# Patient Record
Sex: FEMALE | Race: WHITE | NOT HISPANIC OR LATINO | Employment: FULL TIME | URBAN - METROPOLITAN AREA
[De-identification: names, ages, dates, MRNs, and addresses within clinical notes are randomized per-mention and may not be internally consistent; named-entity substitution may affect disease eponyms.]

---

## 2019-04-24 ENCOUNTER — HOSPITAL ENCOUNTER (OUTPATIENT)
Dept: LAB | Facility: HOSPITAL | Age: 54
Discharge: HOME | End: 2019-04-24
Attending: INTERNAL MEDICINE

## 2019-04-24 LAB
T4 FREE SERPL-MCNC: 1.4 NG/DL (ref 0.9–1.8)
TSH SERPL-ACNC: 3.44 M[IU]/L (ref 0.27–4.2)

## 2020-08-05 ENCOUNTER — OFFICE VISIT CONVERTED (OUTPATIENT)
Dept: GASTROENTEROLOGY | Facility: CLINIC | Age: 55
End: 2020-08-05
Attending: NURSE PRACTITIONER

## 2020-09-03 ENCOUNTER — OFFICE VISIT CONVERTED (OUTPATIENT)
Dept: UROLOGY | Facility: CLINIC | Age: 55
End: 2020-09-03
Attending: NURSE PRACTITIONER

## 2020-09-03 ENCOUNTER — HOSPITAL ENCOUNTER (OUTPATIENT)
Dept: SURGERY | Facility: CLINIC | Age: 55
Discharge: HOME | End: 2020-09-03
Attending: NURSE PRACTITIONER

## 2020-09-03 ENCOUNTER — CONVERSION ENCOUNTER (OUTPATIENT)
Dept: SURGERY | Facility: CLINIC | Age: 55
End: 2020-09-03

## 2020-09-05 LAB — BACTERIA UR CULT: NORMAL

## 2020-10-12 ENCOUNTER — HOSPITAL ENCOUNTER (OUTPATIENT)
Dept: PREADMISSION TESTING | Facility: HOSPITAL | Age: 55
Discharge: HOME | End: 2020-10-12
Attending: INTERNAL MEDICINE

## 2020-10-13 LAB — SARS-COV-2 RNA SPEC QL NAA+PROBE: NOT DETECTED

## 2020-10-16 ENCOUNTER — HOSPITAL ENCOUNTER (OUTPATIENT)
Dept: GASTROENTEROLOGY | Facility: HOSPITAL | Age: 55
Setting detail: HOSPITAL OUTPATIENT SURGERY
Discharge: HOME | End: 2020-10-16
Attending: INTERNAL MEDICINE

## 2020-11-30 ENCOUNTER — HOSPITAL ENCOUNTER (OUTPATIENT)
Dept: NUCLEAR MEDICINE | Facility: HOSPITAL | Age: 55
Discharge: HOME | End: 2020-11-30
Attending: FAMILY MEDICINE

## 2021-02-19 ENCOUNTER — HOSPITAL ENCOUNTER (OUTPATIENT)
Dept: LAB | Facility: HOSPITAL | Age: 56
Discharge: HOME | End: 2021-02-19
Attending: FAMILY MEDICINE

## 2021-02-20 LAB
ALBUMIN SERPL-MCNC: 4.2 G/DL (ref 3.5–5)
ALBUMIN/GLOB SERPL: 1.7 {RATIO} (ref 1.4–2.6)
ALP SERPL-CCNC: 95 U/L (ref 53–141)
ALT SERPL-CCNC: 22 U/L (ref 10–40)
ANION GAP SERPL CALC-SCNC: 15 MMOL/L (ref 8–19)
AST SERPL-CCNC: 25 U/L (ref 15–50)
BILIRUB SERPL-MCNC: 0.23 MG/DL (ref 0.2–1.3)
BUN SERPL-MCNC: 20 MG/DL (ref 5–25)
BUN/CREAT SERPL: 19 {RATIO} (ref 6–20)
CALCIUM SERPL-MCNC: 8.7 MG/DL (ref 8.7–10.4)
CHLORIDE SERPL-SCNC: 102 MMOL/L (ref 99–111)
CHOLEST SERPL-MCNC: 221 MG/DL (ref 107–200)
CHOLEST/HDLC SERPL: 3.1 {RATIO} (ref 3–6)
CONV CO2: 23 MMOL/L (ref 22–32)
CONV TOTAL PROTEIN: 6.7 G/DL (ref 6.3–8.2)
CREAT UR-MCNC: 1.08 MG/DL (ref 0.5–0.9)
GFR SERPLBLD BASED ON 1.73 SQ M-ARVRAT: 57 ML/MIN/{1.73_M2}
GLOBULIN UR ELPH-MCNC: 2.5 G/DL (ref 2–3.5)
GLUCOSE SERPL-MCNC: 102 MG/DL (ref 65–99)
HDLC SERPL-MCNC: 71 MG/DL (ref 40–60)
LDLC SERPL CALC-MCNC: 118 MG/DL (ref 70–100)
OSMOLALITY SERPL CALC.SUM OF ELEC: 285 MOSM/KG (ref 273–304)
POTASSIUM SERPL-SCNC: 4.3 MMOL/L (ref 3.5–5.3)
SODIUM SERPL-SCNC: 136 MMOL/L (ref 135–147)
TRIGL SERPL-MCNC: 161 MG/DL (ref 40–150)
VLDLC SERPL-MCNC: 32 MG/DL (ref 5–37)

## 2021-05-10 NOTE — H&P
"   History and Physical      Patient Name: Gualberto Gloria   Patient ID: [id number]   Sex: Female   YOB: 1965    Primary Care Provider: Alfonzo Delacruz MD   Referring Provider: Alfonzo Delacruz MD    Visit Date: August 5, 2020    Provider: ANA LAURA Flowers   Location: [facility]   Location Address: [address] [place], [place] [address], [place]  [id number]   Location Phone: [phone]          Chief Complaint  · \"Abdominal pain\"  · Johnson's Esophagus      History Of Present Illness  The patient is a 55 year old /White female, who presents on referral from Alfonzo Delacruz MD, for a gastroenterology evaluation for abdominal pain.          The patient went to the ED at [facility] on 6/28/2020 for abdominal pain.  CT at that time revealed mild right-sided colitis.  She was diagnosed with a UTI and colitis at that visit and was given a prescription of Flagyl and Cipro.  She went back to the ER on 07/12/2020 for abdominal pain.  CT abdomen/pelvis at that ER visit was unremarkable.  She was positive for C. difficile at her second ED visit.  She was given a prescription of vancomycin.  She states that her abdominal pain has gotten a lot better now since taking the vancomycin, but she still has some abdominal pain.  Her abdominal pain is intermittent and is located anywhere between her umbilical and suprapubic region.  She describes the pain as \"rolling.\"  She states that she had blood in her stool that she has seen within the last couple weeks occurring intermittently with wiping.  She states that she feels it takes longer to get her bowel movement.  She has no family history of colon cancer.  Her uncle had stomach cancer.  The patient has a past medical history of Johnson's esophagus.  She states that her reflux is well controlled with omeprazole.    Labs 07/12/2020: C. difficile positive, hemoglobin 13.9, hematocrit 41.9, platelets 295, ALT 19, AST 16, total bili 0.4, alk " phos 83    CT abdomen/pelvis with contrast on 07/12/2020 revealed no acute process identified within the abdomen/pelvis.    CT abdomen/pelvis with contrast on 06/28/2020 revealed mild right-sided colitis.    EGD by Dr. Bueno on 03/2017 revealed small hiatal hernia, salmon-colored mucosa distributed in a patchy pattern in the esophagus found, normal stomach, and normal duodenum.  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    Colonoscopy on 03/2014 by Dr. Bueno revealed normal colon.  Random colon biopsy revealed no significant histopathology.       Past Medical History  Johnson's esophagus; High blood pressure; Hyperthyroidism         Past Surgical History  Colonoscopy; EGD; Tubal ligation         Medication List  Aspir-81 81 mg oral tablet,delayed release (DR/EC); levothyroxine 25 mcg oral tablet; lisinopril-hydrochlorothiazide 20-12.5 mg oral tablet; omeprazole 40 mg oral capsule,delayed release(DR/EC); paroxetine HCl 20 mg oral tablet; simvastatin 20 mg oral tablet         Allergy List  NO KNOWN DRUG ALLERGIES       Allergies Reconciled  Family Medical History  - No Family History of Colorectal Cancer; Family history of stomach cancer         Social History  Alcohol (Current some day); Tobacco (Current every day)         Review of Systems  · Constitutional  o Denies  o : chills, fever  · Eyes  o Denies  o : blurred vision, changes in vision  · Cardiovascular  o Denies  o : chest pain  · Respiratory  o Denies  o : shortness of breath  · Gastrointestinal  o Denies  o : nausea, vomiting  · Genitourinary  o Denies  o : dysuria, blood in urine  · Integument  o Denies  o : rash  · Neurologic  o Denies  o : tingling or numbness  · Musculoskeletal  o Denies  o : joint pain  · Endocrine  o Denies  o : weight gain, weight loss  · Psychiatric  o Denies  o : anxiety, depression      Vitals  Date Time BP  "Position Site L\R Cuff Size HR RR TEMP (F) WT  HT  BMI kg/m2 BSA m2 O2 Sat        08/05/2020 09:40 /75 Sitting    77 - R 16  203lbs 16oz 5'  4\" 35.02 2.04 99 %          Physical Examination  · Constitutional  o Appearance  o : obese, well developed  · Eyes  o Vision  o :   § Visual Fields  § : move symmetrical in all directions  o Sclerae  o : sclerae anicteric  o Pupils and Irises  o : pupils equal and symetrical  · Neck  o Inspection/Palpation  o : Trachea is midline, no adenopathy  o Thyroid  o : Thyroid is not enlarged  · Respiratory  o Respiratory Effort  o : Breathing is unlabored.  o Inspection of Chest  o : normal appearance, no retractions  o Auscultation of Lungs  o : Chest is clear to auscultation bilaterally  · Cardiovascular  o Heart  o :   § Auscultation of Heart  § : no murmurs, rubs, or gallops, RRR  · Gastrointestinal  o Abdominal Examination  o : Abdomen is soft, nontender to palpation, with normal active bowel sounds, no appreciable hepatosplenomegaly.  · Genitourinary  o Digital Rectal Examination  o : Deferred  · Skin and Subcutaneous Tissue  o General Inspection  o : Skin is without focal lesions. Skin turgor is normal.  · Psychiatric  o Mood and Affect  o : Mood and affect are appropriate to circumstances.              Assessment  · Abdominal pain, generalized     789.07/R10.84  · Altered Bowel Habits     787.99/R19.4  · Johnson's esophagus     530.85  · Rectal bleeding     569.3/K62.5  · GERD (gastroesophageal reflux disease)     530.81/K21.9      Plan  · Orders  o Consent for Esophagogastrodudodenoscopy (EGD) with dilatation -Possible risk/complications, benefits, and alternatives to surgical or invasive procedure have been explained to patient and/or legal guardian. -Patient has been evaluated and can tolerate anesthesia and/or sedation. Risk, benefits, and alternatives to anesthesia and sedation have been explained to patient and/or legal guardian. ([id number]) - 530.85, 530.81/K21.9 - " 08/05/2020  o Flexible Colonoscopy -Possible risks/complications, benefits, and alternatives to surgical or invasive procedure have been explained to patient and/or legal gaurdian. -Patient has been evaluated and can tolerate anethesia and/or sedation. Risk, benefits, and alternatives to anethesia and/or sedation have been explained to patient and/or legal gaurdian. ([id number]) - 569.3/K62.5, 789.07/R10.84, 787.99/R19.4 - 08/05/2020  · Instructions  o 55-year-old female, with a history of Johnson's esophagus, presents today after having 2 ED visits at [facility] for abdominal pain. On her second ER visit, she was positive for C. difficile. She states that her abdominal pain has gotten better since taking vancomycin, but she still has occasional pain. She reports she has seen blood in her stool intermittently within the past few weeks with wiping. I have recommended staying on omeprazole as it is controlling her reflux and she has a history of Johnson's esophagus. I have recommended undergoing an EGD/colonoscopy for her Johnson's esophagus surveillance and for her lower GI symptoms. I have instructed to the patient that COVID testing will be required prior to procedure. Patient is agreeable to plan.   o Electronically Identified Patient Education Materials Provided Electronically            Electronically Signed by: ANA LAURA Flowers -Author on August 5, 2020 10:04:37 AM  Electronically Co-signed by: Lance Bueno MD -Reviewer on August 5, 2020 10:37:13 AM

## 2021-05-10 NOTE — H&P
"   History and Physical      Patient Name: Gualberto Gloria   Patient ID: [id number]   Sex: Female   YOB: 1965    Primary Care Provider: Alfonzo Dealcruz MD   Referring Provider: Alfonzo Delacruz MD    Visit Date: September 3, 2020    Provider: ANA LAURA Smith   Location: [facility]   Location Address: [address] [place] [address]  [id number]   Location Phone: [phone]          Chief Complaint  · pt here for urologic concerns      History Of Present Illness  The patient is a 55 year old /White female, who is a consultation from Alfonzo Delacruz MD, for a persistent UTI.          The patient states that this began June 28th 2020 when she began experiencing severe right lower quadrant abdominal pain and presented to the emergency department for that complaint.    The patient reports that she gave a urine specimen while in the emergency department and was diagnosed with a urinary tract infection.  She was treated with ciprofloxacin 500 mg twice daily x7 days and states that she had diarrhea the entire time that she was on this medication.  Patient was also found to have a possible small right sided urethral diverticulum on CT scan at this visit.    The patient presented again to the emergency department on July 12, 2024 abdominal complaints with diarrhea.  The patient reports that she was diagnosed with a urinary tract infection at that point in time as she was placed on antibiotic although, a review of the patient's records does not support this.  The patient was actually diagnosed with C. difficile and treated with vancomycin oral tablets, Zofran and, Bentyl.     She states that Dr. Delacruz referred her to urology as he believed she has a prolapsed bladder.    The patient reports having urgency frequency and bladder fullness.  She reports this as a \"achy hollow feeling\" as well as feeling bloated at the same time.    Urine " cultures:    6/28/2020: Multiple gram-positive organisms not suggestive of urinary tract  7/12/2020:  no uropathogens  8/12/2020: To bacilli 50,000 colony-forming units per mL             Past Medical History  Johnson's esophagus; High blood pressure; Hyperthyroidism         Past Surgical History  Colonoscopy; EGD; Tubal ligation         Medication List  Aspir-81 81 mg oral tablet,delayed release (DR/EC); levothyroxine 25 mcg oral tablet; lisinopril-hydrochlorothiazide 20-12.5 mg oral tablet; omeprazole 40 mg oral capsule,delayed release(DR/EC); paroxetine HCl 20 mg oral tablet; simvastatin 20 mg oral tablet; Suprep Bowel Prep Kit 17.5-3.13-1.6 gram oral recon soln; Vitamin D3 oral         Allergy List  Codeine Phosphate       Allergies Reconciled  Family Medical History  - No Family History of Colorectal Cancer; Family history of stomach cancer         Social History  Alcohol (Current some day); Tobacco (Former)         Review of Systems  · Constitutional  o Denies  o : fever, chills  · Cardiovascular  o Denies  o : chest pain at rest, chest pain with exercise, irregular heart beats, palpitations, leg cramps with exercise  · Respiratory  o Denies  o : shortness of breath, wheezing, sleep apnea  · Gastrointestinal  o Denies  o : heartburn or indigestion, nausea or vomiting, change in abdominal girth, diarrhea, constipation, blood in stools  · Genitourinary  o Admits  o : additional symptoms as noted in HPI  · Integument  o Denies  o : rash, new skin lesions  · Neurologic  o Denies  o : memory difficulties, headache, mini-strokes, seizures  · Endocrine  o Denies  o : hot flashes, thyroid disorders  · Heme-Lymph  o Denies  o : easy bleeding, easy bruising, sickle cell disease or trait, lymph node enlargement or tenderness  · Allergic-Immunologic  o Denies  o : immune deficiency, HIV, Hepatitis C      Vitals  Date Time BP Position Site L\R Cuff Size HR RR TEMP (F) WT  HT  BMI kg/m2 BSA m2 O2 Sat HC       09/03/2020  "10:24 AM       12  203lbs 16oz 5'  4\" 35.02 2.04           Physical Examination  · Constitutional  o Appearance  o : Well nourished, well developed patient in no acute distress. Ambulating without difficulty.  · Head and Face  o Head  o :   § Inspection  § : atraumatic, normocephalic  o Face  o :   § Inspection  § : no facial lesions  · Eyes  o Sclerae  o : sclerae white  · Ears, Nose, Mouth and Throat  o Ears  o :   § External Ears  § : appearance within normal limits, no lesions present  o Nose  o :   § External Nose  § : appearance normal  · Neck  o Inspection/Palpation  o : normal appearance, trachea midline  o Thyroid  o : Normal size without tenderness, nodules or masses  · Respiratory  o Respiratory Effort  o : Breathing is unlabored without accessory muscle use  o Inspection of Chest  o : normal appearance, no retractions  · Genitourinary  o External Genitalia  o : no inflammation, no lesions present  o Vagina  o : atrophic changes present, white-colored discharge present, no inflammatory lesions present, no masses present, urethral mobility 0 degrees  o Anus  o : no inflammation or lesions present  o Perineum  o : perineum within normal limits  · Musculoskeletal  o Pelvis  o : no pelvic bony or muscular tenderness  o Ribs  o : no deformities or tenderness to palpation present, costochondral junctions nontender to palpation  · Skin and Subcutaneous Tissue  o General Inspection  o : No rashes, lesions or areas of discoloration present. Skin turgor is normal.  · Neurologic  o Mental Status Examination  o :   § Orientation  § : grossly oriented to person, place and time  § Speech/Language  § : communication ability within normal limits  o Gait and Station  o : normal gait, able to stand without difficulty  · Psychiatric  o Judgement and Insight  o : judgment and insight intact, judgement for everyday activities and social situations within normal limits, insight intact  o Mood and Affect  o : mood normal, " affect appropriate          Results  · In-Office Procedures  o Lab procedure  § Automated dipstick urinalysis with microscopy ([id number])   § Color Ur: Yellow   § Clarity Ur: Clear   § Glucose Ur Ql Strip: Negative   § Bilirub Ur Ql Strip: Negative   § Ketones Ur Ql Strip: Negative   § Sp Gr Ur Qn: 1.020   § Hgb Ur Ql Strip: Negative   § pH Ur-LsCnc: 5.0   § Prot Ur Ql Strip: Negative   § Urobilinogen Ur Strip-mCnc: 0.2   § Nitrite Ur Ql Strip: Negative   § WBC Est Ur Ql Strip: Negative   § RBC UrnS Qn HPF: 0   § WBC UrnS Qn HPF: 0   § Bacteria UrnS Qn HPF: tntc   § Crystals UrnS Qn HPF: 0   § Epithelial Cells (non renal): 0 /HPF  § Epithelial Cells (renal): 0   o Surgical procedure  § IOP - Bladder Scan/Residual Urine ([id number])   § Specimen vol Ur: 44       Assessment  · Urinary frequency     788.41/R35.0  · Urinary urgency     788.63/R39.15  · Urethral diverticulum     599.2/N36.1    Problems Reconciled  Plan  · Orders  o Urine Culture (Clean Catch) [facility] (23240) - 788.41/R35.0 - 09/03/2020  · Medications  o oxybutynin chloride 5 mg oral tablet extended release 24hr   SIG: take 1 tablet (5 mg) by oral route once daily for 30 days   DISP: (30) tablets with 11 refills  Prescribed on 09/03/2020     o Medications have been Reconciled  o Transition of Care or Provider Policy  · Instructions  o DISCUSSION: discussed that she does not appear to be having urinary tract infections and that he does not appear to have a prolapse of her bladder. Discussed with the patient that her symptoms sound consistent with overactive bladder and that we can order medication to treat those symptoms. Educated the patient that this medication may cause dry mouth and dry eyes and can cause urinary retention as well. Patient is agreeable to trial this medication will call the office if she is having any issues.  o PLAN: Initiate oxybutynin 5 mg daily, patient educated to avoid bladder irritants, we will follow-up with patient in office in 6  weeks.  o Urine culture  o Electronically Identified Patient Education Materials Provided Electronically            Electronically Signed by: ANA LAURA Smith -Author on September 3, 2020 01:15:02 PM

## 2021-05-14 VITALS — RESPIRATION RATE: 12 BRPM | BODY MASS INDEX: 34.83 KG/M2 | HEIGHT: 64 IN | WEIGHT: 204 LBS

## 2021-05-15 VITALS
RESPIRATION RATE: 16 BRPM | HEART RATE: 77 BPM | BODY MASS INDEX: 34.83 KG/M2 | WEIGHT: 204 LBS | SYSTOLIC BLOOD PRESSURE: 107 MMHG | OXYGEN SATURATION: 99 % | HEIGHT: 64 IN | DIASTOLIC BLOOD PRESSURE: 75 MMHG

## 2021-12-02 ENCOUNTER — OFFICE VISIT (OUTPATIENT)
Dept: INTERNAL MEDICINE | Facility: CLINIC | Age: 56
End: 2021-12-02

## 2021-12-02 VITALS
HEART RATE: 74 BPM | TEMPERATURE: 98.1 F | DIASTOLIC BLOOD PRESSURE: 84 MMHG | WEIGHT: 206.6 LBS | BODY MASS INDEX: 35.27 KG/M2 | SYSTOLIC BLOOD PRESSURE: 118 MMHG | OXYGEN SATURATION: 94 % | HEIGHT: 64 IN

## 2021-12-02 DIAGNOSIS — B00.9 HERPES SIMPLEX: ICD-10-CM

## 2021-12-02 DIAGNOSIS — F17.211 CIGARETTE NICOTINE DEPENDENCE IN REMISSION: ICD-10-CM

## 2021-12-02 DIAGNOSIS — J44.1 COPD WITH EXACERBATION (HCC): ICD-10-CM

## 2021-12-02 DIAGNOSIS — Z76.89 ESTABLISHING CARE WITH NEW DOCTOR, ENCOUNTER FOR: Primary | ICD-10-CM

## 2021-12-02 DIAGNOSIS — F32.89 OTHER DEPRESSION: ICD-10-CM

## 2021-12-02 DIAGNOSIS — I10 ESSENTIAL HYPERTENSION: ICD-10-CM

## 2021-12-02 DIAGNOSIS — R07.89 OTHER CHEST PAIN: ICD-10-CM

## 2021-12-02 DIAGNOSIS — E03.8 OTHER SPECIFIED HYPOTHYROIDISM: ICD-10-CM

## 2021-12-02 DIAGNOSIS — Z13.0 SCREENING FOR DEFICIENCY ANEMIA: ICD-10-CM

## 2021-12-02 DIAGNOSIS — Z78.0 POSTMENOPAUSAL: ICD-10-CM

## 2021-12-02 DIAGNOSIS — E78.2 MIXED HYPERLIPIDEMIA: ICD-10-CM

## 2021-12-02 DIAGNOSIS — Z12.31 ENCOUNTER FOR SCREENING MAMMOGRAM FOR MALIGNANT NEOPLASM OF BREAST: ICD-10-CM

## 2021-12-02 DIAGNOSIS — N18.31 STAGE 3A CHRONIC KIDNEY DISEASE (HCC): ICD-10-CM

## 2021-12-02 DIAGNOSIS — R73.9 BLOOD GLUCOSE ELEVATED: ICD-10-CM

## 2021-12-02 LAB
EXPIRATION DATE: NORMAL
FLUAV AG UPPER RESP QL IA.RAPID: NOT DETECTED
FLUBV AG UPPER RESP QL IA.RAPID: NOT DETECTED
INTERNAL CONTROL: NORMAL
Lab: NORMAL
SARS-COV-2 AG UPPER RESP QL IA.RAPID: NOT DETECTED

## 2021-12-02 PROCEDURE — 84443 ASSAY THYROID STIM HORMONE: CPT | Performed by: STUDENT IN AN ORGANIZED HEALTH CARE EDUCATION/TRAINING PROGRAM

## 2021-12-02 PROCEDURE — 87428 SARSCOV & INF VIR A&B AG IA: CPT | Performed by: STUDENT IN AN ORGANIZED HEALTH CARE EDUCATION/TRAINING PROGRAM

## 2021-12-02 PROCEDURE — 80061 LIPID PANEL: CPT | Performed by: STUDENT IN AN ORGANIZED HEALTH CARE EDUCATION/TRAINING PROGRAM

## 2021-12-02 PROCEDURE — 99204 OFFICE O/P NEW MOD 45 MIN: CPT | Performed by: STUDENT IN AN ORGANIZED HEALTH CARE EDUCATION/TRAINING PROGRAM

## 2021-12-02 PROCEDURE — 85025 COMPLETE CBC W/AUTO DIFF WBC: CPT | Performed by: STUDENT IN AN ORGANIZED HEALTH CARE EDUCATION/TRAINING PROGRAM

## 2021-12-02 PROCEDURE — 83036 HEMOGLOBIN GLYCOSYLATED A1C: CPT | Performed by: STUDENT IN AN ORGANIZED HEALTH CARE EDUCATION/TRAINING PROGRAM

## 2021-12-02 PROCEDURE — 80053 COMPREHEN METABOLIC PANEL: CPT | Performed by: STUDENT IN AN ORGANIZED HEALTH CARE EDUCATION/TRAINING PROGRAM

## 2021-12-02 PROCEDURE — 84439 ASSAY OF FREE THYROXINE: CPT | Performed by: STUDENT IN AN ORGANIZED HEALTH CARE EDUCATION/TRAINING PROGRAM

## 2021-12-02 PROCEDURE — 87635 SARS-COV-2 COVID-19 AMP PRB: CPT | Performed by: STUDENT IN AN ORGANIZED HEALTH CARE EDUCATION/TRAINING PROGRAM

## 2021-12-02 RX ORDER — ASPIRIN 81 MG/1
81 TABLET ORAL EVERY OTHER DAY
COMMUNITY
End: 2021-12-02 | Stop reason: SDUPTHER

## 2021-12-02 RX ORDER — SIMVASTATIN 20 MG
20 TABLET ORAL DAILY
Qty: 90 TABLET | Refills: 3 | Status: SHIPPED | OUTPATIENT
Start: 2021-12-02 | End: 2021-12-03

## 2021-12-02 RX ORDER — ACYCLOVIR 400 MG/1
400 TABLET ORAL 3 TIMES DAILY
Qty: 21 TABLET | Refills: 0 | Status: SHIPPED | OUTPATIENT
Start: 2021-12-02 | End: 2021-12-09

## 2021-12-02 RX ORDER — LEVOTHYROXINE SODIUM 0.03 MG/1
25 TABLET ORAL DAILY
Qty: 90 TABLET | Refills: 2 | Status: SHIPPED | OUTPATIENT
Start: 2021-12-02 | End: 2022-11-16 | Stop reason: SDUPTHER

## 2021-12-02 RX ORDER — LISINOPRIL AND HYDROCHLOROTHIAZIDE 20; 12.5 MG/1; MG/1
1 TABLET ORAL DAILY
COMMUNITY
End: 2021-12-02 | Stop reason: SDUPTHER

## 2021-12-02 RX ORDER — CHOLECALCIFEROL (VITAMIN D3) 125 MCG
CAPSULE ORAL EVERY OTHER DAY
COMMUNITY
End: 2021-12-02 | Stop reason: SDUPTHER

## 2021-12-02 RX ORDER — LEVOTHYROXINE SODIUM 0.03 MG/1
25 TABLET ORAL DAILY
COMMUNITY
End: 2021-12-02 | Stop reason: SDUPTHER

## 2021-12-02 RX ORDER — PREDNISONE 20 MG/1
40 TABLET ORAL DAILY
Qty: 10 TABLET | Refills: 0 | Status: SHIPPED | OUTPATIENT
Start: 2021-12-02 | End: 2021-12-07

## 2021-12-02 RX ORDER — PAROXETINE HYDROCHLORIDE 40 MG/1
40 TABLET, FILM COATED ORAL EVERY MORNING
Qty: 90 TABLET | Refills: 3 | Status: SHIPPED | OUTPATIENT
Start: 2021-12-02 | End: 2022-11-16 | Stop reason: SDUPTHER

## 2021-12-02 RX ORDER — PAROXETINE HYDROCHLORIDE 40 MG/1
40 TABLET, FILM COATED ORAL EVERY MORNING
COMMUNITY
End: 2021-12-02 | Stop reason: SDUPTHER

## 2021-12-02 RX ORDER — OMEPRAZOLE 40 MG/1
40 CAPSULE, DELAYED RELEASE ORAL DAILY
COMMUNITY
End: 2021-12-02 | Stop reason: SDUPTHER

## 2021-12-02 RX ORDER — SIMVASTATIN 20 MG
20 TABLET ORAL DAILY
COMMUNITY
End: 2021-12-02 | Stop reason: SDUPTHER

## 2021-12-02 RX ORDER — OMEPRAZOLE 40 MG/1
40 CAPSULE, DELAYED RELEASE ORAL DAILY
Qty: 90 CAPSULE | Refills: 3 | Status: SHIPPED | OUTPATIENT
Start: 2021-12-02 | End: 2022-11-16 | Stop reason: SDUPTHER

## 2021-12-02 RX ORDER — LISINOPRIL AND HYDROCHLOROTHIAZIDE 20; 12.5 MG/1; MG/1
1 TABLET ORAL DAILY
Qty: 90 TABLET | Refills: 3 | Status: SHIPPED | OUTPATIENT
Start: 2021-12-02 | End: 2022-11-16 | Stop reason: SDUPTHER

## 2021-12-02 RX ORDER — CHOLECALCIFEROL (VITAMIN D3) 125 MCG
2000 CAPSULE ORAL EVERY OTHER DAY
Qty: 30 TABLET | Refills: 2 | Status: SHIPPED | OUTPATIENT
Start: 2021-12-02

## 2021-12-02 RX ORDER — ASPIRIN 81 MG/1
81 TABLET ORAL EVERY OTHER DAY
Qty: 90 TABLET | Refills: 2 | Status: SHIPPED | OUTPATIENT
Start: 2021-12-02

## 2021-12-02 NOTE — PROGRESS NOTES
Chief Complaint  Establish Care, Med Refill, and Sinus Problem (pissible sinus infection)    Subjective          Gualberto Gloria presents to [facility]  History of Present Illness    Previous PCP: Dr Delacruz  Specialist(s):  none  COVID vaccine: none  Colon cancer screeninyrs ago  Mammogram: none  Pap Smear: 12 years  DEXA/Bone Density: none    Here with  establish care.  Previous PCP was Dr. Delacruz    Past medical history is notable for essential hypertension, hypothyroid, and reportedly COPD.    Here with 3 days of cough productive of yellow sputum.  Also notes that recently started having sores appear on her lips.  These occur about once a year.    She is afebrile.  She quit smoking approximately 4 years ago.  She is on no inhalers.  She does report sinus pressure.    Works as     History of depression, on paroxetine.  She denies SI.    Reports having left sided chest pain  No triggering factors or exacerbating factors  Sitting still helps alleviate  Reports having previous evaluation by cardiology (Dr. Pastrana) for chest pain including a negative stress test    No previous known history of MI, CVA or cancer diagnosis.    PHQ-9 Depression Screening  Little interest or pleasure in doing things? 0   Feeling down, depressed, or hopeless? 0   Trouble falling or staying asleep, or sleeping too much?     Feeling tired or having little energy?     Poor appetite or overeating?     Feeling bad about yourself - or that you are a failure or have let yourself or your family down?     Trouble concentrating on things, such as reading the newspaper or watching television?     Moving or speaking so slowly that other people could have noticed? Or the opposite - being so fidgety or restless that you have been moving around a lot more than usual?     Thoughts that you would be better off dead, or of hurting yourself in some way?     PHQ-9 Total Score 0  "  If you checked off any problems, how difficult have these problems made it for you to do your work, take care of things at home, or get along with other people?             Current Outpatient Medications   Medication Instructions   • acyclovir (ZOVIRAX) 400 mg, Oral, 3 Times Daily, Take 3 times a day for 5 days.  Then twice a day   • aspirin 81 mg, Oral, Every Other Day   • levothyroxine (SYNTHROID, LEVOTHROID) 25 mcg, Oral, Daily   • lisinopril-hydrochlorothiazide (PRINZIDE,ZESTORETIC) 20-12.5 MG per tablet 1 tablet, Oral, Daily   • omeprazole (PRILOSEC) 40 mg, Oral, Daily   • PARoxetine (PAXIL) 40 mg, Oral, Every Morning   • predniSONE (DELTASONE) 40 mg, Oral, Daily   • simvastatin (ZOCOR) 20 mg, Oral, Daily   • Vitamin D3 50 mcg, Oral, Every Other Day         Objective   Vital Signs:   /84   Pulse 74   Temp 98.1 °F (36.7 °C) (Temporal)   Ht 162.6 cm (64\")   Wt 93.7 kg (206 lb 9.6 oz)   SpO2 94%   BMI 35.46 kg/m²     Wt Readings from Last 3 Encounters:   12/02/21 93.7 kg (206 lb 9.6 oz)   09/03/20 92.5 kg (204 lb)   08/05/20 92.5 kg (204 lb)     BP Readings from Last 3 Encounters:   12/02/21 118/84   08/05/20 107/75     Physical Exam  Constitutional:       Appearance: Normal appearance. She is obese.   HENT:      Head: Normocephalic and atraumatic.      Mouth/Throat:      Mouth: Mucous membranes are moist.      Pharynx: Oropharynx is clear. No oropharyngeal exudate or posterior oropharyngeal erythema.   Eyes:      Conjunctiva/sclera: Conjunctivae normal.   Cardiovascular:      Rate and Rhythm: Normal rate and regular rhythm.      Pulses: Normal pulses.      Heart sounds: Normal heart sounds. No murmur heard.      Pulmonary:      Effort: Pulmonary effort is normal. No respiratory distress.      Breath sounds: Normal breath sounds. No wheezing, rhonchi or rales.   Abdominal:      General: Abdomen is flat.      Palpations: Abdomen is soft.   Skin:     General: Skin is warm and dry.      Comments: " Superficial ulcers noted on lips of face.  No ulcers or sores noted in oropharynx   Neurological:      General: No focal deficit present.      Mental Status: She is alert. Mental status is at baseline.   Psychiatric:         Mood and Affect: Mood normal.         Behavior: Behavior normal.         Thought Content: Thought content normal.         Judgment: Judgment normal.        Result Review :   The following data was reviewed by: Chito Hernandes MD on 12/02/2021:  Common labs    Common Labsle 2/19/21 2/19/21    1356 1356   Glucose 102 (A)    BUN 20    Creatinine 1.08 (A)    Sodium 136    Potassium 4.3    Chloride 102    Calcium 8.7    Albumin 4.2    Total Bilirubin 0.23    Alkaline Phosphatase 95    AST (SGOT) 25    ALT (SGPT) 22    Total Cholesterol  221 (A)   Triglycerides  161 (A)   HDL Cholesterol  71 (A)   LDL Cholesterol   118 (A)   (A) Abnormal value       Comments are available for some flowsheets but are not being displayed.                Lab Results   Component Value Date    SARSANTIGEN Not Detected 12/02/2021    COVID19 NOT DETECTED 10/12/2020    FLUAAG Not Detected 12/02/2021         Procedures        Assessment and Plan    Diagnoses and all orders for this visit:    1. Establishing care with new doctor, encounter for (Primary)    2. Mixed hyperlipidemia  -     simvastatin (ZOCOR) 20 MG tablet; Take 1 tablet by mouth Daily.  Dispense: 90 tablet; Refill: 3  -     Lipid Panel  -     TSH    3. Stage 3a chronic kidney disease (HCC)  -     Comprehensive Metabolic Panel    4. Blood glucose elevated  -     Hemoglobin A1c    5. Encounter for screening mammogram for malignant neoplasm of breast  -     Mammo Screening Digital Tomosynthesis Bilateral With CAD; Future    6. Essential hypertension  -     lisinopril-hydrochlorothiazide (PRINZIDE,ZESTORETIC) 20-12.5 MG per tablet; Take 1 tablet by mouth Daily.  Dispense: 90 tablet; Refill: 3    7. Other specified hypothyroidism  -     levothyroxine (SYNTHROID,  LEVOTHROID) 25 MCG tablet; Take 1 tablet by mouth Daily.  Dispense: 90 tablet; Refill: 2  -     TSH  -     T4, Free    8. Other chest pain  -     POCT SARS-CoV-2 Antigen JAVED  -     COVID-19,CEPHEID/TIEN/BDMAX,COR/GONSALO/PAD/PHIL IN-HOUSE(OR EMERGENT/ADD-ON),NP SWAB IN TRANSPORT MEDIA 3-4 HR TAT, RT-PCR - Swab, Nasopharynx    9. Screening for deficiency anemia  -     CBC & Differential    10. COPD with exacerbation (HCC)  -     predniSONE (DELTASONE) 20 MG tablet; Take 2 tablets by mouth Daily for 5 days.  Dispense: 10 tablet; Refill: 0    11. Herpes simplex  -     acyclovir (Zovirax) 400 MG tablet; Take 1 tablet by mouth 3 (Three) Times a Day for 7 days. Take 3 times a day for 5 days.  Then twice a day  Dispense: 21 tablet; Refill: 0    12. Other depression  -     PARoxetine (PAXIL) 40 MG tablet; Take 1 tablet by mouth Every Morning.  Dispense: 90 tablet; Refill: 3    13. Cigarette nicotine dependence in remission  -      CT Chest Low Dose Cancer Screening WO; Future    14. Postmenopausal  -     DEXA Bone Density Axial    Other orders  -     aspirin 81 MG EC tablet; Take 1 tablet by mouth Every Other Day.  Dispense: 90 tablet; Refill: 2  -     Cholecalciferol (Vitamin D3) 50 MCG (2000 UT) tablet; Take 1 tablet by mouth Every Other Day.  Dispense: 30 tablet; Refill: 2  -     omeprazole (priLOSEC) 40 MG capsule; Take 1 capsule by mouth Daily.  Dispense: 90 capsule; Refill: 3    CP:  -reportedly with negative stress test  -have asked  to request cardiology records    Obesity:  -recommended 2 1/2 hrs a week of moderate exercise  -nutritional counseling today including low sodium diet (<2,500 mg Na daily) as well as Mediterranean diet    Health maintenance:  -declines flu shot today    Medications Discontinued During This Encounter   Medication Reason   • aspirin 81 MG EC tablet Reorder   • Cholecalciferol (Vitamin D3) 50 MCG (2000 UT) tablet Reorder   • levothyroxine (SYNTHROID, LEVOTHROID) 25 MCG tablet Reorder    • omeprazole (priLOSEC) 40 MG capsule Reorder   • simvastatin (ZOCOR) 20 MG tablet Reorder   • lisinopril-hydrochlorothiazide (PRINZIDE,ZESTORETIC) 20-12.5 MG per tablet Reorder   • PARoxetine (PAXIL) 40 MG tablet Reorder          Follow Up   Return in about 3 months (around 3/2/2022) for Hypothyroid.  Patient was given instructions and counseling regarding her condition or for health maintenance advice. Please see specific information pulled into the AVS if appropriate.

## 2021-12-02 NOTE — PATIENT INSTRUCTIONS
Cooking With Less Salt  Cooking with less salt is one way to reduce the amount of sodium you get from food. Sodium is one of the elements that make up salt. It is found naturally in foods and is also added to certain foods. Depending on your condition and overall health, your health care provider or dietitian may recommend that you reduce your sodium intake. Most people should have less than 2,300 milligrams (mg) of sodium each day. If you have high blood pressure (hypertension), you may need to limit your sodium to 1,500 mg each day. Follow the tips below to help reduce your sodium intake.  What are tips for eating less sodium?  Reading food labels       · Check the food label before buying or using packaged ingredients. Always check the label for the serving size and sodium content.  · Look for products with no more than 140 mg of sodium in one serving.  · Check the % Daily Value column to see what percent of the daily recommended amount of sodium is provided in one serving of the product. Foods with 5% or less in this column are considered low in sodium. Foods with 20% or higher are considered high in sodium.  · Do not choose foods with salt as one of the first three ingredients on the ingredients list. If salt is one of the first three ingredients, it usually means the item is high in sodium.     Shopping  1. Buy sodium-free or low-sodium products. Look for the following words on food labels:  ? Low-sodium.  ? Sodium-free.  ? Reduced-sodium.  ? No salt added.  ? Unsalted.  2. Always check the sodium content even if foods are labeled as low-sodium or no salt added.  3. Buy fresh foods.  Cooking  · Use herbs, seasonings without salt, and spices as substitutes for salt.  · Use sodium-free baking soda when baking.  · Zenda, braise, or roast foods to add flavor with less salt.  · Avoid adding salt to pasta, rice, or hot cereals.  · Drain and rinse canned vegetables, beans, and meat before use.  · Avoid adding salt  "when cooking sweets and desserts.  · Cook with low-sodium ingredients.  What foods are high in sodium?  Vegetables  Regular canned vegetables (not low-sodium or reduced-sodium). Sauerkraut, pickled vegetables, and relishes. Olives. French fries. Onion rings. Regular canned tomato sauce and paste. Regular tomato and vegetable juice. Frozen vegetables in sauces.  Grains  Instant hot cereals. Bread stuffing, pancake, and biscuit mixes. Croutons. Seasoned rice or pasta mixes. Noodle soup cups. Boxed or frozen macaroni and cheese. Regular salted crackers. Self-rising flour. Rolls. Bagels. Flour tortillas and wraps.  Meats and other proteins  Meat or fish that is salted, canned, smoked, cured, spiced, or pickled. This includes salas, ham, sausages, hot dogs, corned beef, chipped beef, meat loaves, salt pork, jerky, pickled herring, anchovies, regular canned tuna, and sardines. Salted nuts.  Dairy  Processed cheese and cheese spreads. Cheese curds. Blue cheese. Feta cheese. String cheese. Regular cottage cheese. Buttermilk. Canned milk.  The items listed above may not be a complete list of foods high in sodium. Actual amounts of sodium may be different depending on processing. Contact a dietitian for more information.  What foods are low in sodium?  Fruits  Fresh, frozen, or canned fruit with no sauce added. Fruit juice.  Vegetables  Fresh or frozen vegetables with no sauce added. \"No salt added\" canned vegetables. \"No salt added\" tomato sauce and paste. Low-sodium or reduced-sodium tomato and vegetable juice.  Grains  Noodles, pasta, quinoa, rice. Shredded or puffed wheat or puffed rice. Regular or quick oats (not instant). Low-sodium crackers. Low-sodium bread. Whole-grain bread and whole-grain pasta. Unsalted popcorn.  Meats and other proteins  Fresh or frozen whole meats, poultry (not injected with sodium), and fish with no sauce added. Unsalted nuts. Dried peas, beans, and lentils without added salt. Unsalted canned " beans. Eggs. Unsalted nut butters. Low-sodium canned tuna or chicken.  Dairy  Milk. Soy milk. Yogurt. Low-sodium cheeses, such as Swiss, [place], mozzarella, and ricotta. Sherbet or ice cream (keep to ½ cup per serving). Cream cheese.  Fats and oils  Unsalted butter or margarine.  Other foods  Homemade pudding. Sodium-free baking soda and baking powder. Herbs and spices. Low-sodium seasoning mixes.  Beverages  Coffee and tea. Carbonated beverages.  The items listed above may not be a complete list of foods low in sodium. Actual amounts of sodium may be different depending on processing. Contact a dietitian for more information.  What are some salt alternatives when cooking?  The following are herbs, seasonings, and spices that can be used instead of salt to flavor your food. Herbs should be fresh or dried. Do not choose packaged mixes. Next to the name of the herb, spice, or seasoning are some examples of foods you can pair it with.  Herbs  · Bay leaves - Soups, meat and vegetable dishes, and spaghetti sauce.  · Basil - Italian dishes, soups, pasta, and fish dishes.  · Cilantro - Meat, poultry, and vegetable dishes.  · Chili powder - Marinades and Mexican dishes.  · Chives - Salad dressings and potato dishes.  · Cumin - Mexican dishes, couscous, and meat dishes.  · Dill - Fish dishes, sauces, and salads.  · Fennel - Meat and vegetable dishes, breads, and cookies.  · Garlic (do not use garlic salt) - Italian dishes, meat dishes, salad dressings, and sauces.  · Marjoram - Soups, potato dishes, and meat dishes.  · Oregano - Pizza and spaghetti sauce.  · Parsley - Salads, soups, pasta, and meat dishes.  · Beata - Italian dishes, salad dressings, soups, and red meats.  · Saffron - Fish dishes, pasta, and some poultry dishes.  · Isreal - Stuffings and sauces.  · Tarragon - Fish and poultry dishes.  · Thyme - Stuffing, meat, and fish dishes.  Seasonings  · Lemon juice - Fish dishes, poultry dishes, vegetables, and  "salads.  · Vinegar - Salad dressings, vegetables, and fish dishes.  Spices  · Cinnamon - Sweet dishes, such as cakes, cookies, and puddings.  · Cloves - Gingerbread, puddings, and marinades for meats.  · Byers - Vegetable dishes, fish and poultry dishes, and stir-young dishes.  · Saloni - Vegetable dishes, fish dishes, and stir-young dishes.  · Nutmeg - Pasta, vegetables, poultry, fish dishes, and custard.  Summary  · Cooking with less salt is one way to reduce the amount of sodium that you get from food.  · Buy sodium-free or low-sodium products.  · Check the food label before using or buying packaged ingredients.  · Use herbs, seasonings without salt, and spices as substitutes for salt in foods.  This information is not intended to replace advice given to you by your health care provider. Make sure you discuss any questions you have with your health care provider.  Document Revised: 12/09/2020 Document Reviewed: 12/09/2020  [employer] Patient Education © 2021 [employer] Inc[employer]      https://www.nhlbi.nih.gov/files/docs/public/heart/dash_brief.pdf\">   DASH Eating Plan  DASH stands for Dietary Approaches to Stop Hypertension. The DASH eating plan is a healthy eating plan that has been shown to:  · Reduce high blood pressure (hypertension).  · Reduce your risk for type 2 diabetes, heart disease, and stroke.  · Help with weight loss.  What are tips for following this plan?  Reading food labels  · Check food labels for the amount of salt (sodium) per serving. Choose foods with less than 5 percent of the Daily Value of sodium. Generally, foods with less than 300 milligrams (mg) of sodium per serving fit into this eating plan.  · To find whole grains, look for the word \"whole\" as the first word in the ingredient list.  Shopping  · Buy products labeled as \"low-sodium\" or \"no salt added.\"  · Buy fresh foods. Avoid canned foods and pre-made or frozen meals.  Cooking  · Avoid adding salt when cooking. Use salt-free seasonings or herbs " instead of table salt or sea salt. Check with your health care provider or pharmacist before using salt substitutes.  · Do not young foods. Cook foods using healthy methods such as baking, boiling, grilling, roasting, and broiling instead.  · Cook with heart-healthy oils, such as olive, canola, avocado, soybean, or sunflower oil.  Meal planning       1. Eat a balanced diet that includes:  ? 4 or more servings of fruits and 4 or more servings of vegetables each day. Try to fill one-half of your plate with fruits and vegetables.  ? 6-8 servings of whole grains each day.  ? Less than 6 oz (170 g) of lean meat, poultry, or fish each day. A 3-oz (85-g) serving of meat is about the same size as a deck of cards. One egg equals 1 oz (28 g).  ? 2-3 servings of low-fat dairy each day. One serving is 1 cup (237 mL).  ? 1 serving of nuts, seeds, or beans 5 times each week.  ? 2-3 servings of heart-healthy fats. Healthy fats called omega-3 fatty acids are found in foods such as walnuts, flaxseeds, fortified milks, and eggs. These fats are also found in cold-water fish, such as sardines, salmon, and mackerel.  2. Limit how much you eat of:  ? Canned or prepackaged foods.  ? Food that is high in trans fat, such as some fried foods.  ? Food that is high in saturated fat, such as fatty meat.  ? Desserts and other sweets, sugary drinks, and other foods with added sugar.  ? Full-fat dairy products.  3. Do not salt foods before eating.  4. Do not eat more than 4 egg yolks a week.  5. Try to eat at least 2 vegetarian meals a week.  6. Eat more home-cooked food and less restaurant, buffet, and fast food.     Lifestyle  1. When eating at a restaurant, ask that your food be prepared with less salt or no salt, if possible.  2. If you drink alcohol:  1. Limit how much you use to:  § 0-1 drink a day for women who are not pregnant.  § 0-2 drinks a day for men.  2. Be aware of how much alcohol is in your drink. In the [place] one drink equals one  12 oz bottle of beer (355 mL), one 5 oz glass of wine (148 mL), or one 1½ oz glass of hard liquor (44 mL).  General information  · Avoid eating more than 2,300 mg of salt a day. If you have hypertension, you may need to reduce your sodium intake to 1,500 mg a day.  · Work with your health care provider to maintain a healthy body weight or to lose weight. Ask what an ideal weight is for you.  · Get at least 30 minutes of exercise that causes your heart to beat faster (aerobic exercise) most days of the week. Activities may include walking, swimming, or biking.  · Work with your health care provider or dietitian to adjust your eating plan to your individual calorie needs.  What foods should I eat?  Fruits  All fresh, dried, or frozen fruit. Canned fruit in natural juice (without added sugar).  Vegetables  Fresh or frozen vegetables (raw, steamed, roasted, or grilled). Low-sodium or reduced-sodium tomato and vegetable juice. Low-sodium or reduced-sodium tomato sauce and tomato paste. Low-sodium or reduced-sodium canned vegetables.  Grains  Whole-grain or whole-wheat bread. Whole-grain or whole-wheat pasta. Brown rice. Oatmeal. Quinoa. Bulgur. Whole-grain and low-sodium cereals. Юлия bread. Low-fat, low-sodium crackers. Whole-wheat flour tortillas.  Meats and other proteins  Skinless chicken or turkey. Ground chicken or turkey. Pork with fat trimmed off. Fish and seafood. Egg whites. Dried beans, peas, or lentils. Unsalted nuts, nut butters, and seeds. Unsalted canned beans. Lean cuts of beef with fat trimmed off. Low-sodium, lean precooked or cured meat, such as sausages or meat loaves.  Dairy  Low-fat (1%) or fat-free (skim) milk. Reduced-fat, low-fat, or fat-free cheeses. Nonfat, low-sodium ricotta or cottage cheese. Low-fat or nonfat yogurt. Low-fat, low-sodium cheese.  Fats and oils  Soft margarine without trans fats. Vegetable oil. Reduced-fat, low-fat, or light mayonnaise and salad dressings (reduced-sodium).  Canola, safflower, olive, avocado, soybean, and sunflower oils. Avocado.  Seasonings and condiments  Herbs. Spices. Seasoning mixes without salt.  Other foods  Unsalted popcorn and pretzels. Fat-free sweets.  The items listed above may not be a complete list of foods and beverages you can eat. Contact a dietitian for more information.  What foods should I avoid?  Fruits  Canned fruit in a light or heavy syrup. Fried fruit. Fruit in cream or butter sauce.  Vegetables  Creamed or fried vegetables. Vegetables in a cheese sauce. Regular canned vegetables (not low-sodium or reduced-sodium). Regular canned tomato sauce and paste (not low-sodium or reduced-sodium). Regular tomato and vegetable juice (not low-sodium or reduced-sodium). Pickles. Olives.  Grains  Baked goods made with fat, such as croissants, muffins, or some breads. Dry pasta or rice meal packs.  Meats and other proteins  Fatty cuts of meat. Ribs. Fried meat. [place]. Bologna, salami, and other precooked or cured meats, such as sausages or meat loaves. Fat from the back of a pig (fatback). Bratwurst. Salted nuts and seeds. Canned beans with added salt. Canned or smoked fish. Whole eggs or egg yolks. Chicken or turkey with skin.  Dairy  Whole or 2% milk, cream, and half-and-half. Whole or full-fat cream cheese. Whole-fat or sweetened yogurt. Full-fat cheese. Nondairy creamers. Whipped toppings. Processed cheese and cheese spreads.  Fats and oils  Butter. Stick margarine. Lard. Shortening. Ghee. [place] fat. Tropical oils, such as coconut, palm kernel, or palm oil.  Seasonings and condiments  Onion salt, garlic salt, seasoned salt, table salt, and sea salt. Worcestershire sauce. Tartar sauce. Barbecue sauce. Teriyaki sauce. Soy sauce, including reduced-sodium. Steak sauce. Canned and packaged gravies. Fish sauce. Oyster sauce. Cocktail sauce. Store-bought horseradish. Ketchup. Mustard. Meat flavorings and tenderizers. Bouillon cubes. Hot sauces. Pre-made or  packaged marinades. Pre-made or packaged taco seasonings. Relishes. Regular salad dressings.  Other foods  Salted popcorn and pretzels.  The items listed above may not be a complete list of foods and beverages you should avoid. Contact a dietitian for more information.  Where to find more information  · National Heart, Lung, and Blood [place]: www.nhlbi.nih.gov  · American Heart Association: www.heart.org  · Academy of Nutrition and Dietetics: www.eatright.org  · National Kidney Foundation: www.kidney.org  Summary  · The DASH eating plan is a healthy eating plan that has been shown to reduce high blood pressure (hypertension). It may also reduce your risk for type 2 diabetes, heart disease, and stroke.  · When on the DASH eating plan, aim to eat more fresh fruits and vegetables, whole grains, lean proteins, low-fat dairy, and heart-healthy fats.  · With the DASH eating plan, you should limit salt (sodium) intake to 2,300 mg a day. If you have hypertension, you may need to reduce your sodium intake to 1,500 mg a day.  · Work with your health care provider or dietitian to adjust your eating plan to your individual calorie needs.  This information is not intended to replace advice given to you by your health care provider. Make sure you discuss any questions you have with your health care provider.  Document Revised: 11/20/2020 Document Reviewed: 11/20/2020  [employer] Patient Education © 2021 [employer]       Heart-Healthy Eating Plan  Heart-healthy meal planning includes:  · Eating less unhealthy fats.  · Eating more healthy fats.  · Making other changes in your diet.  Talk with your doctor or a diet specialist (dietitian) to create an eating plan that is right for you.  What is my plan?  Your doctor may recommend an eating plan that includes:  · Total fat: ______% or less of total calories a day.  · Saturated fat: ______% or less of total calories a day.  · Cholesterol: less than _________mg a day.  What are tips  for following this plan?  Cooking  Avoid frying your food. Try to bake, boil, grill, or broil it instead. You can also reduce fat by:  · Removing the skin from poultry.  · Removing all visible fats from meats.  · Steaming vegetables in water or broth.  Meal planning       1. At meals, divide your plate into four equal parts:  ? Fill one-half of your plate with vegetables and green salads.  ? Fill one-fourth of your plate with whole grains.  ? Fill one-fourth of your plate with lean protein foods.  2. Eat 4-5 servings of vegetables per day. A serving of vegetables is:  ? 1 cup of raw or cooked vegetables.  ? 2 cups of raw leafy greens.  3. Eat 4-5 servings of fruit per day. A serving of fruit is:  ? 1 medium whole fruit.  ? ¼ cup of dried fruit.  ? ½ cup of fresh, frozen, or canned fruit.  ? ½ cup of 100% fruit juice.  4. Eat more foods that have soluble fiber. These are apples, broccoli, carrots, beans, peas, and barley. Try to get 20-30 g of fiber per day.  5. Eat 4-5 servings of nuts, legumes, and seeds per week:  ? 1 serving of dried beans or legumes equals ½ cup after being cooked.  ? 1 serving of nuts is ¼ cup.  ? 1 serving of seeds equals 1 tablespoon.     General information  · Eat more home-cooked food. Eat less restaurant, buffet, and fast food.  · Limit or avoid alcohol.  · Limit foods that are high in starch and sugar.  · Avoid fried foods.  · Lose weight if you are overweight.  · Keep track of how much salt (sodium) you eat. This is important if you have high blood pressure. Ask your doctor to tell you more about this.  · Try to add vegetarian meals each week.  Fats  1. Choose healthy fats. These include olive oil and canola oil, flaxseeds, walnuts, almonds, and seeds.  2. Eat more omega-3 fats. These include salmon, mackerel, sardines, tuna, flaxseed oil, and ground flaxseeds. Try to eat fish at least 2 times each week.  3. Check food labels. Avoid foods with trans fats or high amounts of saturated  fat.  4. Limit saturated fats.  ? These are often found in animal products, such as meats, butter, and cream.  ? These are also found in plant foods, such as palm oil, palm kernel oil, and coconut oil.  5. Avoid foods with partially hydrogenated oils in them. These have trans fats. Examples are stick margarine, some tub margarines, cookies, crackers, and other baked goods.  What foods can I eat?  Fruits  All fresh, canned (in natural juice), or frozen fruits.  Vegetables  Fresh or frozen vegetables (raw, steamed, roasted, or grilled). Green salads.  Grains  Most grains. Choose whole wheat and whole grains most of the time. Rice and pasta, including brown rice and pastas made with whole wheat.  Meats and other proteins  Lean, well-trimmed beef, veal, pork, and lamb. Chicken and turkey without skin. All fish and shellfish. Wild duck, rabbit, pheasant, and venison. Egg whites or low-cholesterol egg substitutes. Dried beans, peas, lentils, and tofu. Seeds and most nuts.  Dairy  Low-fat or nonfat cheeses, including ricotta and mozzarella. Skim or 1% milk that is liquid, powdered, or evaporated. Buttermilk that is made with low-fat milk. Nonfat or low-fat yogurt.  Fats and oils  Non-hydrogenated (trans-free) margarines. Vegetable oils, including soybean, sesame, sunflower, olive, peanut, safflower, corn, canola, and cottonseed. Salad dressings or mayonnaise made with a vegetable oil.  Beverages  Mineral water. Coffee and tea. Diet carbonated beverages.  Sweets and desserts  Sherbet, gelatin, and fruit ice. Small amounts of dark chocolate.  Limit all sweets and desserts.  Seasonings and condiments  All seasonings and condiments.  The items listed above may not be a complete list of foods and drinks you can eat. Contact a dietitian for more options.  What foods should I avoid?  Fruits  Canned fruit in heavy syrup. Fruit in cream or butter sauce. Fried fruit. Limit coconut.  Vegetables  Vegetables cooked in cheese, cream, or  butter sauce. Fried vegetables.  Grains  Breads that are made with saturated or trans fats, oils, or whole milk. Croissants. Sweet rolls. Donuts. High-fat crackers, such as cheese crackers.  Meats and other proteins  Fatty meats, such as hot dogs, ribs, sausage, salas, rib-eye roast or steak. High-fat deli meats, such as salami and bologna. Caviar. Domestic duck and goose. Organ meats, such as liver.  Dairy  Cream, sour cream, cream cheese, and creamed cottage cheese. Whole-milk cheeses. Whole or 2% milk that is liquid, evaporated, or condensed. Whole buttermilk. Cream sauce or high-fat cheese sauce. Yogurt that is made from whole milk.  Fats and oils  Meat fat, or shortening. Cocoa butter, hydrogenated oils, palm oil, coconut oil, palm kernel oil. Solid fats and shortenings, including salas fat, salt pork, lard, and butter. Nondairy cream substitutes. Salad dressings with cheese or sour cream.  Beverages  Regular sodas and juice drinks with added sugar.  Sweets and desserts  Frosting. Pudding. Cookies. Cakes. Pies. Milk chocolate or white chocolate. Buttered syrups. Full-fat ice cream or ice cream drinks.  The items listed above may not be a complete list of foods and drinks to avoid. Contact a dietitian for more information.  Summary  · Heart-healthy meal planning includes eating less unhealthy fats, eating more healthy fats, and making other changes in your diet.  · Eat a balanced diet. This includes fruits and vegetables, low-fat or nonfat dairy, lean protein, nuts and legumes, whole grains, and heart-healthy oils and fats.  This information is not intended to replace advice given to you by your health care provider. Make sure you discuss any questions you have with your health care provider.  Document Revised: 02/21/2019 Document Reviewed: 01/25/2019  [employer] Patient Education © 2021 [employer]       Mediterranean Diet  A Mediterranean diet refers to food and lifestyle choices that are based on the  traditions of countries located on the [place]. This way of eating has been shown to help prevent certain conditions and improve outcomes for people who have chronic diseases, like kidney disease and heart disease.  What are tips for following this plan?  Lifestyle  1. Cook and eat meals together with your family, when possible.  2. Drink enough fluid to keep your urine clear or pale yellow.  3. Be physically active every day. This includes:  ? Aerobic exercise like running or swimming.  ? Leisure activities like gardening, walking, or housework.  4. Get 7-8 hours of sleep each night.  5. If recommended by your health care provider, drink red wine in moderation. This means 1 glass a day for nonpregnant women and 2 glasses a day for men. A glass of wine equals 5 oz (150 mL).  Reading food labels       · Check the serving size of packaged foods. For foods such as rice and pasta, the serving size refers to the amount of cooked product, not dry.  · Check the total fat in packaged foods. Avoid foods that have saturated fat or trans fats.  · Check the ingredients list for added sugars, such as corn syrup.     Shopping  1. At the grocery store, buy most of your food from the areas near the walls of the store. This includes:  ? Fresh fruits and vegetables (produce).  ? Grains, beans, nuts, and seeds. Some of these may be available in unpackaged forms or large amounts (in bulk).  ? Fresh seafood.  ? Poultry and eggs.  ? Low-fat dairy products.  2. Buy whole ingredients instead of prepackaged foods.  3. Buy fresh fruits and vegetables in-season from local farmers markets.  4. Buy frozen fruits and vegetables in resealable bags.  5. If you do not have access to quality fresh seafood, buy precooked frozen shrimp or canned fish, such as tuna, salmon, or sardines.  6. Buy small amounts of raw or cooked vegetables, salads, or olives from the deli or salad bar at your store.  7. Stock your pantry so you always have  certain foods on hand, such as olive oil, canned tuna, canned tomatoes, rice, pasta, and beans.  Cooking  · Cook foods with extra-virgin olive oil instead of using butter or other vegetable oils.  · Have meat as a side dish, and have vegetables or grains as your main dish. This means having meat in small portions or adding small amounts of meat to foods like pasta or stew.  · Use beans or vegetables instead of meat in common dishes like chili or lasagna.  · [place] with different cooking methods. Try roasting or broiling vegetables instead of steaming or sautéeing them.  · Add frozen vegetables to soups, stews, pasta, or rice.  · Add nuts or seeds for added healthy fat at each meal. You can add these to yogurt, salads, or vegetable dishes.  · Marinate fish or vegetables using olive oil, lemon juice, garlic, and fresh herbs.  Meal planning       1. Plan to eat 1 vegetarian meal one day each week. Try to work up to 2 vegetarian meals, if possible.  2. Eat seafood 2 or more times a week.  3. Have healthy snacks readily available, such as:  ? Vegetable sticks with hummus.  ? Greek yogurt.  ? Fruit and nut trail mix.  4. Eat balanced meals throughout the week. This includes:  ? Fruit: 2-3 servings a day  ? Vegetables: 4-5 servings a day  ? Low-fat dairy: 2 servings a day  ? Fish, poultry, or lean meat: 1 serving a day  ? Beans and legumes: 2 or more servings a week  ? Nuts and seeds: 1-2 servings a day  ? Whole grains: 6-8 servings a day  ? Extra-virgin olive oil: 3-4 servings a day  5. Limit red meat and sweets to only a few servings a month     What are my food choices?  1. Mediterranean diet  1. Recommended  § Grains: Whole-grain pasta. Brown rice. Bulgar wheat. Polenta. Couscous. Whole-wheat bread. Oatmeal. Quinoa.  § Vegetables: Artichokes. Beets. Broccoli. Cabbage. Carrots. Eggplant. Green beans. Chard. Kale. Spinach. Onions. Leeks. Peas. Squash. Tomatoes. Peppers. Radishes.  § Fruits: Apples. Apricots. Avocado.  Berries. Bananas. Cherries. Dates. Figs. Grapes. Tomás. Melon. Oranges. Peaches. Plums. Pomegranate.  § Meats and other protein foods: Beans. Almonds. Sunflower seeds. Pine nuts. Peanuts. Cod. Manson. Scallops. Shrimp. Tuna. Tilapia. Clams. Oysters. Eggs.  § Dairy: Low-fat milk. Cheese. Greek yogurt.  § Beverages: Water. Red wine. Herbal tea.  § Fats and oils: Extra virgin olive oil. Avocado oil. Grape seed oil.  § Sweets and desserts: Greek yogurt with honey. Baked apples. Poached pears. Trail mix.  § Seasoning and other foods: Basil. Cilantro. Coriander. Cumin. Mint. Parsley. Isreal. Rosemary. Tarragon. Garlic. Oregano. Thyme. Pepper. Balsalmic vinegar. Tahini. Hummus. Tomato sauce. Olives. Mushrooms.  2. Limit these  § Grains: Prepackaged pasta or rice dishes. Prepackaged cereal with added sugar.  § Vegetables: Deep fried potatoes (french fries).  § Fruits: Fruit canned in syrup.  § Meats and other protein foods: Beef. Pork. Lamb. Poultry with skin. Hot dogs. Wilson.  § Dairy: Ice cream. Sour cream. Whole milk.  § Beverages: Juice. Sugar-sweetened soft drinks. Beer. Liquor and spirits.  § Fats and oils: Butter. Canola oil. Vegetable oil. Beef fat (tallow). Lard.  § Sweets and desserts: Cookies. Cakes. Pies. Candy.  § Seasoning and other foods: Mayonnaise. Premade sauces and marinades.  The items listed may not be a complete list. Talk with your dietitian about what dietary choices are right for you.  Summary  · The Mediterranean diet includes both food and lifestyle choices.  · Eat a variety of fresh fruits and vegetables, beans, nuts, seeds, and whole grains.  · Limit the amount of red meat and sweets that you eat.  · Talk with your health care provider about whether it is safe for you to drink red wine in moderation. This means 1 glass a day for nonpregnant women and 2 glasses a day for men. A glass of wine equals 5 oz (150 mL).  This information is not intended to replace advice given to you by your health care  provider. Make sure you discuss any questions you have with your health care provider.  Document Revised: 08/17/2017 Document Reviewed: 08/10/2017  [employer] Patient Education © 2020 [employer]         Exercising to Stay Healthy  To become healthy and stay healthy, it is recommended that you do moderate-intensity and vigorous-intensity exercise. You can tell that you are exercising at a moderate intensity if your heart starts beating faster and you start breathing faster but can still hold a conversation. You can tell that you are exercising at a vigorous intensity if you are breathing much harder and faster and cannot hold a conversation while exercising.  Exercising regularly is important. It has many health benefits, such as:  · Improving overall fitness, flexibility, and endurance.  · Increasing bone density.  · Helping with weight control.  · Decreasing body fat.  · Increasing muscle strength.  · Reducing stress and tension.  · Improving overall health.  How often should I exercise?  Choose an activity that you enjoy, and set realistic goals. Your health care provider can help you make an activity plan that works for you.  Exercise regularly as told by your health care provider. This may include:  1. Doing strength training two times a week, such as:  ? Lifting weights.  ? Using resistance bands.  ? Push-ups.  ? Sit-ups.  ? Yoga.  2. Doing a certain intensity of exercise for a given amount of time. Choose from these options:  ? A total of 150 minutes of moderate-intensity exercise every week.  ? A total of 75 minutes of vigorous-intensity exercise every week.  ? A mix of moderate-intensity and vigorous-intensity exercise every week.  Children, pregnant women, people who have not exercised regularly, people who are overweight, and older adults may need to talk with a health care provider about what activities are safe to do. If you have a medical condition, be sure to talk with your health care provider before  you start a new exercise program.  What are some exercise ideas?    Moderate-intensity exercise ideas include:  · Walking 1 mile (1.6 km) in about 15 minutes.  · Biking.  · Hiking.  · Golfing.  · Dancing.  · Water aerobics.  Vigorous-intensity exercise ideas include:  · Walking 4.5 miles (7.2 km) or more in about 1 hour.  · Jogging or running 5 miles (8 km) in about 1 hour.  · Biking 10 miles (16.1 km) or more in about 1 hour.  · Lap swimming.  · Roller-skating or in-line skating.  · Cross-country skiing.  · Vigorous competitive sports, such as football, basketball, and soccer.  · Jumping rope.  · Aerobic dancing.  What are some everyday activities that can help me to get exercise?  1. Yard work, such as:  ? Pushing a .  ? Raking and bagging leaves.  2. Washing your car.  3. Pushing a stroller.  4. Shoveling snow.  5. Gardening.  6. Washing windows or floors.  How can I be more active in my day-to-day activities?  · Use stairs instead of an elevator.  · Take a walk during your lunch break.  · If you drive, park your car farther away from your work or school.  · If you take public transportation, get off one stop early and walk the rest of the way.  · Stand up or walk around during all of your indoor phone calls.  · Get up, stretch, and walk around every 30 minutes throughout the day.  · Enjoy exercise with a friend. Support to continue exercising will help you keep a regular routine of activity.  What guidelines can I follow while exercising?  · Before you start a new exercise program, talk with your health care provider.  · Do not exercise so much that you hurt yourself, feel dizzy, or get very short of breath.  · Wear comfortable clothes and wear shoes with good support.  · Drink plenty of water while you exercise to prevent dehydration or heat stroke.  · Work out until your breathing and your heartbeat get faster.  Where to find more information  · [place] Department of Health and Human Services:  www.hhs.gov  · Centers for Disease Control and Prevention (CDC): www.cdc.gov  Summary  · Exercising regularly is important. It will improve your overall fitness, flexibility, and endurance.  · Regular exercise also will improve your overall health. It can help you control your weight, reduce stress, and improve your bone density.  · Do not exercise so much that you hurt yourself, feel dizzy, or get very short of breath.  · Before you start a new exercise program, talk with your health care provider.  This information is not intended to replace advice given to you by your health care provider. Make sure you discuss any questions you have with your health care provider.  Document Revised: 11/30/2018 Document Reviewed: 11/08/2018  [employer] Patient Education © 2021 [employer]           Mindfulness-Based Stress Reduction  Mindfulness-based stress reduction (MBSR) is a program that helps people learn to practice mindfulness. Mindfulness is the practice of intentionally paying attention to the present moment. It can be learned and practiced through techniques such as education, breathing exercises, meditation, and yoga. MBSR includes several mindfulness techniques in one program.  MBSR works best when you understand the treatment, are willing to try new things, and can commit to spending time practicing what you learn. MBSR training may include learning about:  · How your emotions, thoughts, and reactions affect your body.  · New ways to respond to things that cause negative thoughts to start (triggers).  · How to notice your thoughts and let go of them.  · Practicing awareness of everyday things that you normally do without thinking.  · The techniques and goals of different types of meditation.  What are the benefits of MBSR?  MBSR can have many benefits, which include helping you to:  · Develop self-awareness. This refers to knowing and understanding yourself.  · Learn skills and attitudes that help you to participate  in your own health care.  · Learn new ways to care for yourself.  · Be more accepting about how things are, and let things go.  · Be less judgmental and approach things with an open mind.  · Be patient with yourself and trust yourself more.  MBSR has also been shown to:  · Reduce negative emotions, such as depression and anxiety.  · Improve memory and focus.  · Change how you sense and approach pain.  · Boost your body's ability to fight infections.  · Help you connect better with other people.  · Improve your sense of well-being.  Follow these instructions at home:       1. Find a local in-person or online MBSR program.  2. Set aside some time regularly for mindfulness practice.  3. Find a mindfulness practice that works best for you. This may include one or more of the following:  ? Meditation. Meditation involves focusing your mind on a certain thought or activity.  ? Breathing awareness exercises. These help you to stay present by focusing on your breath.  ? Body scan. For this practice, you lie down and pay attention to each part of your body from head to toe. You can identify tension and soreness and intentionally relax parts of your body.  ? Yoga. Yoga involves stretching and breathing, and it can improve your ability to move and be flexible. It can also provide an experience of testing your body's limits, which can help you release stress.  ? Mindful eating. This way of eating involves focusing on the taste, texture, color, and smell of each bite of food. Because this slows down eating and helps you feel full sooner, it can be an important part of a weight-loss plan.  4. Find a podcast or recording that provides guidance for breathing awareness, body scan, or meditation exercises. You can listen to these any time when you have a free moment to rest without distractions.  5. Follow your treatment plan as told by your health care provider. This may include taking regular medicines and making changes to your  diet or lifestyle as recommended.  How to practice mindfulness  To do a basic awareness exercise:  · Find a comfortable place to sit.  · Pay attention to the present moment. Observe your thoughts, feelings, and surroundings just as they are.  · Avoid placing judgment on yourself, your feelings, or your surroundings. Make note of any judgment that comes up, and let it go.  · Your mind may wander, and that is okay. Make note of when your thoughts drift, and return your attention to the present moment.  To do basic mindfulness meditation:  1. Find a comfortable place to sit. This may include a stable chair or a firm floor cushion.  ? Sit upright with your back straight. Let your arms fall next to your side with your hands resting on your legs.  ? If sitting in a chair, rest your feet flat on the floor.  ? If sitting on a cushion, cross your legs in front of you.  2. Keep your head in a neutral position with your chin dropped slightly. Relax your jaw and rest the tip of your tongue on the roof of your mouth. Drop your gaze to the floor. You can close your eyes if you like.  3. Breathe normally and pay attention to your breath. Feel the air moving in and out of your nose. Feel your belly expanding and relaxing with each breath.  4. Your mind may wander, and that is okay. Make note of when your thoughts drift, and return your attention to your breath.  5. Avoid placing judgment on yourself, your feelings, or your surroundings. Make note of any judgment or feelings that come up, let them go, and bring your attention back to your breath.  6. When you are ready, lift your gaze or open your eyes. Pay attention to how your body feels after the meditation.  Where to find more information  You can find more information about MBSR from:  · Your health care provider.  · Community-based meditation centers or programs.  · Programs offered near you.  Summary  · Mindfulness-based stress reduction (MBSR) is a program that teaches you  how to intentionally pay attention to the present moment. It is used with other treatments to help you cope better with daily stress, emotions, and pain.  · MBSR focuses on developing self-awareness, which allows you to respond to life stress without judgment or negative emotions.  · MBSR programs may involve learning different mindfulness practices, such as breathing exercises, meditation, yoga, body scan, or mindful eating. Find a mindfulness practice that works best for you, and set aside time for it on a regular basis.  This information is not intended to replace advice given to you by your health care provider. Make sure you discuss any questions you have with your health care provider.  Document Revised: 02/22/2021 Document Reviewed: 04/26/2018  Elsevier Patient Education © 2021 [employer] Inc.

## 2021-12-03 DIAGNOSIS — E78.2 MIXED HYPERLIPIDEMIA: Primary | ICD-10-CM

## 2021-12-03 DIAGNOSIS — R73.03 PREDIABETES: ICD-10-CM

## 2021-12-03 LAB
ALBUMIN SERPL-MCNC: 4.6 G/DL (ref 3.5–5.2)
ALBUMIN/GLOB SERPL: 1.9 G/DL
ALP SERPL-CCNC: 91 U/L (ref 39–117)
ALT SERPL W P-5'-P-CCNC: 25 U/L (ref 1–33)
ANION GAP SERPL CALCULATED.3IONS-SCNC: 13.1 MMOL/L (ref 5–15)
AST SERPL-CCNC: 27 U/L (ref 1–32)
BASOPHILS # BLD AUTO: 0.05 10*3/MM3 (ref 0–0.2)
BASOPHILS NFR BLD AUTO: 1 % (ref 0–1.5)
BILIRUB SERPL-MCNC: 0.4 MG/DL (ref 0–1.2)
BUN SERPL-MCNC: 15 MG/DL (ref 6–20)
BUN/CREAT SERPL: 19.7 (ref 7–25)
CALCIUM SPEC-SCNC: 9.6 MG/DL (ref 8.6–10.5)
CHLORIDE SERPL-SCNC: 100 MMOL/L (ref 98–107)
CHOLEST SERPL-MCNC: 230 MG/DL (ref 0–200)
CO2 SERPL-SCNC: 23.9 MMOL/L (ref 22–29)
CREAT SERPL-MCNC: 0.76 MG/DL (ref 0.57–1)
DEPRECATED RDW RBC AUTO: 41.6 FL (ref 37–54)
EOSINOPHIL # BLD AUTO: 0.08 10*3/MM3 (ref 0–0.4)
EOSINOPHIL NFR BLD AUTO: 1.6 % (ref 0.3–6.2)
ERYTHROCYTE [DISTWIDTH] IN BLOOD BY AUTOMATED COUNT: 12.2 % (ref 12.3–15.4)
GFR SERPL CREATININE-BSD FRML MDRD: 78 ML/MIN/1.73
GLOBULIN UR ELPH-MCNC: 2.4 GM/DL
GLUCOSE SERPL-MCNC: 75 MG/DL (ref 65–99)
HBA1C MFR BLD: 6.1 % (ref 4.8–5.6)
HCT VFR BLD AUTO: 41.4 % (ref 34–46.6)
HDLC SERPL-MCNC: 67 MG/DL (ref 40–60)
HGB BLD-MCNC: 13.9 G/DL (ref 12–15.9)
IMM GRANULOCYTES # BLD AUTO: 0.02 10*3/MM3 (ref 0–0.05)
IMM GRANULOCYTES NFR BLD AUTO: 0.4 % (ref 0–0.5)
LDLC SERPL CALC-MCNC: 142 MG/DL (ref 0–100)
LDLC/HDLC SERPL: 2.07 {RATIO}
LYMPHOCYTES # BLD AUTO: 1.71 10*3/MM3 (ref 0.7–3.1)
LYMPHOCYTES NFR BLD AUTO: 33.3 % (ref 19.6–45.3)
MCH RBC QN AUTO: 31.2 PG (ref 26.6–33)
MCHC RBC AUTO-ENTMCNC: 33.6 G/DL (ref 31.5–35.7)
MCV RBC AUTO: 92.8 FL (ref 79–97)
MONOCYTES # BLD AUTO: 0.43 10*3/MM3 (ref 0.1–0.9)
MONOCYTES NFR BLD AUTO: 8.4 % (ref 5–12)
NEUTROPHILS NFR BLD AUTO: 2.85 10*3/MM3 (ref 1.7–7)
NEUTROPHILS NFR BLD AUTO: 55.3 % (ref 42.7–76)
NRBC BLD AUTO-RTO: 0 /100 WBC (ref 0–0.2)
PLATELET # BLD AUTO: 254 10*3/MM3 (ref 140–450)
PMV BLD AUTO: 10.1 FL (ref 6–12)
POTASSIUM SERPL-SCNC: 4.9 MMOL/L (ref 3.5–5.2)
PROT SERPL-MCNC: 7 G/DL (ref 6–8.5)
RBC # BLD AUTO: 4.46 10*6/MM3 (ref 3.77–5.28)
SARS-COV-2 N GENE RESP QL NAA+PROBE: NOT DETECTED
SODIUM SERPL-SCNC: 137 MMOL/L (ref 136–145)
T4 FREE SERPL-MCNC: 1.34 NG/DL (ref 0.93–1.7)
TRIGL SERPL-MCNC: 121 MG/DL (ref 0–150)
TSH SERPL DL<=0.05 MIU/L-ACNC: 2.33 UIU/ML (ref 0.27–4.2)
VLDLC SERPL-MCNC: 21 MG/DL (ref 5–40)
WBC NRBC COR # BLD: 5.14 10*3/MM3 (ref 3.4–10.8)

## 2021-12-03 RX ORDER — ATORVASTATIN CALCIUM 20 MG/1
20 TABLET, FILM COATED ORAL DAILY
Qty: 90 TABLET | Refills: 3 | Status: SHIPPED | OUTPATIENT
Start: 2021-12-03 | End: 2022-11-16 | Stop reason: SDUPTHER

## 2021-12-03 NOTE — PROGRESS NOTES
Spoke with over the phone.  Verified ID with .    Lipids elevated.  Will switch from simvastatin to atorvastatin.    A1c 6.1%.  This is in the prediabetic range.  Recommended regular exercise, portion control, and weight loss.    CMP WNL.  CBC WNL.  Thyroid studies at goal.  PCR covid testing was negative.

## 2021-12-23 ENCOUNTER — HOSPITAL ENCOUNTER (OUTPATIENT)
Dept: CT IMAGING | Facility: HOSPITAL | Age: 56
Discharge: HOME | End: 2021-12-23
Admitting: STUDENT IN AN ORGANIZED HEALTH CARE EDUCATION/TRAINING PROGRAM

## 2021-12-23 DIAGNOSIS — F17.211 CIGARETTE NICOTINE DEPENDENCE IN REMISSION: ICD-10-CM

## 2021-12-23 PROCEDURE — 71271 CT THORAX LUNG CANCER SCR C-: CPT

## 2021-12-24 NOTE — PROGRESS NOTES
Low dose CT shows small calcified nodule on the left side, and another left lower lobe nodule that is not calcified.  Per radiology, these are benign appearing, with a very low likelihood of malignancy.  We will obtain a repeat low dose CT in one year.

## 2021-12-27 ENCOUNTER — TELEPHONE (OUTPATIENT)
Dept: INTERNAL MEDICINE | Facility: CLINIC | Age: 56
End: 2021-12-27

## 2021-12-27 NOTE — TELEPHONE ENCOUNTER
----- Message from Chito Hernandes MD sent at 12/24/2021 11:52 AM EST -----  Low dose CT shows small calcified nodule on the left side, and another left lower lobe nodule that is not calcified.  Per radiology, these are benign appearing, with a very low likelihood of malignancy.  We will obtain a repeat low dose CT in one year.

## 2021-12-29 ENCOUNTER — APPOINTMENT (OUTPATIENT)
Dept: CT IMAGING | Facility: HOSPITAL | Age: 56
End: 2021-12-29

## 2022-01-14 ENCOUNTER — TELEPHONE (OUTPATIENT)
Dept: INTERNAL MEDICINE | Facility: CLINIC | Age: 57
End: 2022-01-14

## 2022-01-14 NOTE — TELEPHONE ENCOUNTER
PER  [employer]  (HANDLES ALL MEDICAL RECORDS SAFE KEEPING FOR DR. Cooper  PATIENTS), MR. & MRS RAMIREZ GOT COPIES OF THEIR MEDICAL RECORDS AND [employer] WILL NOT SEND US A COPY ANYMORE. REQUESTED MRS. JOHN DOLAN IF WE CAN HAVE A COPY OF THEIR MEDICAL RECORDS BUT SHE REFUSED TO GIVE US A COPY. MANY THANKS

## 2022-01-17 NOTE — TELEPHONE ENCOUNTER
ThanksWilberto.  I sent Neelam a message and asked her to look into this.    Do we have her consent in place for medical records requests?

## 2022-01-20 ENCOUNTER — LAB (OUTPATIENT)
Dept: LAB | Facility: HOSPITAL | Age: 57
End: 2022-01-20

## 2022-01-20 ENCOUNTER — TELEPHONE (OUTPATIENT)
Dept: INTERNAL MEDICINE | Facility: CLINIC | Age: 57
End: 2022-01-20

## 2022-01-20 DIAGNOSIS — R05.9 COUGH: Primary | ICD-10-CM

## 2022-01-20 PROCEDURE — U0004 COV-19 TEST NON-CDC HGH THRU: HCPCS

## 2022-01-20 PROCEDURE — C9803 HOPD COVID-19 SPEC COLLECT: HCPCS

## 2022-01-20 RX ORDER — BROMPHENIRAMINE MALEATE, PSEUDOEPHEDRINE HYDROCHLORIDE, AND DEXTROMETHORPHAN HYDROBROMIDE 2; 30; 10 MG/5ML; MG/5ML; MG/5ML
10 SYRUP ORAL 4 TIMES DAILY PRN
Qty: 473 ML | Refills: 0 | Status: SHIPPED | OUTPATIENT
Start: 2022-01-20 | End: 2022-11-16

## 2022-01-20 NOTE — TELEPHONE ENCOUNTER
OVERDUE LAB ORDERS PROTOCOL    ALL IMAGING AND PEDS ORDERS FORWARD AND ASK THE PROVIDERS    ALL BLOODWORK FOR ADULTS CAN BE CANCELLED    PT(PATIENT) HAS OVERDUE LAB ORDERS   Mammo Screening Digital Tomosynthesis Bilateral With CAD (12/02/2021 09:45)  DEXA Bone Density Axial (12/02/2021 14:56)    PT(PATIENT) HAS BEEN SENT A LETTER  Letter from Chito Hernandes MD (12/20/2021)    NO RESPONSE FROM PT(PATIENT)     PROVIDER PLEASE ADVISE    WOULD PROVIDER LIKE TO RESCHEDULE ABOVE ORDERS OR CANCEL ABOVE ORDERS?

## 2022-01-20 NOTE — TELEPHONE ENCOUNTER
I would recommend COVID testing, which we can do as an MA visit.  I have sent a prescription for cough medicine to her pharmacy.  It would be prudent to quarantine given at any rate.  I would recommend quarantine pending COVID test results for at least 5 days from first day of symptoms.  If completely without symptoms, can leave quarantine on day 6 so long as she wears a tight-fitting mask around others and in all public venues.

## 2022-01-20 NOTE — TELEPHONE ENCOUNTER
Caller: Gualberto Gloria    Relationship: Self    Best call back number: [phone]    What medication are you requesting: FOR COUGH    What are your current symptoms: COUGHING AND FATIGUED AND SCRATCHY THROAT AND HEADACHE    How long have you been experiencing symptoms: STARTED YESTERDAY    Have you had these symptoms before:    [] Yes  [x] No    Have you been treated for these symptoms before:   [] Yes  [x] No    If a prescription is needed, what is your preferred pharmacy and phone number:      [facility] [address] [place] [address] - [address] [facility] [phone] [facility] [phone]   [phone]    Additional notes: PATIENT DID NOT WANT TO DO A VISIT

## 2022-01-21 ENCOUNTER — TELEPHONE (OUTPATIENT)
Dept: INTERNAL MEDICINE | Facility: CLINIC | Age: 57
End: 2022-01-21

## 2022-01-21 DIAGNOSIS — U07.1 COVID-19 VIRUS INFECTION: Primary | ICD-10-CM

## 2022-01-21 LAB — SARS-COV-2 RNA PNL SPEC NAA+PROBE: DETECTED

## 2022-01-21 RX ORDER — TRIAMCINOLONE ACETONIDE 55 UG/1
2 SPRAY, METERED NASAL DAILY
Qty: 16.5 G | Refills: 11 | Status: SHIPPED | OUTPATIENT
Start: 2022-01-21 | End: 2022-11-16

## 2022-01-21 RX ORDER — IBUPROFEN 800 MG/1
800 TABLET ORAL EVERY 6 HOURS PRN
Qty: 30 TABLET | Refills: 0 | Status: SHIPPED | OUTPATIENT
Start: 2022-01-21

## 2022-01-21 NOTE — TELEPHONE ENCOUNTER
PT VERIFIED     CONTACTED PT PER PROVIDER'S INSTRUCTIONS    PT(PATIENT) STATED THAT SHE HAS BODY ACHES AND PAIN    PT(PATIENT) WAS ADVISED TO USE OTC TYLENOL AND IBUPROFEN BOTH FOR PAIN AND FEVER CONTROL    PT(PATIENT) STATES OTC PAIN MEDICATION DOES NOT WORK FOR HER    PT(PATIENT) IS REQUESTING A SCRIPT FOR PAIN MEDICATION    PROVIDER PLEASE ADVISE

## 2022-01-21 NOTE — TELEPHONE ENCOUNTER
I have reviewed documentation from Edie Balbuena and Neelam Faustin.  I've sent an 800 mg tablet for ibuprofen.

## 2022-01-21 NOTE — TELEPHONE ENCOUNTER
PT VERIFIED     CONTACTED PT PER PROVIDER'S INSTRUCTIONS    PT(PATIENT) STATES SHE IS NOT VACCINATED    PT(PATIENT) STATES THE COUGH MEDICATION SHE WAS SCRIPTED IS NOT ENOUGH    PT(PATIENT) STATES SHE WOULD LIKE ANTIBIOTIC, PREFER PENICILLIN, STEROID, HYDROCLORIQUIN, NASICORT    PT(PATIENT) STATES SHE IS REALLY SICK AND NEEDS SOMETHING    PROVIDER PLEASE ADVISE

## 2022-01-21 NOTE — TELEPHONE ENCOUNTER
----- Message from Chito Hernandes MD sent at 1/21/2022  6:21 AM EST -----  PCR testing for COVID is positive.  Quarantine recs for Miss Gloria and her household per current CDC guidelines.    She will need to quarantine for at least 5 days, with day 1 being the first day of her symptoms.  If all symptoms are resolved, she can go out as early as day 6 if she wears a tight-fitting mask around others and in all public venues (for day 6-10).

## 2022-01-21 NOTE — PROGRESS NOTES
PCR testing for COVID is positive.  Quarantine recs for Miss Gloria and her household per current CDC guidelines.    She will need to quarantine for at least 5 days, with day 1 being the first day of her symptoms.  If all symptoms are resolved, she can go out as early as day 6 if she wears a tight-fitting mask around others and in all public venues (for day 6-10).

## 2022-01-21 NOTE — TELEPHONE ENCOUNTER
PT(PATIENT) RETURNED CALL TO OFFICE REQUESTING A DIFFERENT MEDICATION    PT(PATIENT) STATES THAT Triamcinolone Acetonide (Nasacort Allergy 24HR) 55 MCG/ACT nasal inhaler (01/21/2022)   IS NOT COVERED BY HER INSURANCE BECAUSE IT IS A OVER THE COUNTER MEDICATION    PT(PATIENT) STATES SHE CANNOT AFFORD THE MEDICATION COST OUT OF POCKET    PT(PATIENT) STATES THAT THE budesonide (PULMICORT) 180 MCG/ACT inhaler (01/21/2022) REQUIRES A PRIOR AUTH TO BE COVERED BY HER INSURANCE    PT(PATIENT) ADVISED THAT OFFICE WILL COMPLETE THE PA REQUEST AS QUICKLY AS POSSIBLE, BUT THAT IT WILL NEED TO RUN THRU PT(PATIENT)'S INSURANCE WHICH MAY TAKE SOME TIME    PT(PATIENT) VERBALIZED UNDERSTANDING

## 2022-01-21 NOTE — TELEPHONE ENCOUNTER
PT(PATIENT) RETURNED CALL TO OFFICE    PT VERIFIED     PT(PATIENT) IS REQUESTING A COPY OF HER RESULTS TO BE FAXED TO HER WORKPLACE    [employer]  FAX #[phone]

## 2022-01-21 NOTE — TELEPHONE ENCOUNTER
PATIENT CALLED BACK AND WOULD LIKE TO KNOW IF 800MG OF IBUPROFEN CAN BE SENT IN TO [facility] TO HELP WITH THE HEADACHES. SHE STATES THE 200MG SHE HAS IS NOT WORKING. I ASKED IF SHE HAD TRIED TAKING 800MG OF THE DOSE SHE HAD ON HAND AND SHE SAID NO SHE WANTS ONE PILL TO TAKE. PATIENT WAS UPSET STATING PROVIDERS WORK FOR PATIENTS AND SHE WANTED ME TO GET PROVIDER OUT OF A ROOM. I EXPLAINED THAT IS NOT APPROPRIATE AND HE IS SEEING PATIENTS IN OFFICE AND I WOULD SEND A MESSAGE AND IF THE PROVIDER FELT THIS WAS APPROPRIATE TO SEND IN HE WOULD AND WE WOULD LET HER KNOW.    PLEASE ADVISE?

## 2022-01-21 NOTE — TELEPHONE ENCOUNTER
Faxing the results to her workplace should be fine.    I'm happy to send a prescription for nasacort and budesonide.  She will need to wash her mouth after each use of budesonide to avoid thrush (she can rinse with water or mouthwash, or she can brush her teeth and tongue).  There is no evidence in favor of hydroxychloroquine, and I think the risks of retinopathy (I.e., risk to vision), and heart arrhythmias aren't worth the risk in this situation.  Viruses, unfortunately, are not susceptible to antibiotics.    Broadly speaking, there are 4 situations in which I would recommend that Miss Gloria present at the ED: if she is having trouble breathing, if she is able to keep any food or liquids down, if she is dehydrated (e.g., going more than 12 hours without going to the bathroom, or if her overall gestalt is that she is dangerously sick.  As we are going into the weekend, please remember that we always have a provider on call that can answer questions.  You can simply contact us via our usual scheduling number after hours, and the  can contact the on call physician or NP if you are having any problems after hours.

## 2022-01-26 ENCOUNTER — TELEPHONE (OUTPATIENT)
Dept: INTERNAL MEDICINE | Facility: CLINIC | Age: 57
End: 2022-01-26

## 2022-01-26 NOTE — TELEPHONE ENCOUNTER
"On Friday, January 21st, there were a series of incidents with Mrs Gloria and her , and I have asked our clinic manager to send a termination letter.    In speaking with Edie Balbuena, a member of our staff,  was shouting and belligerent over the phone.  On follow up phone call, the patient herself was quite insistent with clinical staff about getting pain medicine stronger than NSAIDs for treatment of COVID infection-related pain.  Per verbal report from Edie Balbuena, when Mrs. Gloria was asking for pain medicine she indicated she wanted medicine stronger than NSAIDs at that time.  On follow up phone call with our clinic manager, Mrs Gloria was noted to clarify her pain medicine request and instead wanted a prescription for high dose NSAIDs, but was also belligerent in communication with clinic manager.    During her phone communications, she also requested prescriptions for medicines that have no indication in treatment of covid (such as antibiotics).  In communication with clinic manager, I have noted the following \"PATIENT WAS UPSET STATING PROVIDERS WORK FOR PATIENTS AND SHE WANTED ME TO GET PROVIDER OUT OF A ROOM. I EXPLAINED THAT IS NOT APPROPRIATE AND HE IS SEEING PATIENTS IN OFFICE AND I WOULD SEND A MESSAGE AND IF THE PROVIDER FELT THIS WAS APPROPRIATE TO SEND IN HE WOULD AND WE WOULD LET HER KNOW.    I have asked her to send a termination letter to Mrs. Gloria given her and her 's inappropriate behavior with staff.  "

## 2022-01-28 ENCOUNTER — TELEPHONE (OUTPATIENT)
Dept: INTERNAL MEDICINE | Facility: CLINIC | Age: 57
End: 2022-01-28

## 2022-01-28 NOTE — TELEPHONE ENCOUNTER
PLEASE REFER TO THE FOLLOWING ENCOUNTERS  Telephone with Chito Hernandes MD (01/21/2022)  Telephone with Chito Hernandes MD (01/21/2022)    OK FOR HUB TO ADVISE/READ   DR HERNANDES WILL SEND IN ANOTHER INHALER THAT IS FORMULARY FOR THE PT(PATIENT) PER THE PT(PATIENT)'S MEDICAL INSURANCE COVERAGE    AS PREVIOUSLY DISCUSSED WITH THE PATIENT, AN ANTIBIOTIC IS NOT AN APPROPRIATE TREATMENT FOR A VIRUS, AS A VIRUS IS A DIFFERENT TYPE OF PATHOGEN THAN BACTERIA    THANK YOU

## 2022-01-28 NOTE — TELEPHONE ENCOUNTER
I have sent a prescription for flovent diskus.  Based on the list provided, it appears it doesn't need a PA

## 2022-01-28 NOTE — TELEPHONE ENCOUNTER
Caller: Gualberto Gloria    Relationship: Self    Best call back number: [phone]    What medication are you requesting: ANTIBIOTIC     What are your current symptoms: HEAD CONGESTION, COUGHING, DRAINAGE, GREEN MUCUS    How long have you been experiencing symptoms: A FEW DAYS    Have you had these symptoms before:    [x] Yes  [] No    Have you been treated for these symptoms before:   [x] Yes  [] No    If a prescription is needed, what is your preferred pharmacy and phone number:  [address] - [phone] [facility] [phone]   [phone]    Additional notes:

## 2022-06-03 ENCOUNTER — TRANSCRIBE ORDERS (OUTPATIENT)
Dept: GENERAL RADIOLOGY | Facility: HOSPITAL | Age: 57
End: 2022-06-03

## 2022-06-03 ENCOUNTER — HOSPITAL ENCOUNTER (OUTPATIENT)
Dept: GENERAL RADIOLOGY | Facility: HOSPITAL | Age: 57
Discharge: HOME | End: 2022-06-03

## 2022-06-03 DIAGNOSIS — M79.602 PAIN OF LEFT UPPER EXTREMITY: ICD-10-CM

## 2022-06-03 DIAGNOSIS — M79.602 PAIN OF LEFT UPPER EXTREMITY: Primary | ICD-10-CM

## 2022-06-03 PROCEDURE — 73030 X-RAY EXAM OF SHOULDER: CPT

## 2022-06-03 PROCEDURE — 73000 X-RAY EXAM OF COLLAR BONE: CPT

## 2022-11-16 ENCOUNTER — OFFICE VISIT (OUTPATIENT)
Dept: INTERNAL MEDICINE | Facility: CLINIC | Age: 57
End: 2022-11-16

## 2022-11-16 VITALS
DIASTOLIC BLOOD PRESSURE: 80 MMHG | HEIGHT: 64 IN | SYSTOLIC BLOOD PRESSURE: 122 MMHG | BODY MASS INDEX: 34.38 KG/M2 | TEMPERATURE: 97.5 F | WEIGHT: 201.4 LBS | HEART RATE: 74 BPM | OXYGEN SATURATION: 96 %

## 2022-11-16 DIAGNOSIS — F32.89 OTHER DEPRESSION: ICD-10-CM

## 2022-11-16 DIAGNOSIS — E03.8 OTHER SPECIFIED HYPOTHYROIDISM: ICD-10-CM

## 2022-11-16 DIAGNOSIS — I10 ESSENTIAL HYPERTENSION: ICD-10-CM

## 2022-11-16 DIAGNOSIS — R73.01 IFG (IMPAIRED FASTING GLUCOSE): Primary | ICD-10-CM

## 2022-11-16 DIAGNOSIS — E78.2 MIXED HYPERLIPIDEMIA: ICD-10-CM

## 2022-11-16 PROBLEM — F32.A DEPRESSION: Status: ACTIVE | Noted: 2022-11-16

## 2022-11-16 LAB
25(OH)D3 SERPL-MCNC: 45.3 NG/ML (ref 30–100)
ALBUMIN SERPL-MCNC: 4.5 G/DL (ref 3.5–5.2)
ALBUMIN/GLOB SERPL: 2 G/DL
ALP SERPL-CCNC: 81 U/L (ref 39–117)
ALT SERPL W P-5'-P-CCNC: 15 U/L (ref 1–33)
ANION GAP SERPL CALCULATED.3IONS-SCNC: 11 MMOL/L (ref 5–15)
AST SERPL-CCNC: 18 U/L (ref 1–32)
BASOPHILS # BLD AUTO: 0.03 10*3/MM3 (ref 0–0.2)
BASOPHILS NFR BLD AUTO: 0.6 % (ref 0–1.5)
BILIRUB SERPL-MCNC: 0.4 MG/DL (ref 0–1.2)
BUN SERPL-MCNC: 17 MG/DL (ref 6–20)
BUN/CREAT SERPL: 18.5 (ref 7–25)
CALCIUM SPEC-SCNC: 9.8 MG/DL (ref 8.6–10.5)
CHLORIDE SERPL-SCNC: 101 MMOL/L (ref 98–107)
CHOLEST SERPL-MCNC: 222 MG/DL (ref 0–200)
CO2 SERPL-SCNC: 26 MMOL/L (ref 22–29)
CREAT SERPL-MCNC: 0.92 MG/DL (ref 0.57–1)
DEPRECATED RDW RBC AUTO: 41.1 FL (ref 37–54)
EGFRCR SERPLBLD CKD-EPI 2021: 72.3 ML/MIN/1.73
EOSINOPHIL # BLD AUTO: 0.09 10*3/MM3 (ref 0–0.4)
EOSINOPHIL NFR BLD AUTO: 1.7 % (ref 0.3–6.2)
ERYTHROCYTE [DISTWIDTH] IN BLOOD BY AUTOMATED COUNT: 12.3 % (ref 12.3–15.4)
FOLATE SERPL-MCNC: 16.9 NG/ML (ref 4.78–24.2)
GLOBULIN UR ELPH-MCNC: 2.2 GM/DL
GLUCOSE SERPL-MCNC: 84 MG/DL (ref 65–99)
HBA1C MFR BLD: 6 % (ref 4.8–5.6)
HCT VFR BLD AUTO: 38.6 % (ref 34–46.6)
HDLC SERPL-MCNC: 70 MG/DL (ref 40–60)
HGB BLD-MCNC: 13.2 G/DL (ref 12–15.9)
IMM GRANULOCYTES # BLD AUTO: 0.02 10*3/MM3 (ref 0–0.05)
IMM GRANULOCYTES NFR BLD AUTO: 0.4 % (ref 0–0.5)
LDLC SERPL CALC-MCNC: 135 MG/DL (ref 0–100)
LDLC/HDLC SERPL: 1.9 {RATIO}
LYMPHOCYTES # BLD AUTO: 1.81 10*3/MM3 (ref 0.7–3.1)
LYMPHOCYTES NFR BLD AUTO: 34.2 % (ref 19.6–45.3)
MCH RBC QN AUTO: 31.3 PG (ref 26.6–33)
MCHC RBC AUTO-ENTMCNC: 34.2 G/DL (ref 31.5–35.7)
MCV RBC AUTO: 91.5 FL (ref 79–97)
MONOCYTES # BLD AUTO: 0.44 10*3/MM3 (ref 0.1–0.9)
MONOCYTES NFR BLD AUTO: 8.3 % (ref 5–12)
NEUTROPHILS NFR BLD AUTO: 2.9 10*3/MM3 (ref 1.7–7)
NEUTROPHILS NFR BLD AUTO: 54.8 % (ref 42.7–76)
NRBC BLD AUTO-RTO: 0 /100 WBC (ref 0–0.2)
PLATELET # BLD AUTO: 249 10*3/MM3 (ref 140–450)
PMV BLD AUTO: 9.3 FL (ref 6–12)
POTASSIUM SERPL-SCNC: 4.9 MMOL/L (ref 3.5–5.2)
PROT SERPL-MCNC: 6.7 G/DL (ref 6–8.5)
RBC # BLD AUTO: 4.22 10*6/MM3 (ref 3.77–5.28)
SODIUM SERPL-SCNC: 138 MMOL/L (ref 136–145)
T4 FREE SERPL-MCNC: 1.29 NG/DL (ref 0.93–1.7)
TRIGL SERPL-MCNC: 96 MG/DL (ref 0–150)
TSH SERPL DL<=0.05 MIU/L-ACNC: 3.12 UIU/ML (ref 0.27–4.2)
VIT B12 BLD-MCNC: 262 PG/ML (ref 211–946)
VLDLC SERPL-MCNC: 17 MG/DL (ref 5–40)
WBC NRBC COR # BLD: 5.29 10*3/MM3 (ref 3.4–10.8)

## 2022-11-16 PROCEDURE — 80061 LIPID PANEL: CPT | Performed by: INTERNAL MEDICINE

## 2022-11-16 PROCEDURE — 84439 ASSAY OF FREE THYROXINE: CPT | Performed by: INTERNAL MEDICINE

## 2022-11-16 PROCEDURE — 80050 GENERAL HEALTH PANEL: CPT | Performed by: INTERNAL MEDICINE

## 2022-11-16 PROCEDURE — 82607 VITAMIN B-12: CPT | Performed by: INTERNAL MEDICINE

## 2022-11-16 PROCEDURE — 82306 VITAMIN D 25 HYDROXY: CPT | Performed by: INTERNAL MEDICINE

## 2022-11-16 PROCEDURE — 99204 OFFICE O/P NEW MOD 45 MIN: CPT | Performed by: INTERNAL MEDICINE

## 2022-11-16 PROCEDURE — 83036 HEMOGLOBIN GLYCOSYLATED A1C: CPT | Performed by: INTERNAL MEDICINE

## 2022-11-16 PROCEDURE — 82746 ASSAY OF FOLIC ACID SERUM: CPT | Performed by: INTERNAL MEDICINE

## 2022-11-16 PROCEDURE — 36415 COLL VENOUS BLD VENIPUNCTURE: CPT | Performed by: INTERNAL MEDICINE

## 2022-11-16 RX ORDER — LEVOTHYROXINE SODIUM 0.03 MG/1
25 TABLET ORAL DAILY
Qty: 90 TABLET | Refills: 2 | Status: SHIPPED | OUTPATIENT
Start: 2022-11-16

## 2022-11-16 RX ORDER — LISINOPRIL AND HYDROCHLOROTHIAZIDE 20; 12.5 MG/1; MG/1
1 TABLET ORAL DAILY
Qty: 90 TABLET | Refills: 3 | Status: SHIPPED | OUTPATIENT
Start: 2022-11-16

## 2022-11-16 RX ORDER — OMEPRAZOLE 40 MG/1
40 CAPSULE, DELAYED RELEASE ORAL DAILY
Qty: 90 CAPSULE | Refills: 3 | Status: SHIPPED | OUTPATIENT
Start: 2022-11-16

## 2022-11-16 RX ORDER — PAROXETINE HYDROCHLORIDE 40 MG/1
40 TABLET, FILM COATED ORAL EVERY MORNING
Qty: 90 TABLET | Refills: 3 | Status: SHIPPED | OUTPATIENT
Start: 2022-11-16

## 2022-11-16 RX ORDER — ATORVASTATIN CALCIUM 20 MG/1
20 TABLET, FILM COATED ORAL DAILY
Qty: 90 TABLET | Refills: 3 | Status: SHIPPED | OUTPATIENT
Start: 2022-11-16 | End: 2023-03-29 | Stop reason: SDUPTHER

## 2022-11-16 NOTE — PROGRESS NOTES
"Chief Complaint/ HPI: --- Patient is here to establish as a new patient says she has some arthritis, she also has a nodule on her lung that is been followed in the past, she is just changing position she needs refills of her medications,          Objective   Vital Signs  Vitals:    11/16/22 0929   BP: 122/80   Pulse: 74   Temp: 97.5 °F (36.4 °C)   SpO2: 96%   Weight: 91.4 kg (201 lb 6.4 oz)   Height: 162.6 cm (64.02\")      Body mass index is 34.55 kg/m².  Review of Systems   Constitutional: Negative.    HENT: Negative.    Eyes: Negative.    Respiratory: Negative.    Cardiovascular: Negative.    Gastrointestinal: Negative.    Endocrine: Negative.    Genitourinary: Negative.    Musculoskeletal: Negative.    Allergic/Immunologic: Negative.    Neurological: Negative.    Hematological: Negative.    Psychiatric/Behavioral: Negative.       Physical Exam  Constitutional:       General: She is not in acute distress.     Appearance: Normal appearance.   HENT:      Head: Normocephalic and atraumatic.      Right Ear: Tympanic membrane and ear canal normal.      Left Ear: Tympanic membrane and ear canal normal.      Mouth/Throat:      Mouth: Mucous membranes are moist.      Pharynx: Oropharynx is clear.   Eyes:      General: No scleral icterus.     Extraocular Movements: Extraocular movements intact.      Conjunctiva/sclera: Conjunctivae normal.      Pupils: Pupils are equal, round, and reactive to light.   Neck:      Vascular: No carotid bruit.   Cardiovascular:      Rate and Rhythm: Normal rate and regular rhythm.      Pulses: Normal pulses.      Heart sounds: Normal heart sounds. No murmur heard.    No gallop.   Pulmonary:      Effort: Pulmonary effort is normal. No respiratory distress.      Breath sounds: Normal breath sounds. No wheezing.   Abdominal:      General: Bowel sounds are normal. There is no distension.      Palpations: Abdomen is soft. There is no mass.      Tenderness: There is no abdominal tenderness. There is " no guarding.   Musculoskeletal:         General: No swelling or tenderness. Normal range of motion.      Cervical back: Normal range of motion and neck supple. No tenderness.      Right lower leg: No edema.      Left lower leg: No edema.   Lymphadenopathy:      Cervical: No cervical adenopathy.   Skin:     General: Skin is warm and dry.      Coloration: Skin is not jaundiced.      Findings: No rash.   Neurological:      General: No focal deficit present.      Mental Status: She is alert and oriented to person, place, and time.      Motor: No weakness.      Coordination: Coordination normal.      Gait: Gait normal.   Psychiatric:         Mood and Affect: Mood normal.         Behavior: Behavior normal.         Thought Content: Thought content normal.         Judgment: Judgment normal.        Result Review :   No results found for: PROBNP, BNP  CMP    CMP 12/2/21   Glucose 75   BUN 15   Creatinine 0.76   eGFR Non African Am 78   Sodium 137   Potassium 4.9   Chloride 100   Calcium 9.6   Albumin 4.60   Total Bilirubin 0.4   Alkaline Phosphatase 91   AST (SGOT) 27   ALT (SGPT) 25           CBC w/diff    CBC w/Diff 12/2/21   WBC 5.14   RBC 4.46   Hemoglobin 13.9   Hematocrit 41.4   MCV 92.8   MCH 31.2   MCHC 33.6   RDW 12.2 (A)   Platelets 254   Neutrophil Rel % 55.3   Immature Granulocyte Rel % 0.4   Lymphocyte Rel % 33.3   Monocyte Rel % 8.4   Eosinophil Rel % 1.6   Basophil Rel % 1.0   (A) Abnormal value             Lipid Panel    Lipid Panel 12/2/21   Total Cholesterol 230 (A)   Triglycerides 121   HDL Cholesterol 67 (A)   VLDL Cholesterol 21   LDL Cholesterol  142 (A)   LDL/HDL Ratio 2.07   (A) Abnormal value             Lab Results   Component Value Date    TSH 2.330 12/02/2021    TSH 3.440 04/24/2019      Lab Results   Component Value Date    FREET4 1.34 12/02/2021    FREET4 1.4 04/24/2019      A1C Last 3 Results    HGBA1C Last 3 Results 12/2/21   Hemoglobin A1C 6.10 (A)   (A) Abnormal value                               Visit Diagnoses:    ICD-10-CM ICD-9-CM   1. IFG (impaired fasting glucose)  R73.01 790.21   2. Essential hypertension  I10 401.9   3. Mixed hyperlipidemia  E78.2 272.2   4. Other depression  F32.89 311   5. Other specified hypothyroidism  E03.8 244.8       Assessment and Plan   Diagnoses and all orders for this visit:    1. IFG (impaired fasting glucose) (Primary)  -     CBC & Differential; Future  -     Comprehensive Metabolic Panel; Future  -     Lipid Panel; Future  -     TSH+Free T4; Future  -     Vitamin B12 anemia; Future  -     Folate anemia; Future  -     Vitamin D,25-Hydroxy; Future  -     Hemoglobin A1c; Future  -     CT Chest Without Contrast Diagnostic; Future    2. Essential hypertension  -     lisinopril-hydrochlorothiazide (PRINZIDE,ZESTORETIC) 20-12.5 MG per tablet; Take 1 tablet by mouth Daily.  Dispense: 90 tablet; Refill: 3  -     CBC & Differential; Future  -     Comprehensive Metabolic Panel; Future  -     Lipid Panel; Future  -     TSH+Free T4; Future  -     Vitamin B12 anemia; Future  -     Folate anemia; Future  -     Vitamin D,25-Hydroxy; Future  -     Hemoglobin A1c; Future  -     CT Chest Without Contrast Diagnostic; Future    3. Mixed hyperlipidemia  -     atorvastatin (Lipitor) 20 MG tablet; Take 1 tablet by mouth Daily.  Dispense: 90 tablet; Refill: 3  -     CBC & Differential; Future  -     Comprehensive Metabolic Panel; Future  -     Lipid Panel; Future  -     TSH+Free T4; Future  -     Vitamin B12 anemia; Future  -     Folate anemia; Future  -     Vitamin D,25-Hydroxy; Future  -     Hemoglobin A1c; Future  -     CT Chest Without Contrast Diagnostic; Future    4. Other depression  -     PARoxetine (PAXIL) 40 MG tablet; Take 1 tablet by mouth Every Morning.  Dispense: 90 tablet; Refill: 3  -     CBC & Differential; Future  -     Comprehensive Metabolic Panel; Future  -     Lipid Panel; Future  -     TSH+Free T4; Future  -     Vitamin B12 anemia; Future  -     Folate anemia;  Future  -     Vitamin D,25-Hydroxy; Future  -     Hemoglobin A1c; Future  -     CT Chest Without Contrast Diagnostic; Future    5. Other specified hypothyroidism  -     levothyroxine (SYNTHROID, LEVOTHROID) 25 MCG tablet; Take 1 tablet by mouth Daily.  Dispense: 90 tablet; Refill: 2  -     CBC & Differential; Future  -     Comprehensive Metabolic Panel; Future  -     Lipid Panel; Future  -     TSH+Free T4; Future  -     Vitamin B12 anemia; Future  -     Folate anemia; Future  -     Vitamin D,25-Hydroxy; Future  -     Hemoglobin A1c; Future  -     CT Chest Without Contrast Diagnostic; Future    Other orders  -     omeprazole (priLOSEC) 40 MG capsule; Take 1 capsule by mouth Daily.  Dispense: 90 capsule; Refill: 3    Mixed hyperlipidemia continues Lipitor 20 mg daily    Hypothyroidism continues levothyroxine 25 mcg daily    Gastroesophageal reflux disease continues omeprazole 40 mg daily    Hypertension continues lisinopril HCTZ 20-12.5 mg daily,    Pulmonary nodule, 3 mm left lower lobe last CAT scan December 23, 2021, consider follow-up again if no changes would probably not do any further scans, discussed with patient-----we will do a follow-up CT scan December 2022 prior to her next visit with me given the history of smoking,    Coronary artery calcifications on CT scan discussed with patient, November 2022, risk factor reduction etc. discussed exercise etc.    Tobacco use,--smoked total of 37 years, quit about 5 years ago, gained some weight,        Follow Up   Return in about 4 weeks (around 12/14/2022).  Patient was given instructions and counseling regarding her condition or for health maintenance advice. Please see specific information pulled into the AVS if appropriate.

## 2022-11-18 ENCOUNTER — PATIENT ROUNDING (BHMG ONLY) (OUTPATIENT)
Dept: INTERNAL MEDICINE | Facility: CLINIC | Age: 57
End: 2022-11-18

## 2022-11-18 NOTE — PROGRESS NOTES
November 18, 2022    Hello, may I speak with Gualberto Gloria?    My name is Jg      I am  with [facility]  [facility]  [address] [address]  [place] [address].    Before we get started may I verify your date of birth? 1965    I am calling to officially welcome you to our practice and ask about your recent visit. Is this a good time to talk? yes    Tell me about your visit with us. What things went well?  patient said that the visit went good        We're always looking for ways to make our patients' experiences even better. Do you have recommendations on ways we may improve?  no    Overall were you satisfied with your first visit to our practice? yes       I appreciate you taking the time to speak with me today. Is there anything else I can do for you? no      Thank you, and have a great day.

## 2022-12-13 ENCOUNTER — HOSPITAL ENCOUNTER (OUTPATIENT)
Dept: CT IMAGING | Facility: HOSPITAL | Age: 57
Discharge: HOME | End: 2022-12-13
Admitting: INTERNAL MEDICINE

## 2022-12-13 DIAGNOSIS — R73.01 IFG (IMPAIRED FASTING GLUCOSE): ICD-10-CM

## 2022-12-13 DIAGNOSIS — E78.2 MIXED HYPERLIPIDEMIA: ICD-10-CM

## 2022-12-13 DIAGNOSIS — E03.8 OTHER SPECIFIED HYPOTHYROIDISM: ICD-10-CM

## 2022-12-13 DIAGNOSIS — I10 ESSENTIAL HYPERTENSION: ICD-10-CM

## 2022-12-13 DIAGNOSIS — F32.89 OTHER DEPRESSION: ICD-10-CM

## 2022-12-13 PROCEDURE — 71250 CT THORAX DX C-: CPT

## 2022-12-16 ENCOUNTER — OFFICE VISIT (OUTPATIENT)
Dept: INTERNAL MEDICINE | Facility: CLINIC | Age: 57
End: 2022-12-16

## 2022-12-16 VITALS
HEART RATE: 77 BPM | WEIGHT: 196.6 LBS | BODY MASS INDEX: 33.57 KG/M2 | TEMPERATURE: 97.2 F | DIASTOLIC BLOOD PRESSURE: 84 MMHG | OXYGEN SATURATION: 95 % | SYSTOLIC BLOOD PRESSURE: 128 MMHG | HEIGHT: 64 IN

## 2022-12-16 DIAGNOSIS — R73.01 IFG (IMPAIRED FASTING GLUCOSE): ICD-10-CM

## 2022-12-16 DIAGNOSIS — I10 ESSENTIAL HYPERTENSION: ICD-10-CM

## 2022-12-16 DIAGNOSIS — E53.8 VITAMIN B12 DEFICIENCY: Primary | ICD-10-CM

## 2022-12-16 DIAGNOSIS — E78.2 MIXED HYPERLIPIDEMIA: ICD-10-CM

## 2022-12-16 DIAGNOSIS — F32.89 OTHER DEPRESSION: ICD-10-CM

## 2022-12-16 DIAGNOSIS — N18.31 STAGE 3A CHRONIC KIDNEY DISEASE: ICD-10-CM

## 2022-12-16 PROCEDURE — 99214 OFFICE O/P EST MOD 30 MIN: CPT | Performed by: INTERNAL MEDICINE

## 2022-12-16 RX ORDER — ROSUVASTATIN CALCIUM 20 MG/1
20 TABLET, COATED ORAL DAILY
Qty: 90 TABLET | Refills: 3 | Status: SHIPPED | OUTPATIENT
Start: 2022-12-16

## 2022-12-16 NOTE — PROGRESS NOTES
"CHIEF COMPLAINT:  Results (Patient here to follow up on blood work and ct scan ) and Follow-up      HPI:    Patient is here to establish as a new patient says she has some arthritis, she also has a nodule on her lung that is been followed in the past, she is just changing position she needs refills of her medications,        Objective   Vital Signs  Vitals:    12/16/22 0926   BP: 128/84   Pulse: 77   Temp: 97.2 °F (36.2 °C)   SpO2: 95%   Weight: 89.2 kg (196 lb 9.6 oz)   Height: 162.6 cm (64.02\")      Body mass index is 33.73 kg/m².  Review of Systems   Physical Exam   Result Review :   No results found for: PROBNP, BNP  CMP    CMP 11/16/22   Glucose 84   BUN 17   Creatinine 0.92   Sodium 138   Potassium 4.9   Chloride 101   Calcium 9.8   Albumin 4.50   Total Bilirubin 0.4   Alkaline Phosphatase 81   AST (SGOT) 18   ALT (SGPT) 15           CBC w/diff    CBC w/Diff 11/16/22   WBC 5.29   RBC 4.22   Hemoglobin 13.2   Hematocrit 38.6   MCV 91.5   MCH 31.3   MCHC 34.2   RDW 12.3   Platelets 249   Neutrophil Rel % 54.8   Immature Granulocyte Rel % 0.4   Lymphocyte Rel % 34.2   Monocyte Rel % 8.3   Eosinophil Rel % 1.7   Basophil Rel % 0.6            Lipid Panel    Lipid Panel 11/16/22   Total Cholesterol 222 (A)   Triglycerides 96   HDL Cholesterol 70 (A)   VLDL Cholesterol 17   LDL Cholesterol  135 (A)   LDL/HDL Ratio 1.90   (A) Abnormal value             Lab Results   Component Value Date    TSH 3.120 11/16/2022    TSH 2.330 12/02/2021    TSH 3.440 04/24/2019      Lab Results   Component Value Date    FREET4 1.29 11/16/2022    FREET4 1.34 12/02/2021    FREET4 1.4 04/24/2019      A1C Last 3 Results    HGBA1C Last 3 Results 11/16/22   Hemoglobin A1C 6.00 (A)   (A) Abnormal value                              Visit Diagnoses:    ICD-10-CM ICD-9-CM   1. Vitamin B12 deficiency  E53.8 266.2   2. IFG (impaired fasting glucose)  R73.01 790.21   3. Stage 3a chronic kidney disease (HCC)  N18.31 585.3   4. Essential hypertension  " I10 401.9   5. Mixed hyperlipidemia  E78.2 272.2   6. Other depression  F32.89 311       Assessment and Plan   Diagnoses and all orders for this visit:    1. Vitamin B12 deficiency (Primary)  -     CBC & Differential; Future  -     Comprehensive Metabolic Panel; Future  -     Hemoglobin A1c; Future  -     Lipid Panel; Future  -     Vitamin B12 anemia; Future  -     Folate anemia; Future    2. IFG (impaired fasting glucose)  -     CBC & Differential; Future  -     Comprehensive Metabolic Panel; Future  -     Hemoglobin A1c; Future  -     Lipid Panel; Future  -     Vitamin B12 anemia; Future  -     Folate anemia; Future    3. Stage 3a chronic kidney disease (HCC)  -     CBC & Differential; Future  -     Comprehensive Metabolic Panel; Future  -     Hemoglobin A1c; Future  -     Lipid Panel; Future  -     Vitamin B12 anemia; Future  -     Folate anemia; Future    4. Essential hypertension  -     CBC & Differential; Future  -     Comprehensive Metabolic Panel; Future  -     Hemoglobin A1c; Future  -     Lipid Panel; Future  -     Vitamin B12 anemia; Future  -     Folate anemia; Future    5. Mixed hyperlipidemia  -     CBC & Differential; Future  -     Comprehensive Metabolic Panel; Future  -     Hemoglobin A1c; Future  -     Lipid Panel; Future  -     Vitamin B12 anemia; Future  -     Folate anemia; Future    6. Other depression  -     CBC & Differential; Future  -     Comprehensive Metabolic Panel; Future  -     Hemoglobin A1c; Future  -     Lipid Panel; Future  -     Vitamin B12 anemia; Future  -     Folate anemia; Future    Other orders  -     rosuvastatin (Crestor) 20 MG tablet; Take 1 tablet by mouth Daily.  Dispense: 90 tablet; Refill: 3        Mixed hyperlipidemia continues Lipitor 20 mg daily, still running a little bit high, December 2022 we will switch to Crestor 20 mg daily,    Coronary artery calcifications on recent CT scan discussed cardiac risks atherosclerosis etc. changing statins continue, December 16,  2022 encourage patient to eat healthy, walk every day monitor her blood pressure, discussed December 16, 2022    Hypothyroidism continues levothyroxine 25 mcg daily    Impaired fasting glucose hemoglobin A1c at 6.0    Gastroesophageal reflux disease continues omeprazole 40 mg daily    Hypertension continues lisinopril HCTZ 20-12.5 mg daily,    Pulmonary nodule, 3 mm left lower lobe---- last CAT scan December 23, 2021, --- CT scan December 14, 2022 showed coronary artery calcifications finding consistent with prior granulomatous disease no new or enlarging pulmonary nodules--- the previously identified 3 mm left lower lobe nodule was not identified December 2022    Vitamin B12 at 262 --dec 2022 ---recommend over-the-counter B12 supplements daily    Coronary artery calcifications on CT scan discussed with patient, November 2022, risk factor reduction etc. discussed exercise etc.    Tobacco use,--smoked total of 37 years, quit about 5 years ago, gained some weight,    Follow Up   Return in about 6 months (around 6/16/2023).  Patient was given instructions and counseling regarding her condition or for health maintenance advice. Please see specific information pulled into the AVS if appropriate.

## 2023-03-27 ENCOUNTER — TELEPHONE (OUTPATIENT)
Dept: INTERNAL MEDICINE | Facility: CLINIC | Age: 58
End: 2023-03-27
Payer: COMMERCIAL

## 2023-03-27 NOTE — TELEPHONE ENCOUNTER
Tell her to stop the medicine completely to see if the symptoms go away and follow-up with me in June and we will discuss another treatment option at that time but I do not want to give her anything new because it may do the same thing right now

## 2023-03-27 NOTE — TELEPHONE ENCOUNTER
Caller: Gualberto Gloria    Relationship to patient: Self    Best call back number: [phone]    Patient is needing: PATIENT STATED THAT THE NEW CHOLESTEROL MEDICATION HAS HER CONSTIPATED. SHE TAKES MEDICINE AND THAN SHE WILL GET DIARRHEA. PATIENT WANTS TO KNOW WHAT LESE SHE CAN DO PLEASE CALL AND ADVISE.

## 2023-03-29 ENCOUNTER — TELEPHONE (OUTPATIENT)
Dept: INTERNAL MEDICINE | Facility: CLINIC | Age: 58
End: 2023-03-29
Payer: COMMERCIAL

## 2023-03-29 DIAGNOSIS — E78.2 MIXED HYPERLIPIDEMIA: ICD-10-CM

## 2023-03-29 RX ORDER — ATORVASTATIN CALCIUM 20 MG/1
20 TABLET, FILM COATED ORAL DAILY
Qty: 90 TABLET | Refills: 3 | Status: SHIPPED | OUTPATIENT
Start: 2023-03-29

## 2023-03-29 NOTE — TELEPHONE ENCOUNTER
Caller: Faizan Gualberto GERARDO    Relationship: Self    Best call back number: [phone]    Requested Prescriptions:   Requested Prescriptions     Pending Prescriptions Disp Refills   • atorvastatin (Lipitor) 20 MG tablet 90 tablet 3     Sig: Take 1 tablet by mouth Daily.        Pharmacy where request should be sent: [facility] [address] [place] [address] [phone] [facility] [phone] [facility]     Last office visit with prescribing clinician: 12/16/2022   Last telemedicine visit with prescribing clinician: 6/16/2023   Next office visit with prescribing clinician: 6/16/2023     Additional details provided by patient: PATIENT IS ASKING IF SHE CAN BE BACK ON THIS UNTIL HER APPOINTMENT IN June.     PATIENT STATES SHE WAS TAKEN OFF THE NEW MEDICATION BECAUSE IT CONSTIPATED HER BUT SHE DOES NOT WANT TO BE OFF OF MEDS FOR MORE THAN 2 MONTHS AND THINKS SHE SHOULD BE BACK ON HER OLD MEDICATION.    Does the patient have less than a 3 day supply:  [] Yes  [] No    Would you like a call back once the refill request has been completed: [x] Yes [] No    If the office needs to give you a call back, can they leave a voicemail: [x] Yes [] No    Uma Bonner, PCT   03/29/23 11:21 EDT

## 2023-05-05 ENCOUNTER — TELEPHONE (OUTPATIENT)
Dept: INTERNAL MEDICINE | Facility: CLINIC | Age: 58
End: 2023-05-05

## 2023-05-05 ENCOUNTER — CLINICAL SUPPORT (OUTPATIENT)
Dept: INTERNAL MEDICINE | Facility: CLINIC | Age: 58
End: 2023-05-05
Payer: COMMERCIAL

## 2023-05-05 DIAGNOSIS — M25.50 ARTHRALGIA, UNSPECIFIED JOINT: Primary | ICD-10-CM

## 2023-05-05 RX ORDER — METHYLPREDNISOLONE ACETATE 40 MG/ML
40 INJECTION, SUSPENSION INTRA-ARTICULAR; INTRALESIONAL; INTRAMUSCULAR; SOFT TISSUE ONCE
Status: COMPLETED | OUTPATIENT
Start: 2023-05-05 | End: 2023-05-05

## 2023-05-05 RX ADMIN — METHYLPREDNISOLONE ACETATE 40 MG: 40 INJECTION, SUSPENSION INTRA-ARTICULAR; INTRALESIONAL; INTRAMUSCULAR; SOFT TISSUE at 11:53

## 2023-05-05 NOTE — TELEPHONE ENCOUNTER
Patient states her arthritis is really bad this morning and would like to know if she can come in to get a steroid shot? She said shes tried taking the arthritis strength tylenol and it is not working.

## 2023-05-09 ENCOUNTER — OFFICE VISIT (OUTPATIENT)
Dept: INTERNAL MEDICINE | Facility: CLINIC | Age: 58
End: 2023-05-09
Payer: COMMERCIAL

## 2023-05-09 VITALS
WEIGHT: 191.2 LBS | SYSTOLIC BLOOD PRESSURE: 98 MMHG | HEIGHT: 64 IN | DIASTOLIC BLOOD PRESSURE: 60 MMHG | HEART RATE: 87 BPM | BODY MASS INDEX: 32.64 KG/M2 | OXYGEN SATURATION: 98 % | TEMPERATURE: 97.5 F

## 2023-05-09 DIAGNOSIS — B00.9 HSV-1 INFECTION: ICD-10-CM

## 2023-05-09 DIAGNOSIS — R10.11 ACUTE ABDOMINAL PAIN IN RIGHT UPPER QUADRANT: Primary | ICD-10-CM

## 2023-05-09 PROCEDURE — 99214 OFFICE O/P EST MOD 30 MIN: CPT | Performed by: NURSE PRACTITIONER

## 2023-05-09 RX ORDER — VALACYCLOVIR HYDROCHLORIDE 500 MG/1
500 TABLET, FILM COATED ORAL 2 TIMES DAILY
Qty: 60 TABLET | Refills: 2 | Status: SHIPPED | OUTPATIENT
Start: 2023-05-09

## 2023-05-09 RX ORDER — HYDROCODONE BITARTRATE AND ACETAMINOPHEN 5; 325 MG/1; MG/1
TABLET ORAL
COMMUNITY
Start: 2023-03-10

## 2023-05-09 NOTE — PROGRESS NOTES
"Chief Complaint  Abdominal Pain (The patient states she is having an \"uncomfortable\" pain in her right side. She states her large intestines are not moving. This started back in march. )  Subjective      History of Present Illness  Gualberto Gloria is a 57 y.o. female with underlying hyperlipidemia, coronary artery calcifications on CT, hypertension, chronic kidney disease, prediabetes, hypothyroidism, reflux among others presents to [facility] for an acute visit for complaint of intermittent abdominal pain right upper quadrant for 2 months. The pain is described as uncomfortable and at times sharp and is worse in the mornings. It is not associated with eating. Laying down on the right side makes it feel worse. She has been constipated for 2 months. When she takes Miramax she has bowel movements. She states the bowel movements are small.   Patient is all so complaining of fever blister outbreak.  She has had this before and used Valtrex.    Review of Systems  Constitutional: Negative for loss of appetite, fever and chills.    Cardiovascular: Negative for chest pain.  Respiratory: Negative for dyspnea.  Gastrointestinal: Negative for nausea, vomiting and diarrhea.   Genitourinary: Negative for dysuria.  Neurologic: Negative for headaches and dizziness.     Past Medical History:   Diagnosis Date   • COPD (chronic obstructive pulmonary disease)    • Hyperlipidemia    • Hypertension         Past Surgical History:   Procedure Laterality Date   • BACK SURGERY     • COLONOSCOPY     • TUBAL ABDOMINAL LIGATION          Allergies   Allergen Reactions   • Codeine Nausea Only          Current Outpatient Medications:   •  aspirin 81 MG EC tablet, Take 1 tablet by mouth Every Other Day., Disp: 90 tablet, Rfl: 2  •  atorvastatin (Lipitor) 20 MG tablet, Take 1 tablet by mouth Daily., Disp: 90 tablet, Rfl: 3  •  Cholecalciferol (Vitamin D3) 50 MCG (2000 UT) tablet, Take 1 tablet by mouth Every " "Other Day., Disp: 30 tablet, Rfl: 2  •  ibuprofen (ADVIL,MOTRIN) 800 MG tablet, Take 1 tablet by mouth Every 6 (Six) Hours As Needed for Mild Pain , Fever or Headache., Disp: 30 tablet, Rfl: 0  •  levothyroxine (SYNTHROID, LEVOTHROID) 25 MCG tablet, Take 1 tablet by mouth Daily., Disp: 90 tablet, Rfl: 2  •  lisinopril-hydrochlorothiazide (PRINZIDE,ZESTORETIC) 20-12.5 MG per tablet, Take 1 tablet by mouth Daily., Disp: 90 tablet, Rfl: 3  •  omeprazole (priLOSEC) 40 MG capsule, Take 1 capsule by mouth Daily., Disp: 90 capsule, Rfl: 3  •  PARoxetine (PAXIL) 40 MG tablet, Take 1 tablet by mouth Every Morning., Disp: 90 tablet, Rfl: 3  •  rosuvastatin (Crestor) 20 MG tablet, Take 1 tablet by mouth Daily., Disp: 90 tablet, Rfl: 3  •  HYDROcodone-acetaminophen (NORCO) 5-325 MG per tablet, take 1 tablet by mouth every 4 to 6 hours as needed (Patient not taking: Reported on 5/9/2023), Disp: , Rfl:   •  valACYclovir (Valtrex) 500 MG tablet, Take 1 tablet by mouth 2 (Two) Times a Day., Disp: 60 tablet, Rfl: 2    Objective   BP 98/60 (BP Location: Right arm, Patient Position: Sitting, Cuff Size: Large Adult)   Pulse 87   Temp 97.5 °F (36.4 °C) (Temporal)   Ht 162.6 cm (64.02\")   Wt 86.7 kg (191 lb 3.2 oz)   SpO2 98%   BMI 32.80 kg/m²    Estimated body mass index is 32.8 kg/m² as calculated from the following:    Height as of this encounter: 162.6 cm (64.02\").    Weight as of this encounter: 86.7 kg (191 lb 3.2 oz).   Physical Exam  Vitals reviewed.   Constitutional:       General: She is not in acute distress.  HENT:      Head: Normocephalic and atraumatic.      Mouth/Throat:      Lips: Lesions present.      Mouth: Mucous membranes are moist.   Pulmonary:      Effort: Pulmonary effort is normal.   Abdominal:      General: There is no distension.      Palpations: Abdomen is soft. There is no mass.      Tenderness: There is no abdominal tenderness. There is no right CVA tenderness or left CVA tenderness.   Skin:     " General: Skin is warm and dry.   Neurological:      General: No focal deficit present.      Mental Status: She is alert.      Motor: No weakness.   Psychiatric:         Thought Content: Thought content normal.               Assessment and Plan   Diagnoses and all orders for this visit:    1. Acute abdominal pain in right upper quadrant (Primary)  -     US Gallbladder; Future  -     NM Hepatobiliary Without CCK; Future    2. HSV-1 infection    Other orders  -     valACYclovir (Valtrex) 500 MG tablet; Take 1 tablet by mouth 2 (Two) Times a Day.  Dispense: 60 tablet; Refill: 2      Differential diagnosis discussed and include gallbladder disease, liver disease, pancreatic disease, stones, etc.  Ultrasound of the gallbladder and then HIDA scan recommended.  Follow-up post test.  For worsening of abdominal pain or other symptoms such as fever, vomiting, etc. patient was advised to call 911 or present to the emergency department.    Patient was given instructions and counseling regarding her condition. Please see specific information pulled into the AVS if appropriate.     Follow Up   Return for Follow-up post test.    ANA LAURA Zacarias

## 2023-06-01 ENCOUNTER — LAB (OUTPATIENT)
Dept: INTERNAL MEDICINE | Facility: CLINIC | Age: 58
End: 2023-06-01

## 2023-06-01 DIAGNOSIS — E53.8 VITAMIN B12 DEFICIENCY: ICD-10-CM

## 2023-06-01 DIAGNOSIS — I10 ESSENTIAL HYPERTENSION: ICD-10-CM

## 2023-06-01 DIAGNOSIS — N18.31 STAGE 3A CHRONIC KIDNEY DISEASE: ICD-10-CM

## 2023-06-01 DIAGNOSIS — F32.89 OTHER DEPRESSION: ICD-10-CM

## 2023-06-01 DIAGNOSIS — R73.01 IFG (IMPAIRED FASTING GLUCOSE): ICD-10-CM

## 2023-06-01 DIAGNOSIS — E78.2 MIXED HYPERLIPIDEMIA: ICD-10-CM

## 2023-06-01 LAB
ALBUMIN SERPL-MCNC: 4.2 G/DL (ref 3.5–5.2)
ALBUMIN/GLOB SERPL: 1.8 G/DL
ALP SERPL-CCNC: 96 U/L (ref 39–117)
ALT SERPL W P-5'-P-CCNC: 18 U/L (ref 1–33)
ANION GAP SERPL CALCULATED.3IONS-SCNC: 10.7 MMOL/L (ref 5–15)
AST SERPL-CCNC: 20 U/L (ref 1–32)
BASOPHILS # BLD AUTO: 0.04 10*3/MM3 (ref 0–0.2)
BASOPHILS NFR BLD AUTO: 0.8 % (ref 0–1.5)
BILIRUB SERPL-MCNC: 0.4 MG/DL (ref 0–1.2)
BUN SERPL-MCNC: 18 MG/DL (ref 6–20)
BUN/CREAT SERPL: 19.8 (ref 7–25)
CALCIUM SPEC-SCNC: 9.4 MG/DL (ref 8.6–10.5)
CHLORIDE SERPL-SCNC: 100 MMOL/L (ref 98–107)
CHOLEST SERPL-MCNC: 216 MG/DL (ref 0–200)
CO2 SERPL-SCNC: 26.3 MMOL/L (ref 22–29)
CREAT SERPL-MCNC: 0.91 MG/DL (ref 0.57–1)
DEPRECATED RDW RBC AUTO: 42.7 FL (ref 37–54)
EGFRCR SERPLBLD CKD-EPI 2021: 73.3 ML/MIN/1.73
EOSINOPHIL # BLD AUTO: 0.09 10*3/MM3 (ref 0–0.4)
EOSINOPHIL NFR BLD AUTO: 1.7 % (ref 0.3–6.2)
ERYTHROCYTE [DISTWIDTH] IN BLOOD BY AUTOMATED COUNT: 12.8 % (ref 12.3–15.4)
FOLATE SERPL-MCNC: 11.3 NG/ML (ref 4.78–24.2)
GLOBULIN UR ELPH-MCNC: 2.4 GM/DL
GLUCOSE SERPL-MCNC: 97 MG/DL (ref 65–99)
HBA1C MFR BLD: 6 % (ref 4.8–5.6)
HCT VFR BLD AUTO: 39.5 % (ref 34–46.6)
HDLC SERPL-MCNC: 74 MG/DL (ref 40–60)
HGB BLD-MCNC: 13.4 G/DL (ref 12–15.9)
IMM GRANULOCYTES # BLD AUTO: 0.03 10*3/MM3 (ref 0–0.05)
IMM GRANULOCYTES NFR BLD AUTO: 0.6 % (ref 0–0.5)
LDLC SERPL CALC-MCNC: 118 MG/DL (ref 0–100)
LDLC/HDLC SERPL: 1.55 {RATIO}
LYMPHOCYTES # BLD AUTO: 1.55 10*3/MM3 (ref 0.7–3.1)
LYMPHOCYTES NFR BLD AUTO: 29.2 % (ref 19.6–45.3)
MCH RBC QN AUTO: 30.8 PG (ref 26.6–33)
MCHC RBC AUTO-ENTMCNC: 33.9 G/DL (ref 31.5–35.7)
MCV RBC AUTO: 90.8 FL (ref 79–97)
MONOCYTES # BLD AUTO: 0.37 10*3/MM3 (ref 0.1–0.9)
MONOCYTES NFR BLD AUTO: 7 % (ref 5–12)
NEUTROPHILS NFR BLD AUTO: 3.22 10*3/MM3 (ref 1.7–7)
NEUTROPHILS NFR BLD AUTO: 60.7 % (ref 42.7–76)
NRBC BLD AUTO-RTO: 0 /100 WBC (ref 0–0.2)
PLATELET # BLD AUTO: 253 10*3/MM3 (ref 140–450)
PMV BLD AUTO: 9.5 FL (ref 6–12)
POTASSIUM SERPL-SCNC: 4.4 MMOL/L (ref 3.5–5.2)
PROT SERPL-MCNC: 6.6 G/DL (ref 6–8.5)
RBC # BLD AUTO: 4.35 10*6/MM3 (ref 3.77–5.28)
SODIUM SERPL-SCNC: 137 MMOL/L (ref 136–145)
TRIGL SERPL-MCNC: 137 MG/DL (ref 0–150)
VIT B12 BLD-MCNC: 657 PG/ML (ref 211–946)
VLDLC SERPL-MCNC: 24 MG/DL (ref 5–40)
WBC NRBC COR # BLD: 5.3 10*3/MM3 (ref 3.4–10.8)

## 2023-06-01 PROCEDURE — 80053 COMPREHEN METABOLIC PANEL: CPT | Performed by: INTERNAL MEDICINE

## 2023-06-01 PROCEDURE — 82746 ASSAY OF FOLIC ACID SERUM: CPT | Performed by: INTERNAL MEDICINE

## 2023-06-01 PROCEDURE — 80061 LIPID PANEL: CPT | Performed by: INTERNAL MEDICINE

## 2023-06-01 PROCEDURE — 82607 VITAMIN B-12: CPT | Performed by: INTERNAL MEDICINE

## 2023-06-01 PROCEDURE — 83036 HEMOGLOBIN GLYCOSYLATED A1C: CPT | Performed by: INTERNAL MEDICINE

## 2023-06-01 PROCEDURE — 85025 COMPLETE CBC W/AUTO DIFF WBC: CPT | Performed by: INTERNAL MEDICINE

## 2023-06-01 PROCEDURE — 36415 COLL VENOUS BLD VENIPUNCTURE: CPT | Performed by: INTERNAL MEDICINE

## 2023-06-15 ENCOUNTER — HOSPITAL ENCOUNTER (OUTPATIENT)
Dept: ULTRASOUND IMAGING | Facility: HOSPITAL | Age: 58
Discharge: HOME | End: 2023-06-15
Admitting: NURSE PRACTITIONER
Payer: COMMERCIAL

## 2023-06-15 ENCOUNTER — HOSPITAL ENCOUNTER (OUTPATIENT)
Dept: NUCLEAR MEDICINE | Facility: HOSPITAL | Age: 58
Discharge: HOME | End: 2023-06-15
Payer: COMMERCIAL

## 2023-06-15 DIAGNOSIS — R10.11 ACUTE ABDOMINAL PAIN IN RIGHT UPPER QUADRANT: ICD-10-CM

## 2023-06-15 PROCEDURE — 0 TECHNETIUM TC 99M MEBROFENIN KIT: Performed by: NURSE PRACTITIONER

## 2023-06-15 PROCEDURE — 78226 HEPATOBILIARY SYSTEM IMAGING: CPT

## 2023-06-15 PROCEDURE — A9537 TC99M MEBROFENIN: HCPCS | Performed by: NURSE PRACTITIONER

## 2023-06-15 PROCEDURE — 76705 ECHO EXAM OF ABDOMEN: CPT

## 2023-06-15 RX ORDER — KIT FOR THE PREPARATION OF TECHNETIUM TC 99M MEBROFENIN 45 MG/10ML
1 INJECTION, POWDER, LYOPHILIZED, FOR SOLUTION INTRAVENOUS
Status: COMPLETED | OUTPATIENT
Start: 2023-06-15 | End: 2023-06-15

## 2023-06-15 RX ADMIN — KIT FOR THE PREPARATION OF TECHNETIUM TC 99M MEBROFENIN 1 DOSE: 45 INJECTION, POWDER, LYOPHILIZED, FOR SOLUTION INTRAVENOUS at 08:44

## 2023-06-16 ENCOUNTER — OFFICE VISIT (OUTPATIENT)
Dept: INTERNAL MEDICINE | Facility: CLINIC | Age: 58
End: 2023-06-16
Payer: COMMERCIAL

## 2023-06-16 VITALS
SYSTOLIC BLOOD PRESSURE: 143 MMHG | HEART RATE: 74 BPM | WEIGHT: 191.8 LBS | TEMPERATURE: 97.3 F | HEIGHT: 64 IN | BODY MASS INDEX: 32.74 KG/M2 | OXYGEN SATURATION: 94 % | DIASTOLIC BLOOD PRESSURE: 85 MMHG

## 2023-06-16 DIAGNOSIS — E53.8 VITAMIN B12 DEFICIENCY: Primary | ICD-10-CM

## 2023-06-16 DIAGNOSIS — I10 ESSENTIAL HYPERTENSION: ICD-10-CM

## 2023-06-16 DIAGNOSIS — E78.2 MIXED HYPERLIPIDEMIA: ICD-10-CM

## 2023-06-16 DIAGNOSIS — F32.89 OTHER DEPRESSION: ICD-10-CM

## 2023-06-16 DIAGNOSIS — R73.03 PREDIABETES: ICD-10-CM

## 2023-06-16 DIAGNOSIS — R73.01 IFG (IMPAIRED FASTING GLUCOSE): ICD-10-CM

## 2023-06-16 PROCEDURE — 99214 OFFICE O/P EST MOD 30 MIN: CPT | Performed by: INTERNAL MEDICINE

## 2023-06-16 RX ORDER — BUPROPION HYDROCHLORIDE 150 MG/1
150 TABLET ORAL DAILY
Qty: 30 TABLET | Refills: 5 | Status: SHIPPED | OUTPATIENT
Start: 2023-06-16

## 2023-06-16 NOTE — PROGRESS NOTES
"CHIEF COMPLAINT/ HPI:  vitamin b12 deficency  and Follow-up (Patient is here for a routine follow up  ) following up with recent abdominal pains ultrasound of the gallbladder done Su 15, 2023, and to follow-up with labs, needs refills of all medications,    Trying to lose weight, and eating well, still with abdominal I have no control of eating she says,          Objective   Vital Signs  Vitals:    06/16/23 0839   BP: 143/85   Pulse: 74   Temp: 97.3 °F (36.3 °C)   SpO2: 94%   Weight: 87 kg (191 lb 12.8 oz)   Height: 162.6 cm (64.02\")      Body mass index is 32.91 kg/m².  Review of Systems   Constitutional:  Positive for unexpected weight gain.   HENT: Negative.     Eyes: Negative.    Respiratory: Negative.     Cardiovascular: Negative.    Gastrointestinal:  Positive for abdominal pain.   Endocrine: Negative.    Genitourinary: Negative.    Musculoskeletal: Negative.    Allergic/Immunologic: Negative.    Neurological: Negative.    Hematological: Negative.    Psychiatric/Behavioral: Negative.      Physical Exam  Constitutional:       General: She is not in acute distress.     Appearance: Normal appearance.   HENT:      Head: Normocephalic.      Mouth/Throat:      Mouth: Mucous membranes are moist.   Eyes:      Conjunctiva/sclera: Conjunctivae normal.      Pupils: Pupils are equal, round, and reactive to light.   Cardiovascular:      Rate and Rhythm: Normal rate and regular rhythm.      Pulses: Normal pulses.      Heart sounds: Normal heart sounds.   Pulmonary:      Effort: Pulmonary effort is normal.      Breath sounds: Normal breath sounds.   Abdominal:      General: Bowel sounds are normal.      Palpations: Abdomen is soft.   Musculoskeletal:         General: No swelling. Normal range of motion.      Cervical back: Neck supple.   Skin:     General: Skin is warm and dry.      Coloration: Skin is not jaundiced.   Neurological:      General: No focal deficit present.      Mental Status: She is alert and oriented to " person, place, and time. Mental status is at baseline.   Psychiatric:         Mood and Affect: Mood normal.         Behavior: Behavior normal.         Thought Content: Thought content normal.         Judgment: Judgment normal.      Result Review :   No results found for: PROBNP, BNP  CMP          11/16/2022    10:38 6/1/2023    09:25   CMP   Glucose 84  97    BUN 17  18    Creatinine 0.92  0.91    EGFR 72.3  73.3    Sodium 138  137    Potassium 4.9  4.4    Chloride 101  100    Calcium 9.8  9.4    Total Protein 6.7  6.6    Albumin 4.50  4.2    Globulin 2.2  2.4    Total Bilirubin 0.4  0.4    Alkaline Phosphatase 81  96    AST (SGOT) 18  20    ALT (SGPT) 15  18    Albumin/Globulin Ratio 2.0  1.8    BUN/Creatinine Ratio 18.5  19.8    Anion Gap 11.0  10.7      CBC w/diff          11/16/2022    10:38 6/1/2023    09:25   CBC w/Diff   WBC 5.29  5.30    RBC 4.22  4.35    Hemoglobin 13.2  13.4    Hematocrit 38.6  39.5    MCV 91.5  90.8    MCH 31.3  30.8    MCHC 34.2  33.9    RDW 12.3  12.8    Platelets 249  253    Neutrophil Rel % 54.8  60.7    Immature Granulocyte Rel % 0.4  0.6    Lymphocyte Rel % 34.2  29.2    Monocyte Rel % 8.3  7.0    Eosinophil Rel % 1.7  1.7    Basophil Rel % 0.6  0.8       Lipid Panel          11/16/2022    10:38 6/1/2023    09:25   Lipid Panel   Total Cholesterol 222  216    Triglycerides 96  137    HDL Cholesterol 70  74    VLDL Cholesterol 17  24    LDL Cholesterol  135  118    LDL/HDL Ratio 1.90  1.55       Lab Results   Component Value Date    TSH 3.120 11/16/2022    TSH 2.330 12/02/2021    TSH 3.440 04/24/2019      Lab Results   Component Value Date    FREET4 1.29 11/16/2022    FREET4 1.34 12/02/2021    FREET4 1.4 04/24/2019      A1C Last 3 Results          11/16/2022    10:38 6/1/2023    09:25   HGBA1C Last 3 Results   Hemoglobin A1C 6.00  6.00                        Visit Diagnoses:    ICD-10-CM ICD-9-CM   1. Vitamin B12 deficiency  E53.8 266.2   2. Prediabetes  R73.03 790.29   3. IFG  (impaired fasting glucose)  R73.01 790.21   4. Essential hypertension  I10 401.9   5. Mixed hyperlipidemia  E78.2 272.2   6. Other depression  F32.89 311       Assessment and Plan   Diagnoses and all orders for this visit:    1. Vitamin B12 deficiency (Primary)  -     Hepatitis C antibody; Future  -     Semaglutide-Weight Management 0.25 MG/0.5ML solution auto-injector; Inject 0.25 mg under the skin into the appropriate area as directed 1 (One) Time Per Week.  Dispense: 2 mL; Refill: 2  -     Hemoglobin A1c; Future  -     Comprehensive Metabolic Panel; Future  -     CBC & Differential; Future  -     TSH+Free T4; Future    2. Prediabetes  -     Hepatitis C antibody; Future  -     Semaglutide-Weight Management 0.25 MG/0.5ML solution auto-injector; Inject 0.25 mg under the skin into the appropriate area as directed 1 (One) Time Per Week.  Dispense: 2 mL; Refill: 2  -     Hemoglobin A1c; Future  -     Comprehensive Metabolic Panel; Future  -     CBC & Differential; Future  -     TSH+Free T4; Future    3. IFG (impaired fasting glucose)  -     Hepatitis C antibody; Future  -     Semaglutide-Weight Management 0.25 MG/0.5ML solution auto-injector; Inject 0.25 mg under the skin into the appropriate area as directed 1 (One) Time Per Week.  Dispense: 2 mL; Refill: 2  -     Hemoglobin A1c; Future  -     Comprehensive Metabolic Panel; Future  -     CBC & Differential; Future  -     TSH+Free T4; Future    4. Essential hypertension  -     Hepatitis C antibody; Future  -     Semaglutide-Weight Management 0.25 MG/0.5ML solution auto-injector; Inject 0.25 mg under the skin into the appropriate area as directed 1 (One) Time Per Week.  Dispense: 2 mL; Refill: 2  -     Hemoglobin A1c; Future  -     Comprehensive Metabolic Panel; Future  -     CBC & Differential; Future  -     TSH+Free T4; Future    5. Mixed hyperlipidemia  -     Hepatitis C antibody; Future  -     Semaglutide-Weight Management 0.25 MG/0.5ML solution auto-injector;  Inject 0.25 mg under the skin into the appropriate area as directed 1 (One) Time Per Week.  Dispense: 2 mL; Refill: 2  -     Hemoglobin A1c; Future  -     Comprehensive Metabolic Panel; Future  -     CBC & Differential; Future  -     TSH+Free T4; Future    6. Other depression  -     Hepatitis C antibody; Future  -     Semaglutide-Weight Management 0.25 MG/0.5ML solution auto-injector; Inject 0.25 mg under the skin into the appropriate area as directed 1 (One) Time Per Week.  Dispense: 2 mL; Refill: 2  -     Hemoglobin A1c; Future  -     Comprehensive Metabolic Panel; Future  -     CBC & Differential; Future  -     TSH+Free T4; Future             Patient had abdominal pains May 9, 2023 came to see the nurse practitioner ultrasound of the gallbladder was performed Su 15, 2023, and lab work has been reviewed, she is here for her routine follow-up also, ultrasound shows pancreas obscured by bowel gas no evidence of gallstones or cholecystitis, no biliary ductal dilation, nuclear medicine HIDA scan, normal examination Su 15, 2023 Margarito, labs noted normal liver function,    Constipation, treatment options discussed,    Mixed hyperlipidemia continues  Crestor 20 mg daily,    Coronary artery calcifications on recent CT scan discussed cardiac risks atherosclerosis etc. changing statins continue, December 16, 2022 encourage patient to eat healthy, walk every day monitor her blood pressure, discussed December 16, 2022----consider cardiology opinion for stress testing as a baseline, discussed June 2023    Hypothyroidism continues levothyroxine 25 mcg daily    Depression, has been taking Paxil daily,, consider switching medication to Wellbutrin to help with weight loss, June 2023    Impaired fasting glucose hemoglobin A1c at 6.0, June 1, 2023    Gastroesophageal reflux disease continues omeprazole 40 mg daily    Hypertension continues lisinopril HCTZ 20-12.5 mg daily,    Pulmonary nodule, 3 mm left lower lobe---- last CAT  scan December 23, 2021, --- CT scan December 14, 2022 showed coronary artery calcifications finding consistent with prior granulomatous disease no new or enlarging pulmonary nodules--- the previously identified 3 mm left lower lobe nodule was not identified December 2022    Vitamin B12 at 262 --dec 2022 --- continues over-the-counter B12 supplements daily, improved, June 2023,    Tobacco use,--smoked total of 37 years, quit about 5 years ago, gained some weight,        Follow Up   Return in about 6 months (around 12/16/2023).  Patient was given instructions and counseling regarding her condition or for health maintenance advice. Please see specific information pulled into the AVS if appropriate.

## 2023-06-19 ENCOUNTER — TELEPHONE (OUTPATIENT)
Dept: INTERNAL MEDICINE | Facility: CLINIC | Age: 58
End: 2023-06-19
Payer: COMMERCIAL

## 2023-06-19 PROBLEM — E05.90 HYPERTHYROIDISM: Status: ACTIVE | Noted: 2022-11-16

## 2023-06-19 PROBLEM — K22.70 BARRETT'S ESOPHAGUS: Status: ACTIVE | Noted: 2023-06-19

## 2023-06-22 DIAGNOSIS — E78.2 MIXED HYPERLIPIDEMIA: ICD-10-CM

## 2023-06-22 DIAGNOSIS — R73.01 IFG (IMPAIRED FASTING GLUCOSE): ICD-10-CM

## 2023-06-22 DIAGNOSIS — I10 ESSENTIAL HYPERTENSION: ICD-10-CM

## 2023-06-22 DIAGNOSIS — F32.89 OTHER DEPRESSION: ICD-10-CM

## 2023-06-22 DIAGNOSIS — R73.03 PREDIABETES: ICD-10-CM

## 2023-06-22 DIAGNOSIS — E53.8 VITAMIN B12 DEFICIENCY: ICD-10-CM

## 2023-06-30 ENCOUNTER — TELEPHONE (OUTPATIENT)
Dept: INTERNAL MEDICINE | Facility: CLINIC | Age: 58
End: 2023-06-30

## 2023-06-30 NOTE — TELEPHONE ENCOUNTER
Caller: Gualberto Gloria    Relationship: Self    Best call back number: [phone], CAN LEAVE MESSAGE    What medications are you currently taking:   Current Outpatient Medications on File Prior to Visit   Medication Sig Dispense Refill    aspirin 81 MG EC tablet Take 1 tablet by mouth Every Other Day. 90 tablet 2    atorvastatin (Lipitor) 20 MG tablet Take 1 tablet by mouth Daily. 90 tablet 3    buPROPion XL (Wellbutrin XL) 150 MG 24 hr tablet Take 1 tablet by mouth Daily. 30 tablet 5    Cholecalciferol (Vitamin D3) 50 MCG (2000 UT) tablet Take 1 tablet by mouth Every Other Day. 30 tablet 2    HYDROcodone-acetaminophen (NORCO) 5-325 MG per tablet       ibuprofen (ADVIL,MOTRIN) 800 MG tablet Take 1 tablet by mouth Every 6 (Six) Hours As Needed for Mild Pain , Fever or Headache. 30 tablet 0    levothyroxine (SYNTHROID, LEVOTHROID) 25 MCG tablet Take 1 tablet by mouth Daily. 90 tablet 2    lisinopril-hydrochlorothiazide (PRINZIDE,ZESTORETIC) 20-12.5 MG per tablet Take 1 tablet by mouth Daily. 90 tablet 3    omeprazole (priLOSEC) 40 MG capsule Take 1 capsule by mouth Daily. 90 capsule 3    PARoxetine (PAXIL) 40 MG tablet Take 1 tablet by mouth Every Morning. 90 tablet 3    rosuvastatin (Crestor) 20 MG tablet Take 1 tablet by mouth Daily. 90 tablet 3    Semaglutide-Weight Management 0.25 MG/0.5ML solution auto-injector Inject 0.25 mg under the skin into the appropriate area as directed 1 (One) Time Per Week. 2 mL 2    valACYclovir (Valtrex) 500 MG tablet Take 1 tablet by mouth 2 (Two) Times a Day. 60 tablet 2     No current facility-administered medications on file prior to visit.        Which medication are you concerned about: buPROPion XL (Wellbutrin XL) 150 MG 24 hr tablet     Who prescribed you this medication: MICHAEL    What are your concerns: PATIENT HAS BEEN HAVING HEADACHES, WEIRD DREAMS, AND NOT FEELING LIKE HER NORMAL SELF. SHE IS REQUESTING CALL TO LET HER KNOW. SHE IS NO LONGER TAKING THIS MEDICATION.      How long have you had these concerns: AFTER LAST VISIT

## 2023-07-01 NOTE — TELEPHONE ENCOUNTER
If this is something urgernt or needs me to talk then make her appt??   I don't really understand message or ?

## 2023-07-06 NOTE — TELEPHONE ENCOUNTER
Patient just wanting to let our office know she is not taking the Wellbutrin and is going back on paxil

## 2023-08-04 ENCOUNTER — TELEPHONE (OUTPATIENT)
Dept: INTERNAL MEDICINE | Facility: CLINIC | Age: 58
End: 2023-08-04

## 2023-08-04 NOTE — TELEPHONE ENCOUNTER
Caller: Gualberto Gloria    Relationship: Self    Best call back number: [phone]     What medication are you requesting: OZEMPIC    What are your current symptoms: PREDIABETIC, INCREASED APPETITE    How long have you been experiencing symptoms: ABOUT 9 MONTHS    Have you had these symptoms before:    [x] Yes  [] No    Have you been treated for these symptoms before:   [] Yes  [x] No    If a prescription is needed, what is your preferred pharmacy and phone number: [address] [phone] [facility] [phone]      Additional notes: PATIENT STATES THAT NO PHARMACY HAS BEEN ABLE TO FILL HER WEGOVY SINCE IT WAS PRESCRIBED SO SHE WOULD LIKE TO SWITCH THE PRESCRIPTION TO OZEMPIC.

## 2023-08-08 ENCOUNTER — TELEPHONE (OUTPATIENT)
Dept: INTERNAL MEDICINE | Facility: CLINIC | Age: 58
End: 2023-08-08
Payer: COMMERCIAL

## 2023-08-08 NOTE — TELEPHONE ENCOUNTER
Pt states that she would be willing to try the Saxenda in the meantime, but will want the Wegovy once it becomes available.

## 2023-08-08 NOTE — TELEPHONE ENCOUNTER
Pt is asking for a alternative for Wegovy, there is a shortage of it. Ozempic not covered by insurance. Please advise.

## 2023-08-08 NOTE — TELEPHONE ENCOUNTER
Tell the patient there is no alternative to Wegovy other than Saxenda which is a daily shot for weight loss if she is interested in that I can send that in for her to start taking but it is every day    If she needs to make an appointment to come in and discuss we can do that, let me know what she wants to do

## 2023-08-10 ENCOUNTER — TELEPHONE (OUTPATIENT)
Dept: INTERNAL MEDICINE | Facility: CLINIC | Age: 58
End: 2023-08-10
Payer: COMMERCIAL

## 2023-08-10 NOTE — TELEPHONE ENCOUNTER
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 PATIENT STATED THAT HER HANDS ARE REALLY HURTING FROM DRIVING AND SHE WOULD LIKE A STEROID SHOT IN HER HANDS.      
Pa on saxenda approved   
[facility]

## 2023-08-11 ENCOUNTER — OFFICE VISIT (OUTPATIENT)
Dept: INTERNAL MEDICINE | Facility: CLINIC | Age: 58
End: 2023-08-11
Payer: COMMERCIAL

## 2023-08-11 VITALS
TEMPERATURE: 97.2 F | HEART RATE: 61 BPM | WEIGHT: 194 LBS | OXYGEN SATURATION: 99 % | DIASTOLIC BLOOD PRESSURE: 90 MMHG | HEIGHT: 64 IN | SYSTOLIC BLOOD PRESSURE: 114 MMHG | BODY MASS INDEX: 33.12 KG/M2

## 2023-08-11 DIAGNOSIS — M19.041 PRIMARY OSTEOARTHRITIS OF BOTH HANDS: Primary | ICD-10-CM

## 2023-08-11 DIAGNOSIS — M19.042 PRIMARY OSTEOARTHRITIS OF BOTH HANDS: Primary | ICD-10-CM

## 2023-08-11 PROCEDURE — 99213 OFFICE O/P EST LOW 20 MIN: CPT

## 2023-08-11 NOTE — PROGRESS NOTES
Chief Complaint  Arthritis (The patient is complaining of arthritis in her hands. She would like a steroid shot for her arthritis. 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 She has been taking tylenol. )    History of Present Illness  SUBJECTIVE  Gualberto Gloria presents to [facility] with complaints of bilateral hand pain. She states she does have arthritic pain in her hands. She states she has had a steroid injection in the past and it did help.   She has been taking some motrin and that helps.      Past Medical History:   Diagnosis Date    COPD (chronic obstructive pulmonary disease)     Hyperlipidemia     Hypertension       Family History   Problem Relation Age of Onset    Diabetes Mother     Heart disease Mother       Past Surgical History:   Procedure Laterality Date    BACK SURGERY      COLONOSCOPY      TUBAL ABDOMINAL LIGATION          Current Outpatient Medications:     aspirin 81 MG EC tablet, Take 1 tablet by mouth Every Other Day., Disp: 90 tablet, Rfl: 2    atorvastatin (Lipitor) 20 MG tablet, Take 1 tablet by mouth Daily., Disp: 90 tablet, Rfl: 3    buPROPion XL (Wellbutrin XL) 150 MG 24 hr tablet, Take 1 tablet by mouth Daily., Disp: 30 tablet, Rfl: 5    Cholecalciferol (Vitamin D3) 50 MCG (2000 UT) tablet, Take 1 tablet by mouth Every Other Day., Disp: 30 tablet, Rfl: 2    HYDROcodone-acetaminophen (NORCO) 5-325 MG per tablet, , Disp: , Rfl:     ibuprofen (ADVIL,MOTRIN) 800 MG tablet, Take 1 tablet by mouth Every 6 (Six) Hours As Needed for Mild Pain , Fever or Headache., Disp: 30 tablet, Rfl: 0    levothyroxine (SYNTHROID, LEVOTHROID) 25 MCG tablet, Take 1 tablet by mouth Daily., Disp: 90 tablet, Rfl: 2    Liraglutide (SAXENDA) 18 MG/3ML injection pen, Inject 0.6mg under skin daily for week one, THEN 1.2mg daily for week two, THEN 1.8mg daily for week three, then 2.4mg daily for week four., Disp: 3 mL, Rfl: 5     "lisinopril-hydrochlorothiazide (PRINZIDE,ZESTORETIC) 20-12.5 MG per tablet, Take 1 tablet by mouth Daily., Disp: 90 tablet, Rfl: 3    omeprazole (priLOSEC) 40 MG capsule, Take 1 capsule by mouth Daily., Disp: 90 capsule, Rfl: 3    PARoxetine (PAXIL) 40 MG tablet, Take 1 tablet by mouth Every Morning., Disp: 90 tablet, Rfl: 3    rosuvastatin (Crestor) 20 MG tablet, Take 1 tablet by mouth Daily., Disp: 90 tablet, Rfl: 3    Semaglutide-Weight Management 0.25 MG/0.5ML solution auto-injector, Inject 0.25 mg under the skin into the appropriate area as directed 1 (One) Time Per Week., Disp: 2 mL, Rfl: 2    valACYclovir (Valtrex) 500 MG tablet, Take 1 tablet by mouth 2 (Two) Times a Day., Disp: 60 tablet, Rfl: 2    OBJECTIVE  Vital Signs:   /90 (BP Location: Right arm, Patient Position: Sitting, Cuff Size: Large Adult)   Pulse 61   Temp 97.2 øF (36.2 øC) (Temporal)   Ht 162.6 cm (64.02\")   Wt 88 kg (194 lb)   SpO2 99%   BMI 33.28 kg/mý    Estimated body mass index is 33.28 kg/mý as calculated from the following:    Height as of this encounter: 162.6 cm (64.02\").    Weight as of this encounter: 88 kg (194 lb).     Wt Readings from Last 3 Encounters:   08/11/23 88 kg (194 lb)   06/16/23 87 kg (191 lb 12.8 oz)   05/09/23 86.7 kg (191 lb 3.2 oz)     BP Readings from Last 3 Encounters:   08/11/23 114/90   06/16/23 143/85   05/09/23 98/60       Physical Exam  Vitals and nursing note reviewed.   Constitutional:       Appearance: Normal appearance.   HENT:      Head: Normocephalic.   Eyes:      Extraocular Movements: Extraocular movements intact.      Conjunctiva/sclera: Conjunctivae normal.   Cardiovascular:      Rate and Rhythm: Normal rate and regular rhythm.      Heart sounds: Normal heart sounds. No murmur heard.  Pulmonary:      Effort: Pulmonary effort is normal.      Breath sounds: Normal breath sounds. No wheezing or rales.   Musculoskeletal:         General: No swelling. Normal range of motion.      Cervical " back: Normal range of motion and neck supple.   Skin:     General: Skin is warm and dry.   Neurological:      General: No focal deficit present.      Mental Status: She is alert and oriented to person, place, and time. Mental status is at baseline.   Psychiatric:         Mood and Affect: Mood normal.         Behavior: Behavior normal.         Thought Content: Thought content normal.         Judgment: Judgment normal.        Result Review    CMP          11/16/2022    10:38 6/1/2023    09:25   CMP   Glucose 84  97    BUN 17  18    Creatinine 0.92  0.91    EGFR 72.3  73.3    Sodium 138  137    Potassium 4.9  4.4    Chloride 101  100    Calcium 9.8  9.4    Total Protein 6.7  6.6    Albumin 4.50  4.2    Globulin 2.2  2.4    Total Bilirubin 0.4  0.4    Alkaline Phosphatase 81  96    AST (SGOT) 18  20    ALT (SGPT) 15  18    Albumin/Globulin Ratio 2.0  1.8    BUN/Creatinine Ratio 18.5  19.8    Anion Gap 11.0  10.7      Most Recent A1C          6/1/2023    09:25   HGBA1C Most Recent   Hemoglobin A1C 6.00        A1C Last 3 Results          11/16/2022    10:38 6/1/2023    09:25   HGBA1C Last 3 Results   Hemoglobin A1C 6.00  6.00        NM HIDA SCAN WITHOUT PHARMACOLOGICAL INTERVENTION    Result Date: 6/15/2023   Normal examination.      MALU JO MD       Electronically Signed and Approved By: MALU JO MD on 6/15/2023 at 11:35             US Gallbladder    Result Date: 6/15/2023    1. Pancreas is obscured by bowel gas. 2. No evidence for cholelithiasis or cholecystitis.  There could be a small amount of sludge in the gallbladder.  No biliary dilation.     SHAILA VALENCIA MD       Electronically Signed and Approved By: SHAILA VALENCIA MD on 6/15/2023 at 11:49                The above data has been reviewed by ANA LAURA Andersen 08/11/2023 08:09 EDT.          Patient Care Team:  Jaspal Gutierrez MD as PCP - General (Internal Medicine)        ASSESSMENT & PLAN    Diagnoses and all orders for this  visit:    1. Primary osteoarthritis of both hands (Primary)  Assessment & Plan:  Patient complaits of bilateral hand pain. She states she does have arthritic pain in her hands. She states she has had a steroid injection in the past and it did help.   She has been taking some motrin and that helps some.   Will give her depomedrol injection today.   She may need to have xrays or further work up if needed.  I reviewed her labs and sugar is well controlled.           Tobacco Use: Medium Risk    Smoking Tobacco Use: Former    Smokeless Tobacco Use: Never    Passive Exposure: Not on file       Follow Up     Return if symptoms worsen or fail to improve.    Please note that portions of this note were completed with a voice recognition program.    Patient was given instructions and counseling regarding her condition or for health maintenance advice. Please see specific information pulled into the AVS if appropriate.   I have reviewed information obtained and documented by others and I have confirmed the accuracy of this documented note.    ANA LAURA Andersen

## 2023-08-11 NOTE — ASSESSMENT & PLAN NOTE
Patient complaits of bilateral hand pain. She states she does have arthritic pain in her hands. She states she has had a steroid injection in the past and it did help.   She has been taking some motrin and that helps some.   Will give her depomedrol injection today.   I reviewed her labs and sugar is well controlled.  RTC if no improvement.

## 2023-08-14 ENCOUNTER — TELEPHONE (OUTPATIENT)
Dept: INTERNAL MEDICINE | Facility: CLINIC | Age: 58
End: 2023-08-14
Payer: COMMERCIAL

## 2023-08-14 DIAGNOSIS — M19.041 PRIMARY OSTEOARTHRITIS OF BOTH HANDS: Primary | ICD-10-CM

## 2023-08-14 DIAGNOSIS — M19.042 PRIMARY OSTEOARTHRITIS OF BOTH HANDS: Primary | ICD-10-CM

## 2023-08-14 NOTE — TELEPHONE ENCOUNTER
We can give her a toradol injection for the pain but that is only temporary. But we discussed going to ortho and they can do injections there

## 2023-08-14 NOTE — TELEPHONE ENCOUNTER
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 PATIENT WOULD LIKE TO KNOW IF SHE COULD GET A DIFFERENT TYPE OF SHOT. PLEASE ADVISE

## 2023-08-28 DIAGNOSIS — E03.8 OTHER SPECIFIED HYPOTHYROIDISM: ICD-10-CM

## 2023-08-28 RX ORDER — LEVOTHYROXINE SODIUM 0.03 MG/1
TABLET ORAL
Qty: 90 TABLET | Refills: 0 | Status: SHIPPED | OUTPATIENT
Start: 2023-08-28

## 2023-08-30 ENCOUNTER — TELEPHONE (OUTPATIENT)
Dept: ORTHOPEDIC SURGERY | Facility: CLINIC | Age: 58
End: 2023-08-30
Payer: COMMERCIAL

## 2023-08-30 NOTE — TELEPHONE ENCOUNTER
PROVIDER OUT OF OFFICE FROM 7;30-10:45 ON 09/06 LVM FOR PT TO CALL BACK AND RESCHEDULE. OK FOR HUB TO SCHEDULE.

## 2023-09-13 ENCOUNTER — OFFICE VISIT (OUTPATIENT)
Dept: ORTHOPEDIC SURGERY | Facility: CLINIC | Age: 58
End: 2023-09-13
Payer: COMMERCIAL

## 2023-09-13 VITALS
WEIGHT: 190 LBS | HEIGHT: 64 IN | BODY MASS INDEX: 32.44 KG/M2 | DIASTOLIC BLOOD PRESSURE: 78 MMHG | SYSTOLIC BLOOD PRESSURE: 123 MMHG | OXYGEN SATURATION: 97 % | HEART RATE: 68 BPM

## 2023-09-13 DIAGNOSIS — M79.642 BILATERAL HAND PAIN: Primary | ICD-10-CM

## 2023-09-13 DIAGNOSIS — M79.641 BILATERAL HAND PAIN: Primary | ICD-10-CM

## 2023-09-13 DIAGNOSIS — M18.0 ARTHRITIS OF CARPOMETACARPAL (CMC) JOINT OF BOTH THUMBS: ICD-10-CM

## 2023-09-13 RX ORDER — TRIAMCINOLONE ACETONIDE 40 MG/ML
40 INJECTION, SUSPENSION INTRA-ARTICULAR; INTRAMUSCULAR
Status: COMPLETED | OUTPATIENT
Start: 2023-09-13 | End: 2023-09-13

## 2023-09-13 RX ORDER — LIDOCAINE HYDROCHLORIDE 10 MG/ML
1 INJECTION, SOLUTION INFILTRATION; PERINEURAL
Status: COMPLETED | OUTPATIENT
Start: 2023-09-13 | End: 2023-09-13

## 2023-09-13 RX ORDER — TRIAMCINOLONE ACETONIDE 40 MG/ML
20 INJECTION, SUSPENSION INTRA-ARTICULAR; INTRAMUSCULAR
Status: COMPLETED | OUTPATIENT
Start: 2023-09-13 | End: 2023-09-13

## 2023-09-13 RX ADMIN — TRIAMCINOLONE ACETONIDE 40 MG: 40 INJECTION, SUSPENSION INTRA-ARTICULAR; INTRAMUSCULAR at 10:53

## 2023-09-13 RX ADMIN — TRIAMCINOLONE ACETONIDE 20 MG: 40 INJECTION, SUSPENSION INTRA-ARTICULAR; INTRAMUSCULAR at 10:53

## 2023-09-13 RX ADMIN — LIDOCAINE HYDROCHLORIDE 1 ML: 10 INJECTION, SOLUTION INFILTRATION; PERINEURAL at 10:53

## 2023-09-13 NOTE — PROGRESS NOTES
"Chief Complaint  Pain and Initial Evaluation of the Left Hand and Pain and Initial Evaluation of the Right Hand     Subjective      Gualberto Gloria presents to [facility] for initial evaluation of bilateral hands.  She has arthritis and pain at the base of her thumb.  She has pain with FMC and  strength.  She has not braced or tried topical creams at this time.     Allergies   Allergen Reactions    Codeine Nausea Only        Social History     Socioeconomic History    Marital status:    Tobacco Use    Smoking status: Former     Packs/day: 1.00     Years: 37.00     Pack years: 37.00     Types: Cigarettes     Quit date: 2017     Years since quittin.7    Smokeless tobacco: Never   Vaping Use    Vaping Use: Never used   Substance and Sexual Activity    Alcohol use: Not Currently        I reviewed the patient's chief complaint, history of present illness, review of systems, past medical history, surgical history, family history, social history, medications, and allergy list.     Review of Systems     Constitutional: Denies fevers, chills, weight loss  Cardiovascular: Denies chest pain, shortness of breath  Skin: Denies rashes, acute skin changes  Neurologic: Denies headache, loss of consciousness        Vital Signs:   /78   Pulse 68   Ht 162.6 cm (64\")   Wt 86.2 kg (190 lb)   SpO2 97%   BMI 32.61 kg/m²          Physical Exam  General: Alert. No acute distress    Ortho Exam        BILATERAL HANDS Negative Compression testing/ Negative Tinels. NegativeFinkelsteins. Negative Mcmanus's testing. Positive CMC grind testing. Negative Phalens. Full ROM of the hand, fingers, elbow and wrist. Negative Triggering of the digit. Sensation grossly intact to light touch, median, radial and ulnar nerve. Positive AIN, PIN and ulnar nerve motor function intact. Axillary nerve intact. Positive pulses.         Small Joint Arthrocentesis: R thumb CMC  Consent given by: " patient  Site marked: site marked  Timeout: Immediately prior to procedure a time out was called to verify the correct patient, procedure, equipment, support staff and site/side marked as required   Supporting Documentation  Indications: pain   Procedure Details  Location: thumb - R thumb CMC  Preparation: Patient was prepped and draped in the usual sterile fashion  Needle gauge: 23g.  Medications administered: 20 mg triamcinolone acetonide 40 MG/ML; 1 mL lidocaine 1 %  Patient tolerance: patient tolerated the procedure well with no immediate complications      Small Joint Arthrocentesis: L thumb CMC  Consent given by: patient  Site marked: site marked  Timeout: Immediately prior to procedure a time out was called to verify the correct patient, procedure, equipment, support staff and site/side marked as required   Supporting Documentation  Indications: pain   Procedure Details  Location: thumb - L thumb CMC  Preparation: Patient was prepped and draped in the usual sterile fashion  Needle gauge: 23.  Medications administered: 40 mg triamcinolone acetonide 40 MG/ML; 1 mL lidocaine 1 %  Patient tolerance: patient tolerated the procedure well with no immediate complications          Imaging Results (Most Recent)       Procedure Component Value Units Date/Time    XR Hand 3+ View Right [[id number]] Resulted: 09/13/23 1002     Updated: 09/13/23 1004    XR Hand 3+ View Left [[id number]] Resulted: 09/13/23 1002     Updated: 09/13/23 1004             Result Review :     X-Ray Report:  Right hand  X-Ray  Indication: Evaluation of the right hand  AP/Lateral view(s)  Findings: Mild to moderate arthritis.   Prior studies available for comparison: no   .  X-Ray Report:  Left hand  X-Ray  Indication: Evaluation of the left hand  AP/Lateral view(s)  Findings: Mild to moderate arthritis.   Prior studies available for comparison: no             Assessment and Plan     Diagnoses and all orders for this visit:    1. Bilateral hand pain  (Primary)  -     XR Hand 3+ View Right  -     XR Hand 3+ View Left    2. Arthritis of carpometacarpal (CMC) joint of both thumbs        Discussed the treatment plan with the patient.     Prescribed compound cream.  She denies braces.     Discussed the risks and benefits of conservative measures.  The patient expressed understanding and wished to proceed with bilateral thumb CMC injection.  She tolerated the injection well.         Call or return if worsening symptoms.    Follow Up     PRN      Patient was given instructions and counseling regarding her condition or for health maintenance advice. Please see specific information pulled into the AVS if appropriate.     Scribed for Cody Womack MD by Cornelia Mitchell MA.  09/13/23   10:12 EDT    I have personally performed the services described in this document as scribed by the above individual and it is both accurate and complete. Cody Womack MD 09/13/23

## 2023-10-31 ENCOUNTER — OFFICE VISIT (OUTPATIENT)
Dept: INTERNAL MEDICINE | Facility: CLINIC | Age: 58
End: 2023-10-31
Payer: COMMERCIAL

## 2023-10-31 VITALS
HEART RATE: 73 BPM | WEIGHT: 192 LBS | HEIGHT: 64 IN | SYSTOLIC BLOOD PRESSURE: 132 MMHG | TEMPERATURE: 98.2 F | OXYGEN SATURATION: 97 % | DIASTOLIC BLOOD PRESSURE: 80 MMHG | BODY MASS INDEX: 32.78 KG/M2

## 2023-10-31 DIAGNOSIS — I10 PRIMARY HYPERTENSION: ICD-10-CM

## 2023-10-31 DIAGNOSIS — M54.2 NECK PAIN, ACUTE: ICD-10-CM

## 2023-10-31 DIAGNOSIS — R05.1 ACUTE COUGH: Primary | ICD-10-CM

## 2023-10-31 PROBLEM — R05.9 COUGH: Status: ACTIVE | Noted: 2023-10-31

## 2023-10-31 RX ORDER — CYCLOBENZAPRINE HCL 5 MG
TABLET ORAL
Qty: 20 TABLET | Refills: 0 | Status: SHIPPED | OUTPATIENT
Start: 2023-10-31

## 2023-10-31 RX ORDER — PREDNISONE 20 MG/1
20 TABLET ORAL 2 TIMES DAILY
Qty: 10 TABLET | Refills: 0 | Status: SHIPPED | OUTPATIENT
Start: 2023-10-31 | End: 2023-11-05

## 2023-10-31 NOTE — ASSESSMENT & PLAN NOTE
Blood pressure is fine as of her 10/23 urgent visit.  She is stable to continue moderate dose lisinopril/HCT as written.

## 2023-10-31 NOTE — ASSESSMENT & PLAN NOTE
This was the main indications for the patient's visit.  Been going on for several days now, no known exposures, we tested her for COVID/flu, and they were both negative.  She is afebrile, her sats are 97% on room air.    Patient's lungs are clear, no associated allergy type symptoms etc, her symptoms are worse when she is supine.    Treat per orders, patient call if not improving as expected.

## 2023-10-31 NOTE — PROGRESS NOTES
"Chief Complaint  Neck Pain (Pt states that at the base of her neck is hurting and goes down into right shoulder. She states that her right ear is hurting and feels stopped up. ) and Cough (She has a slight cough with a tingle in her throat. This has been going on for 3-4 days. )    Subjective      Gualberto Gloria presents to [facility]    Neck Pain   Pertinent negatives include no chest pain or fever.   Cough  Pertinent negatives include no chest pain, ear pain, fever, shortness of breath or wheezing.     History of present illness:  Patient is a 58-year-old female, patient of Dr. Stephen, with underlying hypertension, hyperlipidemia, hypothyroidism, depression, among others, who is being seen 10/23 for urgent visit in regards to issues outlined in the chief complaint above.    Review of Systems   Constitutional:  Negative for appetite change, fatigue and fever.   HENT:  Negative for congestion and ear pain.    Eyes:  Negative for blurred vision.   Respiratory:  Positive for cough. Negative for chest tightness, shortness of breath and wheezing.    Cardiovascular:  Negative for chest pain, palpitations and leg swelling.   Gastrointestinal:  Negative for abdominal pain.   Genitourinary:  Negative for difficulty urinating, dysuria and hematuria.   Musculoskeletal:  Positive for neck pain. Negative for arthralgias and gait problem.   Skin:  Negative for skin lesions.   Neurological:  Negative for syncope, memory problem and confusion.   Psychiatric/Behavioral:  Negative for self-injury and depressed mood.        Objective   Vital Signs:   /80   Pulse 73   Temp 98.2 °F (36.8 °C) (Skin)   Ht 162.6 cm (64.02\")   Wt 87.1 kg (192 lb)   SpO2 97%   BMI 32.94 kg/m²           Physical Exam  Vitals and nursing note reviewed.   Constitutional:       General: She is not in acute distress.     Appearance: Normal appearance. She is not toxic-appearing.   HENT:      Head: Atraumatic. "      Right Ear: External ear normal.      Left Ear: External ear normal.      Nose: Nose normal.      Mouth/Throat:      Mouth: Mucous membranes are moist.   Eyes:      General:         Right eye: No discharge.         Left eye: No discharge.      Extraocular Movements: Extraocular movements intact.      Pupils: Pupils are equal, round, and reactive to light.   Cardiovascular:      Rate and Rhythm: Normal rate and regular rhythm.      Pulses: Normal pulses.      Heart sounds: Normal heart sounds. No murmur heard.     No gallop.   Pulmonary:      Effort: Pulmonary effort is normal. No respiratory distress.      Breath sounds: No wheezing, rhonchi or rales.   Abdominal:      General: There is no distension.      Palpations: Abdomen is soft. There is no mass.      Tenderness: There is no abdominal tenderness. There is no guarding.   Musculoskeletal:         General: No swelling or tenderness.      Cervical back: No tenderness.      Right lower leg: No edema.      Left lower leg: No edema.   Skin:     General: Skin is warm and dry.      Findings: No rash.   Neurological:      General: No focal deficit present.      Mental Status: She is alert and oriented to person, place, and time. Mental status is at baseline.      Motor: No weakness.      Gait: Gait normal.   Psychiatric:         Mood and Affect: Mood normal.         Thought Content: Thought content normal.          Result Review   The following data was reviewed by: Dhruv Campos MD on 10/31/2023:  [] Laboratory  [] Microbiology  [] Radiology  [] EKG/telemetry  [] Cardiology/Vascular  [] Pathology  [] Old records             Assessment and Plan   Diagnoses and all orders for this visit:    1. Acute cough (Primary)  Assessment & Plan:  This was the main indications for the patient's visit.  Been going on for several days now, no known exposures, we tested her for COVID/flu, and they were both negative.  She is afebrile, her sats are 97% on room air.    Patient's  lungs are clear, no associated allergy type symptoms etc, her symptoms are worse when she is supine.    Treat per orders, patient call if not improving as expected.    Orders:  -     POCT SARS-CoV-2 Antigen JAVED + Flu    2. Neck pain, acute  Assessment & Plan:  This been going on about the same on time as the cough.  She feels like she is having a bad muscle spasm in there.  She is taking her routine 800 mg Motrin that she uses for arthritis, but no significant benefit.  Is been using a heating pad as well.    There is no spinous process tenderness, she is discomfort in her right trap mainly, so will use muscle relaxer, so I recommend she continue with the heating pad, Bengay/etc.      3. Primary hypertension  Assessment & Plan:  Blood pressure is fine as of her 10/23 urgent visit.  She is stable to continue moderate dose lisinopril/HCT as written.      Other orders  -     predniSONE (DELTASONE) 20 MG tablet; Take 1 tablet by mouth 2 (Two) Times a Day for 5 days.  Dispense: 10 tablet; Refill: 0  -     cyclobenzaprine (FLEXERIL) 5 MG tablet; 1 to 2 tablets at bedtime as needed for muscle spasm.  Dispense: 20 tablet; Refill: 0                      Follow Up   Return for Next scheduled follow up.  Patient was given instructions and counseling regarding her condition or for health maintenance advice. Please see specific information pulled into the AVS if appropriate.     Total Time Spent:   minutes     This time includes time spent by me in the following activities: preparing for the visit, reviewing extensive past medical history and tests, performing a medically appropriate examination and/or evaluation, counseling and educating the patient and/or caregivers, ordering medications, tests, or procedures, referring and/or communicating with other health care professionals and documenting information in the medical record all on this date of service.

## 2023-10-31 NOTE — ASSESSMENT & PLAN NOTE
This been going on about the same on time as the cough.  She feels like she is having a bad muscle spasm in there.  She is taking her routine 800 mg Motrin that she uses for arthritis, but no significant benefit.  Is been using a heating pad as well.    There is no spinous process tenderness, she is discomfort in her right trap mainly, so will use muscle relaxer, so I recommend she continue with the heating pad, Bengay/etc.

## 2023-11-06 ENCOUNTER — TELEPHONE (OUTPATIENT)
Dept: INTERNAL MEDICINE | Facility: CLINIC | Age: 58
End: 2023-11-06
Payer: COMMERCIAL

## 2023-11-06 DIAGNOSIS — M54.2 NECK PAIN, ACUTE: Primary | ICD-10-CM

## 2023-11-06 RX ORDER — MELOXICAM 7.5 MG/1
TABLET ORAL
Qty: 30 TABLET | Refills: 0 | Status: SHIPPED | OUTPATIENT
Start: 2023-11-06

## 2023-11-06 NOTE — TELEPHONE ENCOUNTER
Script sent.  Order placed for x-rays as well.  She can't take motrin or anything OTC with the med I sent other than tylenol.

## 2023-11-06 NOTE — TELEPHONE ENCOUNTER
Caller: Gualberto Gloria    Relationship: Self    Best call back number:     [phone]     What medication are you requesting: PAIN MEDICATION    What are your current symptoms: NECK AND SHOULDER PAIN    How long have you been experiencing symptoms: SINCE 10.31.23    Have you had these symptoms before:    [x] Yes  [] No    Have you been treated for these symptoms before:   [x] Yes  [] No    If a prescription is needed, what is your preferred pharmacy and phone number: [address] [phone] [facility] [phone]      Additional notes: PATIENT STATES THAT SHE IS NOT TAKING THE STEROIDS THAT WERE PRESCRIBED BECAUSE THEY MADE HER FACE HOT AND RED. 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       PATIENT STATES THAT SHE ORIGINALLY SAW DR MARIEE FOR THIS ISSUE. PATIENT STATES THAT SHE WOULD LIKE A CALL WHEN A DECISION IS MADE REGARDING THIS MEDICATION.

## 2023-11-30 DIAGNOSIS — E03.8 OTHER SPECIFIED HYPOTHYROIDISM: ICD-10-CM

## 2023-11-30 RX ORDER — LEVOTHYROXINE SODIUM 0.03 MG/1
TABLET ORAL
Qty: 90 TABLET | Refills: 0 | Status: SHIPPED | OUTPATIENT
Start: 2023-11-30

## 2023-11-30 RX ORDER — OMEPRAZOLE 40 MG/1
40 CAPSULE, DELAYED RELEASE ORAL DAILY
Qty: 90 CAPSULE | Refills: 0 | Status: SHIPPED | OUTPATIENT
Start: 2023-11-30

## 2023-12-04 ENCOUNTER — OFFICE VISIT (OUTPATIENT)
Dept: INTERNAL MEDICINE | Facility: CLINIC | Age: 58
End: 2023-12-04
Payer: COMMERCIAL

## 2023-12-04 VITALS
TEMPERATURE: 98.8 F | RESPIRATION RATE: 18 BRPM | DIASTOLIC BLOOD PRESSURE: 76 MMHG | BODY MASS INDEX: 32.95 KG/M2 | HEIGHT: 64 IN | WEIGHT: 193 LBS | HEART RATE: 64 BPM | OXYGEN SATURATION: 97 % | SYSTOLIC BLOOD PRESSURE: 116 MMHG

## 2023-12-04 DIAGNOSIS — J01.40 ACUTE NON-RECURRENT PANSINUSITIS: Primary | ICD-10-CM

## 2023-12-04 RX ORDER — CEFTRIAXONE 1 G/1
1 INJECTION, POWDER, FOR SOLUTION INTRAMUSCULAR; INTRAVENOUS ONCE
Status: COMPLETED | OUTPATIENT
Start: 2023-12-04 | End: 2023-12-04

## 2023-12-04 RX ORDER — METHYLPREDNISOLONE ACETATE 40 MG/ML
60 INJECTION, SUSPENSION INTRA-ARTICULAR; INTRALESIONAL; INTRAMUSCULAR; SOFT TISSUE ONCE
Status: COMPLETED | OUTPATIENT
Start: 2023-12-04 | End: 2023-12-04

## 2023-12-04 RX ORDER — AMOXICILLIN AND CLAVULANATE POTASSIUM 875; 125 MG/1; MG/1
1 TABLET, FILM COATED ORAL 2 TIMES DAILY
Qty: 20 TABLET | Refills: 0 | Status: SHIPPED | OUTPATIENT
Start: 2023-12-04 | End: 2023-12-14

## 2023-12-04 RX ADMIN — METHYLPREDNISOLONE ACETATE 60 MG: 40 INJECTION, SUSPENSION INTRA-ARTICULAR; INTRALESIONAL; INTRAMUSCULAR; SOFT TISSUE at 12:27

## 2023-12-04 RX ADMIN — CEFTRIAXONE 1 G: 1 INJECTION, POWDER, FOR SOLUTION INTRAMUSCULAR; INTRAVENOUS at 12:26

## 2023-12-04 NOTE — PROGRESS NOTES
Chief Complaint  Sinus Problem (58 year old female here today complaining of a sinus congestion, headache, and facial pressure X 1 week States she is unable to cough anything up or blow out through nose, states draining straight down her throat.  States OTC meds are not helping)    History of Present Illness  SUBJECTIVE  Gualberto Gloria presents to [facility] with URI symptoms.    Patient complains of sinus pain/pressure, congestion, headache, mild cough.   Patient denies fever, chills, nausea, wheezing, vomiting or diarrhea.  She has tried OTC remedies.     Symptom onset-9 days    She states she does not do well with oral steroids.       Past Medical History:   Diagnosis Date    COPD (chronic obstructive pulmonary disease)     Hyperlipidemia     Hypertension       Family History   Problem Relation Age of Onset    Diabetes Mother     Heart disease Mother       Past Surgical History:   Procedure Laterality Date    BACK SURGERY      COLONOSCOPY      TUBAL ABDOMINAL LIGATION          Current Outpatient Medications:     aspirin 81 MG EC tablet, Take 1 tablet by mouth Every Other Day., Disp: 90 tablet, Rfl: 2    atorvastatin (Lipitor) 20 MG tablet, Take 1 tablet by mouth Daily., Disp: 90 tablet, Rfl: 3    Cholecalciferol (Vitamin D3) 50 MCG (2000 UT) tablet, Take 1 tablet by mouth Every Other Day., Disp: 30 tablet, Rfl: 2    HYDROcodone-acetaminophen (NORCO) 5-325 MG per tablet, , Disp: , Rfl:     levothyroxine (SYNTHROID, LEVOTHROID) 25 MCG tablet, Take 1 tablet by mouth once daily, Disp: 90 tablet, Rfl: 0    lisinopril-hydrochlorothiazide (PRINZIDE,ZESTORETIC) 20-12.5 MG per tablet, Take 1 tablet by mouth Daily., Disp: 90 tablet, Rfl: 3    omeprazole (priLOSEC) 40 MG capsule, Take 1 capsule by mouth once daily, Disp: 90 capsule, Rfl: 0    PARoxetine (PAXIL) 40 MG tablet, Take 1 tablet by mouth Every Morning., Disp: 90 tablet, Rfl: 3    amoxicillin-clavulanate (AUGMENTIN) 875-125 MG  "per tablet, Take 1 tablet by mouth 2 (Two) Times a Day for 10 days. Take with food., Disp: 20 tablet, Rfl: 0    buPROPion XL (Wellbutrin XL) 150 MG 24 hr tablet, Take 1 tablet by mouth Daily., Disp: 30 tablet, Rfl: 5  No current facility-administered medications for this visit.    OBJECTIVE  Vital Signs:   /76 (BP Location: Left arm, Patient Position: Sitting)   Pulse 64   Temp 98.8 °F (37.1 °C) (Temporal)   Resp 18   Ht 162.6 cm (64.02\")   Wt 87.5 kg (193 lb)   SpO2 97%   BMI 33.11 kg/m²    Estimated body mass index is 33.11 kg/m² as calculated from the following:    Height as of this encounter: 162.6 cm (64.02\").    Weight as of this encounter: 87.5 kg (193 lb).     Wt Readings from Last 3 Encounters:   12/04/23 87.5 kg (193 lb)   10/31/23 87.1 kg (192 lb)   09/13/23 86.2 kg (190 lb)     BP Readings from Last 3 Encounters:   12/04/23 116/76   10/31/23 132/80   09/13/23 123/78       Physical Exam  Vitals and nursing note reviewed.   Constitutional:       Appearance: Normal appearance.   HENT:      Head: Normocephalic.      Right Ear: A middle ear effusion is present.      Left Ear: A middle ear effusion is present.      Nose: Congestion present.      Right Sinus: Maxillary sinus tenderness and frontal sinus tenderness present.      Left Sinus: Maxillary sinus tenderness and frontal sinus tenderness present.   Eyes:      Extraocular Movements: Extraocular movements intact.      Conjunctiva/sclera: Conjunctivae normal.   Cardiovascular:      Rate and Rhythm: Normal rate and regular rhythm.      Heart sounds: Normal heart sounds. No murmur heard.  Pulmonary:      Effort: Pulmonary effort is normal.      Breath sounds: Normal breath sounds. No wheezing or rales.   Abdominal:      General: Bowel sounds are normal.      Palpations: Abdomen is soft.      Tenderness: There is no abdominal tenderness. There is no guarding.   Musculoskeletal:         General: No swelling. Normal range of motion.   Skin:     " General: Skin is warm and dry.   Neurological:      General: No focal deficit present.      Mental Status: She is alert. Mental status is at baseline.   Psychiatric:         Mood and Affect: Mood normal.         Thought Content: Thought content normal.          Result Review    Most Recent A1C          6/1/2023    09:25   HGBA1C Most Recent   Hemoglobin A1C 6.00        A1C Last 3 Results          6/1/2023    09:25   HGBA1C Last 3 Results   Hemoglobin A1C 6.00        No Images in the past 120 days found..     The above data has been reviewed by ANA LAURA Andersen 12/04/2023 12:04 EST.          Patient Care Team:  Jaspal Gutierrez MD as PCP - General (Internal Medicine)      ASSESSMENT & PLAN    Diagnoses and all orders for this visit:    1. Acute non-recurrent pansinusitis (Primary)  Assessment & Plan:  Patient complains of sinus pain/pressure, congestion, headache, mild cough.   Patient denies fever, chills, nausea, wheezing, vomiting or diarrhea.  She has tried OTC remedies.   Symptom onset-9 days  Treatment per orders.  RTC/call the office if symptoms do not improve.    Orders:  -     methylPREDNISolone acetate (DEPO-medrol) injection 60 mg  -     cefTRIAXone (ROCEPHIN) injection 1 g    Other orders  -     amoxicillin-clavulanate (AUGMENTIN) 875-125 MG per tablet; Take 1 tablet by mouth 2 (Two) Times a Day for 10 days. Take with food.  Dispense: 20 tablet; Refill: 0         Tobacco Use: Medium Risk (12/4/2023)    Patient History     Smoking Tobacco Use: Former     Smokeless Tobacco Use: Never     Passive Exposure: Not on file       Follow Up     No follow-ups on file.    Please note that portions of this note were completed with a voice recognition program.    Patient was given instructions and counseling regarding her condition or for health maintenance advice. Please see specific information pulled into the AVS if appropriate.   I have reviewed information obtained and documented by others and I have  confirmed the accuracy of this documented note.    Etelvina Cardoso, APRN

## 2023-12-11 ENCOUNTER — LAB (OUTPATIENT)
Dept: INTERNAL MEDICINE | Facility: CLINIC | Age: 58
End: 2023-12-11
Payer: COMMERCIAL

## 2023-12-11 DIAGNOSIS — I10 ESSENTIAL HYPERTENSION: ICD-10-CM

## 2023-12-11 DIAGNOSIS — F32.89 OTHER DEPRESSION: ICD-10-CM

## 2023-12-11 DIAGNOSIS — E78.2 MIXED HYPERLIPIDEMIA: ICD-10-CM

## 2023-12-11 DIAGNOSIS — R73.01 IFG (IMPAIRED FASTING GLUCOSE): ICD-10-CM

## 2023-12-11 DIAGNOSIS — R73.03 PREDIABETES: ICD-10-CM

## 2023-12-11 DIAGNOSIS — E53.8 VITAMIN B12 DEFICIENCY: ICD-10-CM

## 2023-12-11 LAB
ALBUMIN SERPL-MCNC: 4.6 G/DL (ref 3.5–5.2)
ALBUMIN/GLOB SERPL: 1.8 G/DL
ALP SERPL-CCNC: 125 U/L (ref 39–117)
ALT SERPL W P-5'-P-CCNC: 14 U/L (ref 1–33)
ANION GAP SERPL CALCULATED.3IONS-SCNC: 11 MMOL/L (ref 5–15)
AST SERPL-CCNC: 21 U/L (ref 1–32)
BASOPHILS # BLD AUTO: 0.05 10*3/MM3 (ref 0–0.2)
BASOPHILS NFR BLD AUTO: 0.7 % (ref 0–1.5)
BILIRUB SERPL-MCNC: 0.4 MG/DL (ref 0–1.2)
BUN SERPL-MCNC: 16 MG/DL (ref 6–20)
BUN/CREAT SERPL: 16 (ref 7–25)
CALCIUM SPEC-SCNC: 9.9 MG/DL (ref 8.6–10.5)
CHLORIDE SERPL-SCNC: 97 MMOL/L (ref 98–107)
CO2 SERPL-SCNC: 27 MMOL/L (ref 22–29)
CREAT SERPL-MCNC: 1 MG/DL (ref 0.57–1)
DEPRECATED RDW RBC AUTO: 43.4 FL (ref 37–54)
EGFRCR SERPLBLD CKD-EPI 2021: 65 ML/MIN/1.73
EOSINOPHIL # BLD AUTO: 0.13 10*3/MM3 (ref 0–0.4)
EOSINOPHIL NFR BLD AUTO: 1.7 % (ref 0.3–6.2)
ERYTHROCYTE [DISTWIDTH] IN BLOOD BY AUTOMATED COUNT: 13 % (ref 12.3–15.4)
GLOBULIN UR ELPH-MCNC: 2.6 GM/DL
GLUCOSE SERPL-MCNC: 95 MG/DL (ref 65–99)
HBA1C MFR BLD: 6.3 % (ref 4.8–5.6)
HCT VFR BLD AUTO: 42.6 % (ref 34–46.6)
HCV AB SER DONR QL: NORMAL
HGB BLD-MCNC: 14.3 G/DL (ref 12–15.9)
IMM GRANULOCYTES # BLD AUTO: 0.04 10*3/MM3 (ref 0–0.05)
IMM GRANULOCYTES NFR BLD AUTO: 0.5 % (ref 0–0.5)
LYMPHOCYTES # BLD AUTO: 2.34 10*3/MM3 (ref 0.7–3.1)
LYMPHOCYTES NFR BLD AUTO: 30.8 % (ref 19.6–45.3)
MCH RBC QN AUTO: 31.1 PG (ref 26.6–33)
MCHC RBC AUTO-ENTMCNC: 33.6 G/DL (ref 31.5–35.7)
MCV RBC AUTO: 92.6 FL (ref 79–97)
MONOCYTES # BLD AUTO: 0.67 10*3/MM3 (ref 0.1–0.9)
MONOCYTES NFR BLD AUTO: 8.8 % (ref 5–12)
NEUTROPHILS NFR BLD AUTO: 4.37 10*3/MM3 (ref 1.7–7)
NEUTROPHILS NFR BLD AUTO: 57.5 % (ref 42.7–76)
NRBC BLD AUTO-RTO: 0 /100 WBC (ref 0–0.2)
PLATELET # BLD AUTO: 301 10*3/MM3 (ref 140–450)
PMV BLD AUTO: 9.1 FL (ref 6–12)
POTASSIUM SERPL-SCNC: 4.3 MMOL/L (ref 3.5–5.2)
PROT SERPL-MCNC: 7.2 G/DL (ref 6–8.5)
RBC # BLD AUTO: 4.6 10*6/MM3 (ref 3.77–5.28)
SODIUM SERPL-SCNC: 135 MMOL/L (ref 136–145)
T4 FREE SERPL-MCNC: 1.36 NG/DL (ref 0.93–1.7)
TSH SERPL DL<=0.05 MIU/L-ACNC: 2.86 UIU/ML (ref 0.27–4.2)
WBC NRBC COR # BLD AUTO: 7.6 10*3/MM3 (ref 3.4–10.8)

## 2023-12-11 PROCEDURE — 36415 COLL VENOUS BLD VENIPUNCTURE: CPT | Performed by: INTERNAL MEDICINE

## 2023-12-11 PROCEDURE — 86803 HEPATITIS C AB TEST: CPT | Performed by: INTERNAL MEDICINE

## 2023-12-11 PROCEDURE — 84439 ASSAY OF FREE THYROXINE: CPT | Performed by: INTERNAL MEDICINE

## 2023-12-11 PROCEDURE — 83036 HEMOGLOBIN GLYCOSYLATED A1C: CPT | Performed by: INTERNAL MEDICINE

## 2023-12-11 PROCEDURE — 80050 GENERAL HEALTH PANEL: CPT | Performed by: INTERNAL MEDICINE

## 2023-12-12 DIAGNOSIS — F32.89 OTHER DEPRESSION: ICD-10-CM

## 2023-12-12 RX ORDER — PAROXETINE HYDROCHLORIDE 40 MG/1
40 TABLET, FILM COATED ORAL EVERY MORNING
Qty: 90 TABLET | Refills: 0 | Status: SHIPPED | OUTPATIENT
Start: 2023-12-12

## 2023-12-15 ENCOUNTER — OFFICE VISIT (OUTPATIENT)
Dept: INTERNAL MEDICINE | Facility: CLINIC | Age: 58
End: 2023-12-15
Payer: COMMERCIAL

## 2023-12-15 VITALS
TEMPERATURE: 98.6 F | WEIGHT: 192.8 LBS | SYSTOLIC BLOOD PRESSURE: 111 MMHG | HEIGHT: 64 IN | RESPIRATION RATE: 18 BRPM | HEART RATE: 93 BPM | BODY MASS INDEX: 32.91 KG/M2 | DIASTOLIC BLOOD PRESSURE: 77 MMHG | OXYGEN SATURATION: 95 %

## 2023-12-15 DIAGNOSIS — E06.3 HYPOTHYROIDISM DUE TO HASHIMOTO'S THYROIDITIS: ICD-10-CM

## 2023-12-15 DIAGNOSIS — N18.31 STAGE 3A CHRONIC KIDNEY DISEASE: ICD-10-CM

## 2023-12-15 DIAGNOSIS — M19.042 PRIMARY OSTEOARTHRITIS OF BOTH HANDS: ICD-10-CM

## 2023-12-15 DIAGNOSIS — E03.8 HYPOTHYROIDISM DUE TO HASHIMOTO'S THYROIDITIS: ICD-10-CM

## 2023-12-15 DIAGNOSIS — E78.2 MIXED HYPERLIPIDEMIA: ICD-10-CM

## 2023-12-15 DIAGNOSIS — M19.041 PRIMARY OSTEOARTHRITIS OF BOTH HANDS: ICD-10-CM

## 2023-12-15 DIAGNOSIS — R73.01 IFG (IMPAIRED FASTING GLUCOSE): ICD-10-CM

## 2023-12-15 DIAGNOSIS — K22.70 BARRETT'S ESOPHAGUS WITHOUT DYSPLASIA: ICD-10-CM

## 2023-12-15 DIAGNOSIS — F32.89 OTHER DEPRESSION: ICD-10-CM

## 2023-12-15 DIAGNOSIS — E53.8 VITAMIN B12 DEFICIENCY: Primary | ICD-10-CM

## 2023-12-15 DIAGNOSIS — I10 ESSENTIAL HYPERTENSION: ICD-10-CM

## 2023-12-15 RX ORDER — SEMAGLUTIDE 0.25 MG/.5ML
0.25 INJECTION, SOLUTION SUBCUTANEOUS WEEKLY
COMMUNITY

## 2023-12-15 RX ORDER — VALACYCLOVIR HYDROCHLORIDE 500 MG/1
500 TABLET, FILM COATED ORAL 2 TIMES DAILY
Qty: 60 TABLET | Refills: 5 | Status: SHIPPED | OUTPATIENT
Start: 2023-12-15

## 2023-12-15 RX ORDER — IBUPROFEN 800 MG/1
800 TABLET ORAL EVERY 6 HOURS PRN
Qty: 90 TABLET | Refills: 5 | Status: SHIPPED | OUTPATIENT
Start: 2023-12-15

## 2023-12-15 RX ORDER — IBUPROFEN 800 MG/1
800 TABLET ORAL EVERY 6 HOURS PRN
COMMUNITY

## 2023-12-15 NOTE — PROGRESS NOTES
"CHIEF COMPLAINT/ HPI:  Follow-up (6 month follow up)    Patient had recent illness upper respiratory infection saw the nurse practitioner got antibiotics,          Objective   Vital Signs  Vitals:    12/15/23 0931   BP: 111/77   BP Location: Right arm   Patient Position: Sitting   Pulse: 93   Resp: 18   Temp: 98.6 °F (37 °C)   TempSrc: Temporal   SpO2: 95%   Weight: 87.5 kg (192 lb 12.8 oz)   Height: 162.6 cm (64.02\")      Body mass index is 33.08 kg/m².  Review of Systems   Constitutional: Negative.    HENT: Negative.     Eyes: Negative.    Respiratory: Negative.     Cardiovascular: Negative.    Gastrointestinal: Negative.    Endocrine: Negative.    Genitourinary: Negative.    Musculoskeletal: Negative.    Allergic/Immunologic: Negative.    Neurological: Negative.    Hematological: Negative.    Psychiatric/Behavioral: Negative.        Physical Exam  Constitutional:       General: She is not in acute distress.     Appearance: Normal appearance.   HENT:      Head: Normocephalic.      Mouth/Throat:      Mouth: Mucous membranes are moist.   Eyes:      Conjunctiva/sclera: Conjunctivae normal.      Pupils: Pupils are equal, round, and reactive to light.   Cardiovascular:      Rate and Rhythm: Normal rate and regular rhythm.      Pulses: Normal pulses.      Heart sounds: Normal heart sounds.   Pulmonary:      Effort: Pulmonary effort is normal.      Breath sounds: Normal breath sounds.   Abdominal:      General: Abdomen is flat. Bowel sounds are normal.      Palpations: Abdomen is soft.   Musculoskeletal:         General: No swelling. Normal range of motion.      Cervical back: Neck supple.   Skin:     General: Skin is warm and dry.      Coloration: Skin is not jaundiced.   Neurological:      General: No focal deficit present.      Mental Status: She is alert and oriented to person, place, and time. Mental status is at baseline.   Psychiatric:         Mood and Affect: Mood normal.         Behavior: Behavior normal.        " " Thought Content: Thought content normal.         Judgment: Judgment normal.        Result Review :   No results found for: \"PROBNP\", \"BNP\"  CMP          6/1/2023    09:25 12/11/2023    09:05   CMP   Glucose 97  95    BUN 18  16    Creatinine 0.91  1.00    EGFR 73.3  65.0    Sodium 137  135    Potassium 4.4  4.3    Chloride 100  97    Calcium 9.4  9.9    Total Protein 6.6  7.2    Albumin 4.2  4.6    Globulin 2.4  2.6    Total Bilirubin 0.4  0.4    Alkaline Phosphatase 96  125    AST (SGOT) 20  21    ALT (SGPT) 18  14    Albumin/Globulin Ratio 1.8  1.8    BUN/Creatinine Ratio 19.8  16.0    Anion Gap 10.7  11.0      CBC w/diff          6/1/2023    09:25 12/11/2023    09:05   CBC w/Diff   WBC 5.30  7.60    RBC 4.35  4.60    Hemoglobin 13.4  14.3    Hematocrit 39.5  42.6    MCV 90.8  92.6    MCH 30.8  31.1    MCHC 33.9  33.6    RDW 12.8  13.0    Platelets 253  301    Neutrophil Rel % 60.7  57.5    Immature Granulocyte Rel % 0.6  0.5    Lymphocyte Rel % 29.2  30.8    Monocyte Rel % 7.0  8.8    Eosinophil Rel % 1.7  1.7    Basophil Rel % 0.8  0.7       Lipid Panel          6/1/2023    09:25   Lipid Panel   Total Cholesterol 216    Triglycerides 137    HDL Cholesterol 74    VLDL Cholesterol 24    LDL Cholesterol  118    LDL/HDL Ratio 1.55       Lab Results   Component Value Date    TSH 2.860 12/11/2023    TSH 3.120 11/16/2022    TSH 2.330 12/02/2021      Lab Results   Component Value Date    FREET4 1.36 12/11/2023    FREET4 1.29 11/16/2022    FREET4 1.34 12/02/2021      A1C Last 3 Results          6/1/2023    09:25 12/11/2023    09:05   HGBA1C Last 3 Results   Hemoglobin A1C 6.00  6.30                        Visit Diagnoses:    ICD-10-CM ICD-9-CM   1. Vitamin B12 deficiency  E53.8 266.2   2. Stage 3a chronic kidney disease  N18.31 585.3   3. Other depression  F32.89 311   4. Primary osteoarthritis of both hands  M19.041 715.14    M19.042    5. Johnson's esophagus without dysplasia  K22.70 530.85   6. Mixed hyperlipidemia  " E78.2 272.2   7. Essential hypertension  I10 401.9   8. IFG (impaired fasting glucose)  R73.01 790.21   9. Hypothyroidism due to Hashimoto's thyroiditis  E03.8 244.8    E06.3 245.2       Assessment and Plan   Diagnoses and all orders for this visit:    1. Vitamin B12 deficiency (Primary)  -     Lipid Panel; Future  -     Comprehensive Metabolic Panel; Future  -     CBC & Differential; Future  -     Hemoglobin A1c; Future  -     TSH+Free T4; Future  -     MicroAlbumin, Urine, Random - Urine, Clean Catch; Future  -     Vitamin D,25-Hydroxy; Future    2. Stage 3a chronic kidney disease  -     Lipid Panel; Future  -     Comprehensive Metabolic Panel; Future  -     CBC & Differential; Future  -     Hemoglobin A1c; Future  -     TSH+Free T4; Future  -     MicroAlbumin, Urine, Random - Urine, Clean Catch; Future  -     Vitamin D,25-Hydroxy; Future    3. Other depression  -     Lipid Panel; Future  -     Comprehensive Metabolic Panel; Future  -     CBC & Differential; Future  -     Hemoglobin A1c; Future  -     TSH+Free T4; Future  -     MicroAlbumin, Urine, Random - Urine, Clean Catch; Future  -     Vitamin D,25-Hydroxy; Future    4. Primary osteoarthritis of both hands  -     Lipid Panel; Future  -     Comprehensive Metabolic Panel; Future  -     CBC & Differential; Future  -     Hemoglobin A1c; Future  -     TSH+Free T4; Future  -     MicroAlbumin, Urine, Random - Urine, Clean Catch; Future  -     Vitamin D,25-Hydroxy; Future    5. Johnson's esophagus without dysplasia  -     Lipid Panel; Future  -     Comprehensive Metabolic Panel; Future  -     CBC & Differential; Future  -     Hemoglobin A1c; Future  -     TSH+Free T4; Future  -     MicroAlbumin, Urine, Random - Urine, Clean Catch; Future  -     Vitamin D,25-Hydroxy; Future    6. Mixed hyperlipidemia  -     Lipid Panel; Future  -     Comprehensive Metabolic Panel; Future  -     CBC & Differential; Future  -     Hemoglobin A1c; Future  -     TSH+Free T4; Future  -      MicroAlbumin, Urine, Random - Urine, Clean Catch; Future  -     Vitamin D,25-Hydroxy; Future    7. Essential hypertension  -     Lipid Panel; Future  -     Comprehensive Metabolic Panel; Future  -     CBC & Differential; Future  -     Hemoglobin A1c; Future  -     TSH+Free T4; Future  -     MicroAlbumin, Urine, Random - Urine, Clean Catch; Future  -     Vitamin D,25-Hydroxy; Future    8. IFG (impaired fasting glucose)  -     Lipid Panel; Future  -     Comprehensive Metabolic Panel; Future  -     CBC & Differential; Future  -     Hemoglobin A1c; Future  -     TSH+Free T4; Future  -     MicroAlbumin, Urine, Random - Urine, Clean Catch; Future  -     Vitamin D,25-Hydroxy; Future    9. Hypothyroidism due to Hashimoto's thyroiditis  -     Lipid Panel; Future  -     Comprehensive Metabolic Panel; Future  -     CBC & Differential; Future  -     Hemoglobin A1c; Future  -     TSH+Free T4; Future  -     MicroAlbumin, Urine, Random - Urine, Clean Catch; Future  -     Vitamin D,25-Hydroxy; Future    Other orders  -     Tirzepatide-Weight Management (ZEPBOUND) 2.5 MG/0.5ML solution auto-injector; Inject 0.5 mL under the skin into the appropriate area as directed 1 (One) Time Per Week.  Dispense: 2 mL; Refill: 5  -     valACYclovir (Valtrex) 500 MG tablet; Take 1 tablet by mouth 2 (Two) Times a Day.  Dispense: 60 tablet; Refill: 5  -     ibuprofen (ADVIL,MOTRIN) 800 MG tablet; Take 1 tablet by mouth Every 6 (Six) Hours As Needed for Mild Pain.  Dispense: 90 tablet; Refill: 5    Oral HSV 1,, uses oral Valtrex as needed    abdominal pains May 9, 2023 --- ultrasound of the gallbladder was performed Su 15, 2023, ultrasound shows pancreas obscured by bowel gas no evidence of gallstones or cholecystitis, no biliary ductal dilation, nuclear medicine HIDA scan, normal examination Su 15, 2023 Margarito, labs noted normal liver function,    Constipation, treatment options discussed,    Arthritic pain, shoulder pain, will refill ibuprofen  800 mg 3 times daily as needed,    Mixed hyperlipidemia continues  Crestor 20 mg daily,    Coronary artery calcifications  CT scan discussed cardiac risks atherosclerosis etc. changing statins continue, December 16, 2022     Hypothyroidism continues levothyroxine 25 mcg daily    Depression, continues Paxil daily,    Impaired fasting glucose hemoglobin A1c at 6.3, up slightly December 2023     Gastroesophageal reflux disease continues omeprazole 40 mg daily    Hypertension continues lisinopril HCTZ 20-12.5 mg daily,    Pulmonary nodule, 3 mm left lower lobe----CAT scan December 23, 2021, --- CT scan December 14, 2022 showed coronary artery calcifications finding consistent with prior granulomatous disease no new or enlarging pulmonary nodules--- the previously identified 3 mm left lower lobe nodule was not identified December 2022    Vitamin B12 at 262 --dec 2022 --- continues over-the-counter B12 supplements daily, improved, June 2023,    Tobacco use,--smoked total of 37 years, quit about 5 years ago, gained some weight,    Does not want a mammogram at this time, discussed December 2023    Follow Up   Return in about 6 months (around 6/15/2024).  Patient was given instructions and counseling regarding her condition or for health maintenance advice. Please see specific information pulled into the AVS if appropriate.

## 2023-12-21 ENCOUNTER — TELEPHONE (OUTPATIENT)
Dept: INTERNAL MEDICINE | Facility: CLINIC | Age: 58
End: 2023-12-21
Payer: COMMERCIAL

## 2023-12-21 NOTE — TELEPHONE ENCOUNTER
Caller: Gualberto Gloria    Relationship: Self    Best call back number: [phone]    What medication are you requesting: SOMETHING FOR YEAST INFECTION     What are your current symptoms:VAGINAL  ITCHING AND DISCOMFORT     How long have you been experiencing symptoms: A FEW  DAYS     Have you had these symptoms before:    [x] Yes  [] No    Have you been treated for these symptoms before:   [x] Yes  [] No    If a prescription is needed, what is your preferred pharmacy and phone number:    [address] - [phone] [facility] [phone]  [phone]          Additional notes:

## 2023-12-22 ENCOUNTER — TELEPHONE (OUTPATIENT)
Dept: INTERNAL MEDICINE | Facility: CLINIC | Age: 58
End: 2023-12-22

## 2023-12-22 RX ORDER — FLUCONAZOLE 100 MG/1
100 TABLET ORAL DAILY
Qty: 3 TABLET | Refills: 0 | Status: SHIPPED | OUTPATIENT
Start: 2023-12-22

## 2023-12-22 NOTE — TELEPHONE ENCOUNTER
Name: Gualberto Gloria    Relationship: Self    Best Callback Number: [phone]    HUB PROVIDED THE RELAY MESSAGE FROM THE OFFICE   PATIENT VOICED UNDERSTANDING AND HAS NO FURTHER QUESTIONS AT THIS TIME

## 2023-12-22 NOTE — TELEPHONE ENCOUNTER
Caller: Gualberto Gloria    Relationship to patient: Self    Best call back number: [phone]    Patient is needing: REQUESTING UPDATE ON WEIGHT LOSS MEDICATION

## 2024-01-05 ENCOUNTER — TELEMEDICINE (OUTPATIENT)
Dept: INTERNAL MEDICINE | Facility: CLINIC | Age: 59
End: 2024-01-05
Payer: COMMERCIAL

## 2024-01-05 VITALS — BODY MASS INDEX: 32.25 KG/M2 | WEIGHT: 188 LBS

## 2024-01-05 DIAGNOSIS — J01.01 ACUTE RECURRENT MAXILLARY SINUSITIS: Primary | ICD-10-CM

## 2024-01-05 PROCEDURE — 99213 OFFICE O/P EST LOW 20 MIN: CPT | Performed by: NURSE PRACTITIONER

## 2024-01-05 RX ORDER — DOXYCYCLINE HYCLATE 100 MG/1
100 CAPSULE ORAL 2 TIMES DAILY
Qty: 20 CAPSULE | Refills: 0 | Status: SHIPPED | OUTPATIENT
Start: 2024-01-05 | End: 2024-01-15

## 2024-01-05 RX ORDER — FLUCONAZOLE 150 MG/1
TABLET ORAL
Qty: 2 TABLET | Refills: 0 | Status: SHIPPED | OUTPATIENT
Start: 2024-01-05

## 2024-01-05 RX ORDER — ACETAMINOPHEN 500 MG
1 TABLET ORAL DAILY
Qty: 30 CAPSULE | Refills: 0 | Status: SHIPPED | OUTPATIENT
Start: 2024-01-05

## 2024-01-05 RX ORDER — PREDNISONE 20 MG/1
TABLET ORAL
Qty: 10 TABLET | Refills: 0 | Status: SHIPPED | OUTPATIENT
Start: 2024-01-05

## 2024-01-05 NOTE — PROGRESS NOTES
Chief Complaint  Cough and Sore Throat (59 YO FEMALE IS DOING A TELEHEALTH FOR CONGESTION, HEADACHE, COUGH, CHILLS, AND SCRATCHY THROAT FROM POST NASAL DRIP. SHE SAID SHE DOESN'T HAVE A FEVER. HER SYMPTOMS STARTED A FEW DAYS AGO, AND SHE SAID SHE SAW BIPIN LAST TIME SHE HAD THESE SAME SYMPTOMS. )  Subjective    History of Present Illness  Gualberto Gloria is a 58 y.o. female who presents to [facility] at [address] [place] [address] for evaluation via video conferencing visit.  The patient's current location is home at address listed in [place]. You have chosen to receive care through a telehealth visit.  Do you consent to use a video/audio connection for your medical care today? Yes.       She presents for an acute visit for complaint of congestion, headache, cough, chills, postnasal drip and scratchy throat for the past 2-3 days and worsening. She can't breathe through her nose.  Denies fever and chills.  Patient reports she has had the symptoms a month ago and was treated with Rocephin, Augmentin and depo-medrol. She did get a vaginal yeast infection but her symptoms resolved until now.  Patient states she gets sinus infections requiring antibiotics approximately 4 times a year.    Past Medical History:   Diagnosis Date    COPD (chronic obstructive pulmonary disease)     Hyperlipidemia     Hypertension         Past Surgical History:   Procedure Laterality Date    BACK SURGERY      COLONOSCOPY      TUBAL ABDOMINAL LIGATION          No Known Allergies       Current Outpatient Medications:     aspirin 81 MG EC tablet, Take 1 tablet by mouth Every Other Day., Disp: 90 tablet, Rfl: 2    atorvastatin (Lipitor) 20 MG tablet, Take 1 tablet by mouth Daily., Disp: 90 tablet, Rfl: 3    Cholecalciferol (Vitamin D3) 50 MCG (2000 UT) tablet, Take 1 tablet by mouth Every Other Day., Disp: 30 tablet, Rfl: 2    ibuprofen (ADVIL,MOTRIN) 800 MG tablet, Take 1 tablet by mouth Every 6  "(Six) Hours As Needed for Mild Pain., Disp: , Rfl:     ibuprofen (ADVIL,MOTRIN) 800 MG tablet, Take 1 tablet by mouth Every 6 (Six) Hours As Needed for Mild Pain., Disp: 90 tablet, Rfl: 5    levothyroxine (SYNTHROID, LEVOTHROID) 25 MCG tablet, Take 1 tablet by mouth once daily, Disp: 90 tablet, Rfl: 0    lisinopril-hydrochlorothiazide (PRINZIDE,ZESTORETIC) 20-12.5 MG per tablet, Take 1 tablet by mouth Daily., Disp: 90 tablet, Rfl: 3    omeprazole (priLOSEC) 40 MG capsule, Take 1 capsule by mouth once daily, Disp: 90 capsule, Rfl: 0    PARoxetine (PAXIL) 40 MG tablet, TAKE 1 TABLET BY MOUTH ONCE DAILY IN THE MORNING, Disp: 90 tablet, Rfl: 0    valACYclovir (Valtrex) 500 MG tablet, Take 1 tablet by mouth 2 (Two) Times a Day., Disp: 60 tablet, Rfl: 5    doxycycline (VIBRAMYCIN) 100 MG capsule, Take 1 capsule by mouth 2 (Two) Times a Day for 10 days., Disp: 20 capsule, Rfl: 0    fluconazole (Diflucan) 150 MG tablet, Take one tablet once and may repeat in 3 days., Disp: 2 tablet, Rfl: 0    predniSONE (DELTASONE) 20 MG tablet, Take 2 tabs each morning with food for 5 days., Disp: 10 tablet, Rfl: 0    Probiotic, Lactobacillus, capsule, Take 1 capsule by mouth Daily., Disp: 30 capsule, Rfl: 0    Objective   Wt 85.3 kg (188 lb)   BMI 32.25 kg/m²    Estimated body mass index is 32.25 kg/m² as calculated from the following:    Height as of 12/15/23: 162.6 cm (64.02\").    Weight as of this encounter: 85.3 kg (188 lb).     Physical Exam  Constitutional:       General: The patient is not in acute distress.  Pulmonary:      Effort: Pulmonary effort is normal.   Neurological:      General: No focal deficit present.      Mental Status: The patient is alert.   Psychiatric:         Thought Content: Thought content normal.         Judgment: Judgment normal.          Assessment and Plan   Diagnoses and all orders for this visit:    1. Acute recurrent maxillary sinusitis (Primary)    Other orders  -     doxycycline (VIBRAMYCIN) 100 MG " capsule; Take 1 capsule by mouth 2 (Two) Times a Day for 10 days.  Dispense: 20 capsule; Refill: 0  -     predniSONE (DELTASONE) 20 MG tablet; Take 2 tabs each morning with food for 5 days.  Dispense: 10 tablet; Refill: 0  -     Probiotic, Lactobacillus, capsule; Take 1 capsule by mouth Daily.  Dispense: 30 capsule; Refill: 0  -     fluconazole (Diflucan) 150 MG tablet; Take one tablet once and may repeat in 3 days.  Dispense: 2 tablet; Refill: 0      Patient to start doxycycline 100 mg twice a day for 10 days, prednisone 20 mg 2 tablets every morning with food for 5 days; start this tomorrow morning.  Probiotic to prevent GI and vaginal symptoms.  Fluconazole if needed for yeast infection.  Recommended NyQuil to help with drainage and cough at night.  If not improving or worsening the patient was advised to come into the office in the next 2 to 3 days.    Follow Up     Patient was given instructions and counseling regarding her condition. Please see specific information pulled into the AVS if appropriate.   If not improving or worsening the patient was instructed to follow-up.    The visit included audio and video interaction. No technical issues occurred during this visit. The provider spent 10:00 minutes with the patient during the video conferencing visit via [employer] Meet.The following staff were present during the visit: None.    Dictated Utilizing Dragon Dictation.  Please note that portions of this note were completed with a voice recognition program.  Part of this note may be an electronic transcription/translation of spoken language to printed text using the Dragon Dictation System.    ANA LAURA Zacarias

## 2024-01-08 ENCOUNTER — TELEPHONE (OUTPATIENT)
Dept: INTERNAL MEDICINE | Facility: CLINIC | Age: 59
End: 2024-01-08
Payer: COMMERCIAL

## 2024-01-08 NOTE — TELEPHONE ENCOUNTER
Caller: Gualberto Gloria    Relationship to patient: Self    Best call back number: [phone]    Patient is needing: PATIENT CALLED STATING MD RODRIGUEZ HAD BEEN TRYING TO PRESCRIBE WEIGHT LOSS MEDICATION FOR HER BUT HER INSURANCE IS NOT WANTING TO COVER THE MEDICATION THAT WAS PREVIOUSLY CALLED IN. PATIENT STATES SHE CALLED INSURANCE AND WAS INFORMED HER INSURANCE WOULD COVER THE OZEMPIC OR MOUNJARO AND WOULD LIKE TO SEE IF MD RODRIGUEZ WOULD BE WILLING TO CALL IN ONE OR THE OTHER TO [facility].

## 2024-01-09 NOTE — TELEPHONE ENCOUNTER
Left pt vm. Those medications are only approved for type 2 diabetes. She does not have this diagnosis so theres a high chance this will be denied but I will put the PA in. RELAY

## 2024-01-09 NOTE — TELEPHONE ENCOUNTER
PATIENT CALLED STATING THE PHARMACY LET HER KNOW A PRIOR AUTHORIZATION IS NEEDED FOR THE MEDICATION

## 2024-01-10 NOTE — TELEPHONE ENCOUNTER
I have spoken to pt and she said she is enrolling in the weight loss program and will let me know when that is done so we can try and get one of the weight loss medications approved

## 2024-01-10 NOTE — TELEPHONE ENCOUNTER
Name: DarielnoelGualberto    Relationship: Self    Best Callback Number: [phone]     HUB PROVIDED THE RELAY MESSAGE FROM THE OFFICE   PATIENT HAS FURTHER QUESTIONS AND WOULD LIKE A CALL BACK AT THE FOLLOWING PHONE NUMBER [phone]    ADDITIONAL INFORMATION: PATIENT WOULD LIKE TO KNOW IF THERE IS ANY WEIGHT LOSS MEDICATION THAT SHE COULD TAKE THAT WOULD NOT REQUIRE A PA. PLEASE ADVISE

## 2024-01-11 NOTE — TELEPHONE ENCOUNTER
Caller: Gualberto Gloria    Relationship to patient: Self    Best call back number: [phone]    Patient is needing: PATIENT CALLED REQUESTING TO SPEAK WITH EARNESTINE. PATIENT STATES SHE DID ENROLL IN THE WEIGHT LOSS PROGRAM AND IS WANTING TO SEE ABOUT GETTING EITHER SAXENDA OR ELICEO CALLED IN FOR HER BUT WILL TRY ANYTHING THAT IS NOT ON BACK ORDER. PATIENT STATES THE MEDICATION CAN NOT BE CALLED IN UNTIL TUESDAY SO THAT THE WEIGHT LOSS PROGRAM SHE SIGNED UP THROUGH INSURANCE CAN GO THROUGH. PATIENT STATES A MESSAGE CAN BE LEFT ON HER HOUSE PHONE IF UNABLE TO ANSWER.

## 2024-01-22 ENCOUNTER — TELEPHONE (OUTPATIENT)
Dept: INTERNAL MEDICINE | Facility: CLINIC | Age: 59
End: 2024-01-22

## 2024-01-22 NOTE — TELEPHONE ENCOUNTER
Caller: Gualberto Gloria    Relationship: Self    Best call back number: [phone]     Requested Prescriptions:      [facility]     Pharmacy where request should be sent: [facility] [address] [place] [address] [place] - [address] [facility] [phone] [facility] [phone] FX     Last office visit with prescribing clinician: 12/15/2023   Last telemedicine visit with prescribing clinician: 1/5/2024   Next office visit with prescribing clinician: 6/13/2024     Additional details provided by patient: PATIENT STATED INSURANCE HAS APPROVED MEDICATION     Does the patient have less than a 3 day supply:  [x] Yes  [] No    Would you like a call back once the refill request has been completed: [x] Yes [] No    If the office needs to give you a call back, can they leave a voicemail: [x] Yes [] No    Yanna Samuels Rep   01/22/24 12:16 EST

## 2024-01-23 NOTE — TELEPHONE ENCOUNTER
Caller: Gualberto Gloria    Relationship: Self    Best call back number: [phone]     What is the best time to reach you: ANY    Who are you requesting to speak with (clinical staff, provider,  specific staff member): CLINICAL STAFF    What was the call regarding: PATIENT CALLED BACK STATING THAT IT WOULD BE A NEW PRESCRIPTION AS SHE HAS NEVER TAKEN IT BEFORE

## 2024-02-06 ENCOUNTER — LAB (OUTPATIENT)
Dept: INTERNAL MEDICINE | Facility: CLINIC | Age: 59
End: 2024-02-06
Payer: COMMERCIAL

## 2024-02-06 DIAGNOSIS — E53.8 VITAMIN B12 DEFICIENCY: ICD-10-CM

## 2024-02-06 DIAGNOSIS — F32.89 OTHER DEPRESSION: ICD-10-CM

## 2024-02-06 DIAGNOSIS — K22.70 BARRETT'S ESOPHAGUS WITHOUT DYSPLASIA: ICD-10-CM

## 2024-02-06 DIAGNOSIS — E03.8 HYPOTHYROIDISM DUE TO HASHIMOTO'S THYROIDITIS: ICD-10-CM

## 2024-02-06 DIAGNOSIS — E55.9 VITAMIN D DEFICIENCY: Primary | ICD-10-CM

## 2024-02-06 DIAGNOSIS — E06.3 HYPOTHYROIDISM DUE TO HASHIMOTO'S THYROIDITIS: ICD-10-CM

## 2024-02-06 DIAGNOSIS — N18.31 STAGE 3A CHRONIC KIDNEY DISEASE: ICD-10-CM

## 2024-02-06 DIAGNOSIS — R73.01 IFG (IMPAIRED FASTING GLUCOSE): ICD-10-CM

## 2024-02-06 DIAGNOSIS — E78.2 MIXED HYPERLIPIDEMIA: ICD-10-CM

## 2024-02-06 DIAGNOSIS — M19.041 PRIMARY OSTEOARTHRITIS OF BOTH HANDS: ICD-10-CM

## 2024-02-06 DIAGNOSIS — I10 ESSENTIAL HYPERTENSION: ICD-10-CM

## 2024-02-06 DIAGNOSIS — M19.042 PRIMARY OSTEOARTHRITIS OF BOTH HANDS: ICD-10-CM

## 2024-02-06 LAB
CHOLEST SERPL-MCNC: 186 MG/DL (ref 0–200)
HDLC SERPL-MCNC: 64 MG/DL (ref 40–60)
LDLC SERPL CALC-MCNC: 105 MG/DL (ref 0–100)
LDLC/HDLC SERPL: 1.61 {RATIO}
TRIGL SERPL-MCNC: 95 MG/DL (ref 0–150)
VLDLC SERPL-MCNC: 17 MG/DL (ref 5–40)

## 2024-02-06 PROCEDURE — 80061 LIPID PANEL: CPT | Performed by: INTERNAL MEDICINE

## 2024-02-06 PROCEDURE — 36415 COLL VENOUS BLD VENIPUNCTURE: CPT | Performed by: INTERNAL MEDICINE

## 2024-02-07 DIAGNOSIS — I10 ESSENTIAL HYPERTENSION: ICD-10-CM

## 2024-02-07 DIAGNOSIS — E03.8 OTHER SPECIFIED HYPOTHYROIDISM: ICD-10-CM

## 2024-02-07 RX ORDER — LISINOPRIL AND HYDROCHLOROTHIAZIDE 20; 12.5 MG/1; MG/1
1 TABLET ORAL DAILY
Qty: 90 TABLET | Refills: 0 | Status: SHIPPED | OUTPATIENT
Start: 2024-02-07

## 2024-02-07 RX ORDER — LEVOTHYROXINE SODIUM 0.03 MG/1
TABLET ORAL
Qty: 90 TABLET | Refills: 0 | Status: SHIPPED | OUTPATIENT
Start: 2024-02-07

## 2024-02-16 ENCOUNTER — TELEPHONE (OUTPATIENT)
Dept: INTERNAL MEDICINE | Facility: CLINIC | Age: 59
End: 2024-02-16
Payer: COMMERCIAL

## 2024-02-16 DIAGNOSIS — M25.50 ARTHRALGIA, UNSPECIFIED JOINT: Primary | ICD-10-CM

## 2024-02-16 NOTE — TELEPHONE ENCOUNTER
Caller: Gualberto Gloria    Relationship: Self    Best call back number: [phone]     What medication are you requesting: ANTI-INFLAMMATORY     What are your current symptoms: ENFLAMED HANDS/FEET    How long have you been experiencing symptoms: A FEW DAYS    Have you had these symptoms before:    [x] Yes  [] No    Have you been treated for these symptoms before:   [x] Yes  [] No    If a prescription is needed, what is your preferred pharmacy and phone number: [facility] [address] [place] [address] [phone] [facility] [phone]

## 2024-02-22 NOTE — TELEPHONE ENCOUNTER
Hub staff attempted to follow warm transfer process and was unsuccessful     Caller: Gualberto Gloria    Relationship to patient: Self    Best call back number:     [phone]     Patient is needing: PATIENT WAS TRYING TO RETURN THIS CALL.    PATIENT STATES THAT SHE HAS BEEN TAKING IBUPROFEN 800 MG BUT THAT IT MAKES HER TIRED SO SHE HAS ONLY BEEN TAKING IT AT NIGHT. PATIENT STATES THAT SHE IS NOT SURE IF IBUPROFEN IS ANTIINFLAMMATORY OR NOT.     PATIENT STATES THAT SHE SEES DR BEEN SOON FOR SHOTS. PATIENT STATES THAT SHE WOULD LIKE TO KNOW IF THERE IS ANYTHING THAT WILL HELP MORE THAN THE IBUPROFEN.     PATIENT STATES THAT SHE WOULD LIKE A CALL BACK AROUND 11:30 TODAY IF POSSIBLE.

## 2024-02-23 RX ORDER — MELOXICAM 15 MG/1
15 TABLET ORAL DAILY
Qty: 30 TABLET | Refills: 5 | Status: SHIPPED | OUTPATIENT
Start: 2024-02-23

## 2024-02-23 NOTE — TELEPHONE ENCOUNTER
Pt would like the Mobic sent to pharmacy, if that doesn't help, pt will call office set up a visit.

## 2024-02-23 NOTE — TELEPHONE ENCOUNTER
Problem: Safety  Goal: Free from accidental injury  Outcome: PROGRESSING AS EXPECTED     Problem: Knowledge Deficit  Goal: Patient/Significant other demonstrates understanding of disease process, treatment plan, medications and discharge instructions  Outcome: PROGRESSING AS EXPECTED     Problem: Pain  Goal: Alleviation of Pain or a reduction in pain to the patient's comfort goal  Outcome: PROGRESSING AS EXPECTED      Pt is asking for something for her joint pain, hands, shoulders and feet. The ibuprofen 800 mg ( taken at night) really makes her sleepy. Also, she has a upcoming with Dr. Womack this Wednesday for a streroid injection for the hands. Please advise.

## 2024-02-25 DIAGNOSIS — I10 ESSENTIAL HYPERTENSION: ICD-10-CM

## 2024-02-26 RX ORDER — LISINOPRIL AND HYDROCHLOROTHIAZIDE 20; 12.5 MG/1; MG/1
1 TABLET ORAL DAILY
Qty: 90 TABLET | Refills: 0 | Status: SHIPPED | OUTPATIENT
Start: 2024-02-26

## 2024-02-26 RX ORDER — OMEPRAZOLE 40 MG/1
40 CAPSULE, DELAYED RELEASE ORAL DAILY
Qty: 90 CAPSULE | Refills: 0 | Status: SHIPPED | OUTPATIENT
Start: 2024-02-26

## 2024-02-28 ENCOUNTER — OFFICE VISIT (OUTPATIENT)
Dept: ORTHOPEDIC SURGERY | Facility: CLINIC | Age: 59
End: 2024-02-28
Payer: COMMERCIAL

## 2024-02-28 VITALS
HEART RATE: 70 BPM | WEIGHT: 188 LBS | DIASTOLIC BLOOD PRESSURE: 75 MMHG | HEIGHT: 64 IN | SYSTOLIC BLOOD PRESSURE: 115 MMHG | BODY MASS INDEX: 32.1 KG/M2 | OXYGEN SATURATION: 96 %

## 2024-02-28 DIAGNOSIS — M18.0 ARTHRITIS OF CARPOMETACARPAL (CMC) JOINT OF BOTH THUMBS: Primary | ICD-10-CM

## 2024-02-28 DIAGNOSIS — M79.642 BILATERAL HAND PAIN: ICD-10-CM

## 2024-02-28 DIAGNOSIS — M79.641 BILATERAL HAND PAIN: ICD-10-CM

## 2024-02-28 RX ORDER — TRIAMCINOLONE ACETONIDE 40 MG/ML
40 INJECTION, SUSPENSION INTRA-ARTICULAR; INTRAMUSCULAR
Status: COMPLETED | OUTPATIENT
Start: 2024-02-28 | End: 2024-02-28

## 2024-02-28 RX ORDER — LIDOCAINE HYDROCHLORIDE 10 MG/ML
1 INJECTION, SOLUTION INFILTRATION; PERINEURAL
Status: COMPLETED | OUTPATIENT
Start: 2024-02-28 | End: 2024-02-28

## 2024-02-28 RX ADMIN — TRIAMCINOLONE ACETONIDE 40 MG: 40 INJECTION, SUSPENSION INTRA-ARTICULAR; INTRAMUSCULAR at 09:30

## 2024-02-28 RX ADMIN — LIDOCAINE HYDROCHLORIDE 1 ML: 10 INJECTION, SOLUTION INFILTRATION; PERINEURAL at 09:30

## 2024-02-28 NOTE — PROGRESS NOTES
"Chief Complaint  Follow-up of the Right Hand and Follow-up of the Left Hand     Subjective      Gualberto Gloria presents to [facility] for follow up of bilateral hands. She has arthritis and pain at the base of her thumb. She has pain with FMC and  strength. She has not braced or tried topical creams at this time. She had injections on 23 that gave some relief.     No Known Allergies     Social History     Socioeconomic History    Marital status:    Tobacco Use    Smoking status: Former     Packs/day: 1.00     Years: 37.00     Additional pack years: 0.00     Total pack years: 37.00     Types: Cigarettes     Quit date: 2017     Years since quittin.2    Smokeless tobacco: Never   Vaping Use    Vaping Use: Never used   Substance and Sexual Activity    Alcohol use: Not Currently    Drug use: Never    Sexual activity: Defer        I reviewed the patient's chief complaint, history of present illness, review of systems, past medical history, surgical history, family history, social history, medications, and allergy list.     Review of Systems     Constitutional: Denies fevers, chills, weight loss  Cardiovascular: Denies chest pain, shortness of breath  Skin: Denies rashes, acute skin changes  Neurologic: Denies headache, loss of consciousness        Vital Signs:   /75   Pulse 70   Ht 162.6 cm (64.02\")   Wt 85.3 kg (188 lb)   SpO2 96%   BMI 32.25 kg/m²          Physical Exam  General: Alert. No acute distress    Ortho Exam        BILATERAL HANDS Negative Compression testing/ Negative Tinels. NegativeFinkelsteins. Negative Mcmanus's testing. Positive CMC grind testing. Negative Phalens. Full ROM of the hand, fingers, elbow and wrist. Negative Triggering of the digit. Sensation grossly intact to light touch, median, radial and ulnar nerve. Positive AIN, PIN and ulnar nerve motor function intact. Axillary nerve intact. Positive pulses      Small Joint " Arthrocentesis: R thumb CMC  Consent given by: patient  Site marked: site marked  Timeout: Immediately prior to procedure a time out was called to verify the correct patient, procedure, equipment, support staff and site/side marked as required   Supporting Documentation  Indications: pain   Procedure Details  Location: thumb - R thumb CMC  Preparation: Patient was prepped and draped in the usual sterile fashion  Needle gauge: 23G.  Medications administered: 1 mL lidocaine 1 %; 40 mg triamcinolone acetonide 40 MG/ML  Patient tolerance: patient tolerated the procedure well with no immediate complications      Small Joint Arthrocentesis: L thumb CMC  Consent given by: patient  Site marked: site marked  Timeout: Immediately prior to procedure a time out was called to verify the correct patient, procedure, equipment, support staff and site/side marked as required   Supporting Documentation  Indications: pain   Procedure Details  Location: thumb - L thumb CMC  Preparation: Patient was prepped and draped in the usual sterile fashion  Needle gauge: 23G.  Medications administered: 1 mL lidocaine 1 %; 40 mg triamcinolone acetonide 40 MG/ML  Patient tolerance: patient tolerated the procedure well with no immediate complications          Imaging Results (Most Recent)       None             Result Review :          Assessment and Plan     Diagnoses and all orders for this visit:    1. Arthritis of carpometacarpal (CMC) joint of both thumbs (Primary)    2. Bilateral hand pain        Discussed the treatment plan with the patient.         Discussed the risks and benefits of conservative measures.  The patient expressed understanding and wished to proceed with bilateral thumb CMC injection.  She tolerated the injection well.       Call or return if worsening symptoms.    Follow Up     PRN      Patient was given instructions and counseling regarding her condition or for health maintenance advice. Please see specific information  pulled into the AVS if appropriate.     Scribed for Cody Womack MD by Cornelia Mitchell MA.  02/28/24   09:22 EST    I have personally performed the services described in this document as scribed by the above individual and it is both accurate and complete. Cody Womack MD 02/28/24

## 2024-03-08 ENCOUNTER — TELEPHONE (OUTPATIENT)
Dept: ORTHOPEDIC SURGERY | Facility: CLINIC | Age: 59
End: 2024-03-08
Payer: COMMERCIAL

## 2024-03-08 NOTE — TELEPHONE ENCOUNTER
PATIENT STATES HER RIGHT THUMB IS STILL VERY PAINFUL FOLLOWING R  CMC INJECTION ON 02/28/24 IN THE OFFICE. SHE STATES SHE HAS TRIED ICE, HEAT, AND NSAIDS WITH NO RELIEF. I LET PATIENT KNOW THAT WE CAN BRING HER IN FOR AN APPT FOR REEVALUATION SINCE SHE IS HAVING ISSUES. SHE STATES SHE DOES NOT WANT TO SEE DR. SAUCEDO BECAUSE HE IS RUDE AND ACTED LIKE HE DID NOT HAVE TIME FOR HER. I TOLD HER SHE COULD SEE ONE OF HIS NURSE PRACTITIONERS INSTEAD BUT SHE BEGAN YELLING THAT SHE DOES NOT SEE WHY SHE HAS TO WASTE MONEY TO COME IN HERE FOR AN APPOINTMENT AGAIN AND SHE WILL JUST STAY IN PAIN. I TRIED TO EXPLAIN THAT WHEN PATIENTS HAVE ISSUES OR INCREASED PAIN FOLLOWING INJECTIONS AND HAVE TRIED NSAIDS, ANALGESICS, ICE, GENTLE ROM, AND REST WITH NO RELIEF WE LIKE TO RE-EVALUATE THE ISSUE. PATIENT HUNG UP THE PHONE ON ME WHILE ATTEMPTING TO EXPLAIN.

## 2024-03-12 ENCOUNTER — OFFICE VISIT (OUTPATIENT)
Dept: INTERNAL MEDICINE | Age: 59
End: 2024-03-12
Payer: COMMERCIAL

## 2024-03-12 VITALS
SYSTOLIC BLOOD PRESSURE: 100 MMHG | HEIGHT: 65 IN | OXYGEN SATURATION: 94 % | WEIGHT: 190 LBS | RESPIRATION RATE: 18 BRPM | HEART RATE: 60 BPM | BODY MASS INDEX: 31.65 KG/M2 | DIASTOLIC BLOOD PRESSURE: 64 MMHG | TEMPERATURE: 98.7 F

## 2024-03-12 DIAGNOSIS — I10 PRIMARY HYPERTENSION: ICD-10-CM

## 2024-03-12 DIAGNOSIS — M79.641 RIGHT HAND PAIN: ICD-10-CM

## 2024-03-12 DIAGNOSIS — M18.0 ARTHRITIS OF CARPOMETACARPAL (CMC) JOINT OF BOTH THUMBS: Primary | ICD-10-CM

## 2024-03-12 PROCEDURE — 96372 THER/PROPH/DIAG INJ SC/IM: CPT

## 2024-03-12 PROCEDURE — 99214 OFFICE O/P EST MOD 30 MIN: CPT

## 2024-03-12 RX ORDER — KETOROLAC TROMETHAMINE 30 MG/ML
30 INJECTION, SOLUTION INTRAMUSCULAR; INTRAVENOUS EVERY 6 HOURS PRN
Status: SHIPPED | OUTPATIENT
Start: 2024-03-12 | End: 2024-03-17

## 2024-03-12 RX ORDER — PREDNISONE 20 MG/1
TABLET ORAL
Qty: 12 TABLET | Refills: 0 | Status: SHIPPED | OUTPATIENT
Start: 2024-03-12 | End: 2024-03-20

## 2024-03-12 RX ADMIN — KETOROLAC TROMETHAMINE 30 MG: 30 INJECTION, SOLUTION INTRAMUSCULAR; INTRAVENOUS at 13:54

## 2024-03-12 NOTE — PROGRESS NOTES
Chief Complaint  Hand Pain (58 year old female here today complaining of right thumb pain. States she has a history of arthritis. States the end of Feb she seen Dr Womack and received steroid injections in both hands. States the right one did not work and her hand has not stopped hurting)    History of Present Illness  SUBJECTIVE  Gualberto Gloria presents to [facility] to discuss hand pain.      Past Medical History:   Diagnosis Date    COPD (chronic obstructive pulmonary disease)     Hyperlipidemia     Hypertension       Family History   Problem Relation Age of Onset    Diabetes Mother     Heart disease Mother       Past Surgical History:   Procedure Laterality Date    BACK SURGERY      COLONOSCOPY      TUBAL ABDOMINAL LIGATION          Current Outpatient Medications:     aspirin 81 MG EC tablet, Take 1 tablet by mouth Every Other Day., Disp: 90 tablet, Rfl: 2    atorvastatin (Lipitor) 20 MG tablet, Take 1 tablet by mouth Daily., Disp: 90 tablet, Rfl: 3    Cholecalciferol (Vitamin D3) 50 MCG (2000 UT) tablet, Take 1 tablet by mouth Every Other Day., Disp: 30 tablet, Rfl: 2    ibuprofen (ADVIL,MOTRIN) 800 MG tablet, Take 1 tablet by mouth Every 6 (Six) Hours As Needed for Mild Pain., Disp: , Rfl:     ibuprofen (ADVIL,MOTRIN) 800 MG tablet, Take 1 tablet by mouth Every 6 (Six) Hours As Needed for Mild Pain., Disp: 90 tablet, Rfl: 5    levothyroxine (SYNTHROID, LEVOTHROID) 25 MCG tablet, Take 1 tablet by mouth once daily, Disp: 90 tablet, Rfl: 0    lisinopril-hydrochlorothiazide (PRINZIDE,ZESTORETIC) 20-12.5 MG per tablet, Take 1 tablet by mouth once daily, Disp: 90 tablet, Rfl: 0    meloxicam (Mobic) 15 MG tablet, Take 1 tablet by mouth Daily., Disp: 30 tablet, Rfl: 5    omeprazole (priLOSEC) 40 MG capsule, Take 1 capsule by mouth once daily, Disp: 90 capsule, Rfl: 0    PARoxetine (PAXIL) 40 MG tablet, TAKE 1 TABLET BY MOUTH ONCE DAILY IN THE MORNING, Disp: 90 tablet, Rfl: 0     "valACYclovir (Valtrex) 500 MG tablet, Take 1 tablet by mouth 2 (Two) Times a Day., Disp: 60 tablet, Rfl: 5    Diclofenac Sodium (VOLTAREN) 1 % gel gel, Apply 4 g topically to the appropriate area as directed 4 (Four) Times a Day As Needed (hand pain)., Disp: 150 g, Rfl: 1    Liraglutide (SAXENDA) 18 MG/3ML injection pen, Inject 0.6mg under skin daily for week one, THEN 1.2mg daily for week two, THEN 1.8mg daily for week three, then 2.4mg daily for week four. (Patient not taking: Reported on 3/12/2024), Disp: 3 mL, Rfl: 1    predniSONE (DELTASONE) 20 MG tablet, Take 2 tablets by mouth Daily for 4 days, THEN 1 tablet Daily for 4 days., Disp: 12 tablet, Rfl: 0    Semaglutide-Weight Management 0.25 MG/0.5ML solution auto-injector, Inject 0.25 mg under the skin into the appropriate area as directed 1 (One) Time Per Week. (Patient not taking: Reported on 3/12/2024), Disp: 2 mL, Rfl: 5    Tirzepatide (MOUNJARO) 2.5 MG/0.5ML solution pen-injector pen, Inject 0.5 mL under the skin into the appropriate area as directed 1 (One) Time Per Week. (Patient not taking: Reported on 3/12/2024), Disp: 2 mL, Rfl: 5    OBJECTIVE  Vital Signs:   /64 (BP Location: Left arm, Patient Position: Sitting)   Pulse 60   Temp 98.7 °F (37.1 °C) (Temporal)   Resp 18   Ht 165.2 cm (65.02\")   Wt 86.2 kg (190 lb)   SpO2 94%   BMI 31.60 kg/m²    Estimated body mass index is 31.6 kg/m² as calculated from the following:    Height as of this encounter: 165.2 cm (65.02\").    Weight as of this encounter: 86.2 kg (190 lb).     Wt Readings from Last 3 Encounters:   03/12/24 86.2 kg (190 lb)   02/28/24 85.3 kg (188 lb)   01/05/24 85.3 kg (188 lb)     BP Readings from Last 3 Encounters:   03/12/24 100/64   02/28/24 115/75   12/15/23 111/77       Physical Exam  Vitals and nursing note reviewed.   Constitutional:       Appearance: Normal appearance.   HENT:      Head: Normocephalic.   Eyes:      Extraocular Movements: Extraocular movements intact.     "  Conjunctiva/sclera: Conjunctivae normal.   Cardiovascular:      Rate and Rhythm: Normal rate and regular rhythm.   Pulmonary:      Effort: Pulmonary effort is normal.   Abdominal:      General: Bowel sounds are normal.      Palpations: Abdomen is soft.   Musculoskeletal:         General: No swelling.      Right hand: Tenderness and bony tenderness present. Decreased range of motion.      Cervical back: Normal range of motion and neck supple.   Skin:     General: Skin is warm and dry.   Neurological:      General: No focal deficit present.      Mental Status: She is alert. Mental status is at baseline.   Psychiatric:         Mood and Affect: Mood normal.         Thought Content: Thought content normal.          Result Review        No Images in the past 120 days found..     The above data has been reviewed by ANA LAURA Andersen 03/12/2024 13:11 EDT.          Patient Care Team:  Jaspal Gutierrez MD as PCP - General (Internal Medicine)            ASSESSMENT & PLAN    Diagnoses and all orders for this visit:    1. Arthritis of carpometacarpal (CMC) joint of both thumbs (Primary)  Assessment & Plan:  She has injections completed in carpometacarpal joint of both thumbs a few weeks ago.  She states that the right hand is worse. She states that it feels like a constant toothache. She states that it is throbbing.   She states that her left hand is better.  She is taking motrin and is not getting much relief.   She states she reached out to ortho but she states they could not do much for her.  We will refer her to hand specialist.  She may apply diclofenac gel. Will give short round of prednisone. Reviewed A1c and it has been controlled.  We will also give toradol injection today-she was instructed not to take any more NSAIDS today. Patient acknowledges understanding and is agreeable with plan       Orders:  -     predniSONE (DELTASONE) 20 MG tablet; Take 2 tablets by mouth Daily for 4 days, THEN 1 tablet Daily for 4  days.  Dispense: 12 tablet; Refill: 0  -     Cancel: Ambulatory Referral to Hand Surgery  -     Diclofenac Sodium (VOLTAREN) 1 % gel gel; Apply 4 g topically to the appropriate area as directed 4 (Four) Times a Day As Needed (hand pain).  Dispense: 150 g; Refill: 1  -     Ambulatory Referral to Hand Surgery    2. Right hand pain  -     Ambulatory Referral to Hand Surgery    3. Primary hypertension  Assessment & Plan:  Blood pressure is well controlled  Continue current medication regimen.           Tobacco Use: Medium Risk (3/12/2024)    Patient History     Smoking Tobacco Use: Former     Smokeless Tobacco Use: Never     Passive Exposure: Not on file       Follow Up     Return if symptoms worsen or fail to improve.    Please note that portions of this note were completed with a voice recognition program.    Patient was given instructions and counseling regarding her condition or for health maintenance advice. Please see specific information pulled into the AVS if appropriate.   I have reviewed information obtained and documented by others and I have confirmed the accuracy of this documented note.    ANA LAURA Andersen

## 2024-03-12 NOTE — ASSESSMENT & PLAN NOTE
She has injections completed in carpometacarpal joint of both thumbs a few weeks ago.  She states that the right hand is worse. She states that it feels like a constant toothache. She states that it is throbbing.   She states that her left hand is better.  She is taking motrin and is not getting much relief.   She states she reached out to ortho but she states they could not do much for her.  We will refer her to hand specialist.  She may apply diclofenac gel. Will give short round of prednisone. Reviewed A1c and it has been controlled.  We will also give toradol injection today-she was instructed not to take any more NSAIDS today. Patient acknowledges understanding and is agreeable with plan

## 2024-03-18 ENCOUNTER — TELEPHONE (OUTPATIENT)
Dept: ORTHOPEDIC SURGERY | Facility: CLINIC | Age: 59
End: 2024-03-18
Payer: COMMERCIAL

## 2024-03-18 ENCOUNTER — TELEPHONE (OUTPATIENT)
Dept: INTERNAL MEDICINE | Age: 59
End: 2024-03-18
Payer: COMMERCIAL

## 2024-03-18 ENCOUNTER — TELEPHONE (OUTPATIENT)
Dept: INTERNAL MEDICINE | Age: 59
End: 2024-03-18

## 2024-03-18 NOTE — TELEPHONE ENCOUNTER
Caller: Gualberto Gloria    Relationship: Self    Best call back number: [phone]     What specialty or service is being requested: HAND SPECIALTY    What is the provider, practice or medical service name: DR. LAGUNAS OR ANY PROVIDER IN [facility].     Any additional details: STATED THE PROVIDER SHE WAS REFERRED TO IS RETIRED. PLEASE CALL AND ADVISE.

## 2024-03-18 NOTE — TELEPHONE ENCOUNTER
Looks like meloxicam 15 mg qday is already on her medication list-does she need a refill? He sent in 5 refills in February? She can use the mobic but she cannot take it with any other nsaids-ibuprofen, motrin, etc.

## 2024-03-18 NOTE — TELEPHONE ENCOUNTER
You can put a referral in for whoever she like to see Dr. Woodard or any of the orthopedic doctors in [facility] I am not sure which ones are hand specialists that I know of but anyone that she wants is fine

## 2024-03-18 NOTE — TELEPHONE ENCOUNTER
PATIENT CALLED DR FINN IS RETIRED WE NEED TO SEND REFERRAL TO A NEW DR WHO CAN DO HAND SURGERY FOR PATIENT

## 2024-03-18 NOTE — TELEPHONE ENCOUNTER
Patient called inquiring on status of referral to hand surgeon that Etelvina Cardoso had referred her to.   Advised this was faxed,and patient given the hand surgeon phone number so she can call and make her appt.    She is also asking for a script of Mobleah which Dr. Campos had given her some time back. She states still having lots of pain in her hand and she is taking iburpofen 800 mg q6h right now but it's not really helping.  Would like the Mobic script she had before as she stated this really helped her.  Uses [facility] pharmacy in [facility].    Dr. Campos had written in Oct 23 for Mobic 7.5 mg, 2 po QD w/food x 10 days, then 1 po QD w/food x 10 days.     Would like a call if Etelvina is ok to send this in for her.     [phone]          Advised patient of Etelvina's reply that there are refills still available. Patient voiced understanding.     [facility] Rep

## 2024-03-19 ENCOUNTER — TELEPHONE (OUTPATIENT)
Dept: INTERNAL MEDICINE | Age: 59
End: 2024-03-19

## 2024-03-19 NOTE — TELEPHONE ENCOUNTER
Caller: Gualberto Gloria    Relationship to patient: Self    Best call back number: [phone]     Patient is needing: PATIENT IS REQUESTING AN APPOINTMENT WITH . HE DOESN'T HAVE ANYTHING AVAILABLE UNTIL NEXT TUESDAY. PATIENT SAID SHE CAN'T WAIT THAT LONG. PLEASE CALL AND ADVISE.  I TRIED TO GT HER WITH SOMEONE ELSE BUT EVERYONE WAS BOOKED OUT UNTIL 03/26.

## 2024-03-20 ENCOUNTER — TELEPHONE (OUTPATIENT)
Dept: INTERNAL MEDICINE | Age: 59
End: 2024-03-20
Payer: COMMERCIAL

## 2024-03-20 ENCOUNTER — HOSPITAL ENCOUNTER (OUTPATIENT)
Dept: GENERAL RADIOLOGY | Facility: HOSPITAL | Age: 59
Discharge: HOME | End: 2024-03-20
Admitting: INTERNAL MEDICINE
Payer: COMMERCIAL

## 2024-03-20 ENCOUNTER — OFFICE VISIT (OUTPATIENT)
Dept: INTERNAL MEDICINE | Age: 59
End: 2024-03-20
Payer: COMMERCIAL

## 2024-03-20 VITALS
BODY MASS INDEX: 31.64 KG/M2 | HEIGHT: 65 IN | DIASTOLIC BLOOD PRESSURE: 71 MMHG | OXYGEN SATURATION: 93 % | TEMPERATURE: 97.7 F | HEART RATE: 81 BPM | SYSTOLIC BLOOD PRESSURE: 103 MMHG | WEIGHT: 189.9 LBS

## 2024-03-20 DIAGNOSIS — M18.0 ARTHRITIS OF CARPOMETACARPAL (CMC) JOINT OF BOTH THUMBS: Primary | ICD-10-CM

## 2024-03-20 DIAGNOSIS — R91.8 INDETERMINATE PULMONARY NODULES: Primary | ICD-10-CM

## 2024-03-20 DIAGNOSIS — I10 PRIMARY HYPERTENSION: ICD-10-CM

## 2024-03-20 DIAGNOSIS — M18.0 ARTHRITIS OF CARPOMETACARPAL (CMC) JOINT OF BOTH THUMBS: ICD-10-CM

## 2024-03-20 PROBLEM — R05.9 COUGH: Status: RESOLVED | Noted: 2023-10-31 | Resolved: 2024-03-20

## 2024-03-20 PROBLEM — M54.2 NECK PAIN, ACUTE: Status: RESOLVED | Noted: 2023-10-31 | Resolved: 2024-03-20

## 2024-03-20 PROBLEM — J01.40 ACUTE NON-RECURRENT PANSINUSITIS: Status: RESOLVED | Noted: 2023-12-04 | Resolved: 2024-03-20

## 2024-03-20 LAB
BASOPHILS # BLD AUTO: 0.04 10*3/MM3 (ref 0–0.2)
BASOPHILS NFR BLD AUTO: 0.7 % (ref 0–1.5)
CRP SERPL-MCNC: <0.3 MG/DL (ref 0–0.5)
DEPRECATED RDW RBC AUTO: 44.2 FL (ref 37–54)
EOSINOPHIL # BLD AUTO: 0.13 10*3/MM3 (ref 0–0.4)
EOSINOPHIL NFR BLD AUTO: 2.2 % (ref 0.3–6.2)
ERYTHROCYTE [DISTWIDTH] IN BLOOD BY AUTOMATED COUNT: 13 % (ref 12.3–15.4)
ERYTHROCYTE [SEDIMENTATION RATE] IN BLOOD: 11 MM/HR (ref 0–30)
HCT VFR BLD AUTO: 37.9 % (ref 34–46.6)
HGB BLD-MCNC: 13 G/DL (ref 12–15.9)
IMM GRANULOCYTES # BLD AUTO: 0.01 10*3/MM3 (ref 0–0.05)
IMM GRANULOCYTES NFR BLD AUTO: 0.2 % (ref 0–0.5)
LYMPHOCYTES # BLD AUTO: 1.79 10*3/MM3 (ref 0.7–3.1)
LYMPHOCYTES NFR BLD AUTO: 30.5 % (ref 19.6–45.3)
MCH RBC QN AUTO: 31.9 PG (ref 26.6–33)
MCHC RBC AUTO-ENTMCNC: 34.3 G/DL (ref 31.5–35.7)
MCV RBC AUTO: 92.9 FL (ref 79–97)
MONOCYTES # BLD AUTO: 0.52 10*3/MM3 (ref 0.1–0.9)
MONOCYTES NFR BLD AUTO: 8.9 % (ref 5–12)
NEUTROPHILS NFR BLD AUTO: 3.38 10*3/MM3 (ref 1.7–7)
NEUTROPHILS NFR BLD AUTO: 57.5 % (ref 42.7–76)
NRBC BLD AUTO-RTO: 0 /100 WBC (ref 0–0.2)
PLATELET # BLD AUTO: 251 10*3/MM3 (ref 140–450)
PMV BLD AUTO: 9.8 FL (ref 6–12)
RBC # BLD AUTO: 4.08 10*6/MM3 (ref 3.77–5.28)
URATE SERPL-MCNC: 5.4 MG/DL (ref 2.4–5.7)
WBC NRBC COR # BLD AUTO: 5.87 10*3/MM3 (ref 3.4–10.8)

## 2024-03-20 PROCEDURE — 85652 RBC SED RATE AUTOMATED: CPT | Performed by: INTERNAL MEDICINE

## 2024-03-20 PROCEDURE — 85025 COMPLETE CBC W/AUTO DIFF WBC: CPT | Performed by: INTERNAL MEDICINE

## 2024-03-20 PROCEDURE — 84550 ASSAY OF BLOOD/URIC ACID: CPT | Performed by: INTERNAL MEDICINE

## 2024-03-20 PROCEDURE — 86140 C-REACTIVE PROTEIN: CPT | Performed by: INTERNAL MEDICINE

## 2024-03-20 PROCEDURE — 73130 X-RAY EXAM OF HAND: CPT

## 2024-03-20 PROCEDURE — 99214 OFFICE O/P EST MOD 30 MIN: CPT | Performed by: INTERNAL MEDICINE

## 2024-03-20 RX ORDER — GABAPENTIN 300 MG/1
CAPSULE ORAL
Qty: 30 CAPSULE | Refills: 0 | Status: SHIPPED | OUTPATIENT
Start: 2024-03-20

## 2024-03-20 RX ORDER — KETOROLAC TROMETHAMINE 30 MG/ML
30 INJECTION, SOLUTION INTRAMUSCULAR; INTRAVENOUS EVERY 6 HOURS PRN
Status: SHIPPED | OUTPATIENT
Start: 2024-03-20 | End: 2024-03-25

## 2024-03-20 RX ORDER — KETOROLAC TROMETHAMINE 10 MG/1
10 TABLET, FILM COATED ORAL EVERY 6 HOURS PRN
Qty: 20 TABLET | Refills: 0 | Status: SHIPPED | OUTPATIENT
Start: 2024-03-20

## 2024-03-20 RX ADMIN — KETOROLAC TROMETHAMINE 30 MG: 30 INJECTION, SOLUTION INTRAMUSCULAR; INTRAVENOUS at 14:53

## 2024-03-20 NOTE — TELEPHONE ENCOUNTER
I called pt with clairification re ct chest and need for dx and f/u issues ---, I ordered a CT of the chest without contrast follow-up pulmonary nodules and the letter that she got from the hospital regarding the need for follow-up CT of the chest, she is also getting workup on her hand issues and pain that she has been dealing with,

## 2024-03-20 NOTE — ASSESSMENT & PLAN NOTE
She has injections completed in carpometacarpal joint of both thumbs a few weeks ago.  She states that the right hand is worse. She states that it feels like a constant toothache. She states that it is throbbing.   She states that her left hand is better.  She is taking motrin and is not getting much relief.   She states she reached out to ortho but she states they could not do much for her.  We will refer her to hand specialist.  She may apply diclofenac gel. Will give short round of prednisone. Reviewed A1c and it has been controlled.  We will also give toradol injection today-she was instructed not to take any more NSAIDS today. Patient acknowledges understanding and is agreeable with plan     Patient was just seen by Etelvina last week for the same issues, her note is above and was reviewed.  She was also given steroids for a week 40 mg weaning to 20 mg, reports no benefit.  She is on chronic meloxicam as well.  Discussed with her to hold this in the future when on prednisone and/or another anti-inflammatory type pain medication.    Exam with swelling in the CMC joint, tender to palpation and with just movement, no erythema/cellulitis, pulses are intact, hand is warm.    We will repeat the x-ray, get a few labs, repeat the Toradol shot, and get her some oral Toradol.  Will also see if she gets any benefit from gabapentin given the burning nature to the pain.    Of note, patient has appointment with both the hand clinic and Dr. Smyth next week.

## 2024-03-20 NOTE — TELEPHONE ENCOUNTER
Caller: NAGA    Relationship:     Best call back number: [phone]     What orders are you requesting (i.e. lab or imaging): ANNUAL LUNG CANCER SCREENING    In what timeframe would the patient need to come in: ASAP    Where will you receive your lab/imaging services: [facility]

## 2024-03-20 NOTE — PROGRESS NOTES
"Chief Complaint  Right Hand Pain (59yo F presents in office today c/o constant & severe pain in the rt hand, thumb & forefinger. Pt has difficulty picking up objects, pull pants down, or opening up her hand increases the pain. Current pain lvl 6/10./Pt underwent steroid injections in heather hands on 02/28/24 by Dr. Womack. Lt hand pain diminished, but right hand pain continues. Pt seen Etelvina Cardoso on 03/12/24 and was given a Toradol shot that was effective for a short while. The prednisone was ineffective.)    Subjective      Gualberto Gloria presents to [facility]    History of Present Illness  Patient is a 58-year-old female, patient of Dr. Stephen, with underlying hypertension, hyperlipidemia, hypothyroidism, depression, among others, who is being seen 3/24 for urgent visit in regards to issues outlined in the chief complaint above    Review of Systems   Constitutional:  Negative for appetite change, fatigue and fever.   HENT:  Negative for congestion and ear pain.    Eyes:  Negative for blurred vision.   Respiratory:  Negative for cough, chest tightness, shortness of breath and wheezing.    Cardiovascular:  Negative for chest pain, palpitations and leg swelling.   Gastrointestinal:  Negative for abdominal pain.   Genitourinary:  Negative for difficulty urinating, dysuria and hematuria.   Musculoskeletal:  Negative for arthralgias and gait problem.        Right hand issues as per chief complaint.   Skin:  Negative for skin lesions.   Neurological:  Negative for syncope, memory problem and confusion.   Psychiatric/Behavioral:  Negative for self-injury and depressed mood.        Objective   Vital Signs:   /71 (BP Location: Left arm, Patient Position: Sitting)   Pulse 81   Temp 97.7 °F (36.5 °C) (Temporal)   Ht 165.2 cm (65.04\")   Wt 86.1 kg (189 lb 14.4 oz)   SpO2 93%   BMI 31.56 kg/m²         Physical Exam  Vitals and nursing note reviewed.   Constitutional:       " General: She is not in acute distress.     Appearance: Normal appearance. She is not toxic-appearing.   HENT:      Head: Atraumatic.      Right Ear: External ear normal.      Left Ear: External ear normal.      Nose: Nose normal.      Mouth/Throat:      Mouth: Mucous membranes are moist.   Eyes:      General:         Right eye: No discharge.         Left eye: No discharge.      Extraocular Movements: Extraocular movements intact.      Pupils: Pupils are equal, round, and reactive to light.   Cardiovascular:      Rate and Rhythm: Normal rate and regular rhythm.      Pulses: Normal pulses.      Heart sounds: Normal heart sounds. No murmur heard.     No gallop.   Pulmonary:      Effort: Pulmonary effort is normal. No respiratory distress.      Breath sounds: No wheezing, rhonchi or rales.   Abdominal:      General: There is no distension.      Palpations: Abdomen is soft. There is no mass.      Tenderness: There is no abdominal tenderness. There is no guarding.   Musculoskeletal:         General: No swelling or tenderness.      Cervical back: No tenderness.      Right lower leg: No edema.      Left lower leg: No edema.   Skin:     General: Skin is warm and dry.      Findings: No rash.   Neurological:      General: No focal deficit present.      Mental Status: She is alert and oriented to person, place, and time. Mental status is at baseline.      Motor: No weakness.      Gait: Gait normal.   Psychiatric:         Mood and Affect: Mood normal.         Thought Content: Thought content normal.          Result Review   The following data was reviewed by: Dhruv Campos MD on 03/20/2024:  [] Laboratory  [] Microbiology  [] Radiology  [] EKG/telemetry  [] Cardiology/Vascular  [] Pathology  [x] Old records             Assessment and Plan   Diagnoses and all orders for this visit:    1. Arthritis of carpometacarpal (CMC) joint of both thumbs (Primary)  Assessment & Plan:  She has injections completed in carpometacarpal joint of  both thumbs a few weeks ago.  She states that the right hand is worse. She states that it feels like a constant toothache. She states that it is throbbing.   She states that her left hand is better.  She is taking motrin and is not getting much relief.   She states she reached out to ortho but she states they could not do much for her.  We will refer her to hand specialist.  She may apply diclofenac gel. Will give short round of prednisone. Reviewed A1c and it has been controlled.  We will also give toradol injection today-she was instructed not to take any more NSAIDS today. Patient acknowledges understanding and is agreeable with plan     Patient was just seen by Etelvina last week for the same issues, her note is above and was reviewed.  She was also given steroids for a week 40 mg weaning to 20 mg, reports no benefit.  She is on chronic meloxicam as well.  Discussed with her to hold this in the future when on prednisone and/or another anti-inflammatory type pain medication.    Exam with swelling in the CMC joint, tender to palpation and with just movement, no erythema/cellulitis, pulses are intact, hand is warm.    We will repeat the x-ray, get a few labs, repeat the Toradol shot, and get her some oral Toradol.  Will also see if she gets any benefit from gabapentin given the burning nature to the pain.    Of note, patient has appointment with both the hand clinic and Dr. Smyth next week.    Orders:  -     ketorolac (TORADOL) injection 30 mg  -     gabapentin (NEURONTIN) 300 MG capsule; Take 1 capsule in the evening, may increase to 3 times daily if no sedation.  Dispense: 30 capsule; Refill: 0  -     XR Hand 3+ View Right; Future  -     Uric Acid; Future  -     CBC & Differential; Future  -     Sedimentation Rate; Future  -     C-reactive protein; Future    2. Primary hypertension  Assessment & Plan:  Blood pressure is a nonissue as of her 3/24 office visit.  She is on moderate dose lisinopril/HCT and stable to  continue same.      Other orders  -     ketorolac (TORADOL) 10 MG tablet; Take 1 tablet by mouth Every 6 (Six) Hours As Needed for Moderate Pain.  Dispense: 20 tablet; Refill: 0                      Follow Up   Return for Next scheduled follow up.  Patient was given instructions and counseling regarding her condition or for health maintenance advice. Please see specific information pulled into the AVS if appropriate.     Total Time Spent:   minutes     This time includes time spent by me in the following activities: preparing for the visit, reviewing extensive past medical history and tests, performing a medically appropriate examination and/or evaluation, counseling and educating the patient and/or caregivers, ordering medications, tests, or procedures, referring and/or communicating with other health care professionals and documenting information in the medical record all on this date of service.

## 2024-03-20 NOTE — ASSESSMENT & PLAN NOTE
Blood pressure is a nonissue as of her 3/24 office visit.  She is on moderate dose lisinopril/HCT and stable to continue same.

## 2024-03-20 NOTE — TELEPHONE ENCOUNTER
Caller: Gualberto Gloria    Relationship to patient: Self    Best call back number: [phone]    Patient is needing: PATIENT CALLED THIS MORNING AND SCHEDULED SAME DAY APPOINTMENT WITH MD MARIEE.

## 2024-03-22 ENCOUNTER — TELEPHONE (OUTPATIENT)
Dept: INTERNAL MEDICINE | Age: 59
End: 2024-03-22

## 2024-03-22 NOTE — TELEPHONE ENCOUNTER
Call patient tell her she has some degenerative changes in the thumb joint, they appear to be just a little bit worse than when compared to prior x-rays from about 6 months ago, she have to go see a hand specialist to discuss getting a shot into that joint for pain relief or just take an anti-inflammatory

## 2024-03-22 NOTE — TELEPHONE ENCOUNTER
Caller: Gualberto Gloria    Relationship: Self    Best call back number: [phone]     Caller requesting test results: YES    What test was performed: XRAY AND LABS    When was the test performed: 3.20.24    Where was the test performed:     Additional notes: PATIENT WOULD LIKE A CALL BACK WITH HER RESULTS AS SOON AS THEY ARE IN. PATIENT IS STATING SHE IS STILL HAVING PAIN IN THE JOIN OF THE THUMB EVEN AFTER TAKING PAIN MEDICATION.

## 2024-03-22 NOTE — TELEPHONE ENCOUNTER
Spoke with Dr. Begum. He cannot prescribe any narcotics without being seen in the office. Patient advised.

## 2024-03-22 NOTE — TELEPHONE ENCOUNTER
Caller: Gualberto Gloria    Relationship to patient: Self    Best call back number: [phone]    Patient is needing: REQUESTING CALL BACK WITH THESE RESULTS.

## 2024-03-23 DIAGNOSIS — E78.2 MIXED HYPERLIPIDEMIA: ICD-10-CM

## 2024-03-23 DIAGNOSIS — F32.89 OTHER DEPRESSION: ICD-10-CM

## 2024-03-25 RX ORDER — PAROXETINE HYDROCHLORIDE 40 MG/1
40 TABLET, FILM COATED ORAL EVERY MORNING
Qty: 90 TABLET | Refills: 0 | Status: SHIPPED | OUTPATIENT
Start: 2024-03-25

## 2024-03-25 RX ORDER — ATORVASTATIN CALCIUM 20 MG/1
20 TABLET, FILM COATED ORAL DAILY
Qty: 90 TABLET | Refills: 0 | Status: SHIPPED | OUTPATIENT
Start: 2024-03-25

## 2024-03-27 ENCOUNTER — OFFICE VISIT (OUTPATIENT)
Dept: INTERNAL MEDICINE | Age: 59
End: 2024-03-27
Payer: COMMERCIAL

## 2024-03-27 VITALS
OXYGEN SATURATION: 94 % | HEIGHT: 65 IN | BODY MASS INDEX: 31.65 KG/M2 | WEIGHT: 190 LBS | TEMPERATURE: 98.2 F | DIASTOLIC BLOOD PRESSURE: 77 MMHG | SYSTOLIC BLOOD PRESSURE: 109 MMHG | HEART RATE: 79 BPM

## 2024-03-27 DIAGNOSIS — M19.041 PRIMARY OSTEOARTHRITIS OF BOTH HANDS: Primary | ICD-10-CM

## 2024-03-27 DIAGNOSIS — M19.042 PRIMARY OSTEOARTHRITIS OF BOTH HANDS: Primary | ICD-10-CM

## 2024-03-27 DIAGNOSIS — Z01.818 PREOPERATIVE CLEARANCE: ICD-10-CM

## 2024-03-27 DIAGNOSIS — M79.641 HAND PAIN, RIGHT: ICD-10-CM

## 2024-03-27 LAB
ALBUMIN SERPL-MCNC: 4.2 G/DL (ref 3.5–5.2)
ALBUMIN/GLOB SERPL: 1.9 G/DL
ALP SERPL-CCNC: 91 U/L (ref 39–117)
ALT SERPL W P-5'-P-CCNC: 19 U/L (ref 1–33)
ANION GAP SERPL CALCULATED.3IONS-SCNC: 10 MMOL/L (ref 5–15)
AST SERPL-CCNC: 20 U/L (ref 1–32)
BILIRUB SERPL-MCNC: 0.3 MG/DL (ref 0–1.2)
BUN SERPL-MCNC: 16 MG/DL (ref 6–20)
BUN/CREAT SERPL: 15.7 (ref 7–25)
CALCIUM SPEC-SCNC: 9.3 MG/DL (ref 8.6–10.5)
CHLORIDE SERPL-SCNC: 102 MMOL/L (ref 98–107)
CO2 SERPL-SCNC: 26 MMOL/L (ref 22–29)
CREAT SERPL-MCNC: 1.02 MG/DL (ref 0.57–1)
EGFRCR SERPLBLD CKD-EPI 2021: 63.5 ML/MIN/1.73
GLOBULIN UR ELPH-MCNC: 2.2 GM/DL
GLUCOSE SERPL-MCNC: 92 MG/DL (ref 65–99)
POTASSIUM SERPL-SCNC: 4 MMOL/L (ref 3.5–5.2)
PROT SERPL-MCNC: 6.4 G/DL (ref 6–8.5)
SODIUM SERPL-SCNC: 138 MMOL/L (ref 136–145)

## 2024-03-27 PROCEDURE — 80053 COMPREHEN METABOLIC PANEL: CPT | Performed by: INTERNAL MEDICINE

## 2024-03-27 RX ORDER — DEXAMETHASONE 4 MG/1
4 TABLET ORAL 2 TIMES DAILY WITH MEALS
Qty: 15 TABLET | Refills: 0 | Status: SHIPPED | OUTPATIENT
Start: 2024-03-27

## 2024-03-27 NOTE — PROGRESS NOTES
"CHIEF COMPLAINT/ HPI:  Surgical Clearance (Surgical Clearance, Rt hand surgery. Pt is needing to have a EKG and CMP lab done. )    Patient says gabapentin meloxicam ibuprofen, Toradol have not helped her pain, she is got an appointment with hand surgery for correction and fixation and needs preop clearance as above, other treatment options discussed she has tried gabapentin without much success also as of March 27, 2024          Objective   Vital Signs  Vitals:    03/27/24 1040   BP: 109/77   Pulse: 79   Temp: 98.2 °F (36.8 °C)   SpO2: 94%   Weight: 86.2 kg (190 lb)   Height: 165.2 cm (65.04\")      Body mass index is 31.58 kg/m².  Review of Systems as above  Physical Exam   Result Review :   No results found for: \"PROBNP\", \"BNP\"  CMP          6/1/2023    09:25 12/11/2023    09:05   CMP   Glucose 97  95    BUN 18  16    Creatinine 0.91  1.00    EGFR 73.3  65.0    Sodium 137  135    Potassium 4.4  4.3    Chloride 100  97    Calcium 9.4  9.9    Total Protein 6.6  7.2    Albumin 4.2  4.6    Globulin 2.4  2.6    Total Bilirubin 0.4  0.4    Alkaline Phosphatase 96  125    AST (SGOT) 20  21    ALT (SGPT) 18  14    Albumin/Globulin Ratio 1.8  1.8    BUN/Creatinine Ratio 19.8  16.0    Anion Gap 10.7  11.0      CBC w/diff          6/1/2023    09:25 12/11/2023    09:05 3/20/2024    14:44   CBC w/Diff   WBC 5.30  7.60  5.87    RBC 4.35  4.60  4.08    Hemoglobin 13.4  14.3  13.0    Hematocrit 39.5  42.6  37.9    MCV 90.8  92.6  92.9    MCH 30.8  31.1  31.9    MCHC 33.9  33.6  34.3    RDW 12.8  13.0  13.0    Platelets 253  301  251    Neutrophil Rel % 60.7  57.5  57.5    Immature Granulocyte Rel % 0.6  0.5  0.2    Lymphocyte Rel % 29.2  30.8  30.5    Monocyte Rel % 7.0  8.8  8.9    Eosinophil Rel % 1.7  1.7  2.2    Basophil Rel % 0.8  0.7  0.7       Lipid Panel          6/1/2023    09:25 2/6/2024    09:14   Lipid Panel   Total Cholesterol 216  186    Triglycerides 137  95    HDL Cholesterol 74  64    VLDL Cholesterol 24  17  "   LDL Cholesterol  118  105    LDL/HDL Ratio 1.55  1.61       Lab Results   Component Value Date    TSH 2.860 12/11/2023    TSH 3.120 11/16/2022    TSH 2.330 12/02/2021      Lab Results   Component Value Date    FREET4 1.36 12/11/2023    FREET4 1.29 11/16/2022    FREET4 1.34 12/02/2021      A1C Last 3 Results          6/1/2023    09:25 12/11/2023    09:05   HGBA1C Last 3 Results   Hemoglobin A1C 6.00  6.30                 ECG 12 Lead    Date/Time: 3/27/2024 11:09 AM  Performed by: Jaspal Gutierrez MD    Authorized by: Jaspal Gutierrez MD  Comparison: not compared with previous ECG   Previous ECG: no previous ECG available  Rhythm: sinus rhythm  Rate: normal  Conduction: conduction normal  ST Segments: ST segments normal  T Waves: T waves normal    Clinical impression: normal ECG              Visit Diagnoses:    ICD-10-CM ICD-9-CM   1. Primary osteoarthritis of both hands  M19.041 715.14    M19.042    2. Preoperative clearance  Z01.818 V72.84   3. Hand pain, right  M79.641 729.5       Assessment and Plan   Diagnoses and all orders for this visit:    1. Primary osteoarthritis of both hands (Primary)  -     Comprehensive Metabolic Panel; Future    2. Preoperative clearance  -     Comprehensive Metabolic Panel; Future    3. Hand pain, right    Other orders  -     ECG 12 Lead  -     dexAMETHasone (DECADRON) 4 MG tablet; Take 1 tablet by mouth 2 (Two) Times a Day With Meals.  Dispense: 15 tablet; Refill: 0    Preop clearance for upcoming hand surgery by orthopedics, she needs an EKG and a CMP done, EKG was performed today showing a sinus rhythm normal EKG, March 27, 2024--- patient says meloxicam does not help, orders were to be faxed to [phone] [facility],----- Dr. Campos gave her some gabapentin 300 mg up to 3 times a day, not helping either, in addition to Mobic,------- will send in some Decadron, until patient can get into surgery,    BMI is >= 30 and <35. (Class 1 Obesity). The following  options were offered after discussion;: weight loss educational material (shared in after visit summary) and exercise counseling/recommendations     Oral HSV 1,, uses oral Valtrex as needed    abdominal pains May 9, 2023 --- ultrasound of the gallbladder was performed Su 15, 2023, ultrasound shows pancreas obscured by bowel gas no evidence of gallstones or cholecystitis, no biliary ductal dilation, nuclear medicine HIDA scan, normal examination Su 15, 2023 Margarito, labs noted normal liver function,    Constipation, treatment options discussed,    Arthritic pain, shoulder pain, will refill ibuprofen 800 mg 3 times daily as needed, also tried Mobic which did not help,    Mixed hyperlipidemia continues  Crestor 20 mg daily,    Coronary artery calcifications  CT scan discussed cardiac risks atherosclerosis etc. changing statins continue, December 16, 2022     Hypothyroidism continues levothyroxine 25 mcg daily    Depression, continues Paxil daily,    Impaired fasting glucose hemoglobin A1c at 6.3, up slightly December 2023     Gastroesophageal reflux disease continues omeprazole 40 mg daily    Hypertension continues lisinopril HCTZ 20-12.5 mg daily,    Pulmonary nodule, 3 mm left lower lobe----CAT scan December 23, 2021, --- CT scan December 14, 2022 showed coronary artery calcifications finding consistent with prior granulomatous disease no new or enlarging pulmonary nodules--- the previously identified 3 mm left lower lobe nodule was not identified December 2022    Vitamin B12 at 262 --dec 2022 --- continues over-the-counter B12 supplements daily, improved, June 2023,    Tobacco use,--smoked total of 37 years, quit about 5 years ago, gained some weight,    Does not want a mammogram at this time, discussed December 2023        Follow Up   Return for Next scheduled follow up.  Patient was given instructions and counseling regarding her condition or for health maintenance advice. Please see specific information pulled  into the AVS if appropriate.

## 2024-04-04 ENCOUNTER — TELEPHONE (OUTPATIENT)
Dept: INTERNAL MEDICINE | Age: 59
End: 2024-04-04

## 2024-04-04 NOTE — TELEPHONE ENCOUNTER
Caller: Gualberto Gloria    Relationship: Self    Best call back number: [phone]     What form or medical record are you requesting: ALL NOTES/RECORDS PERTAINING TO HER HAND INJURY INCLUDING IMAGING. PATIENT PLANS ON STARTING A WORKMAN'S COMP CASE.    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 PLEASE CALL WHEN COMPLETED.

## 2024-04-05 NOTE — TELEPHONE ENCOUNTER
Pt picked these up, she is still wanting a statement that this was work related. I am asking Dr. Campos about this. Due to she saw him in office for these symptoms.

## 2024-04-05 NOTE — TELEPHONE ENCOUNTER
Per Dr. Campos he can't comment on pt's hand being work related.     Pt has seen Dr. Gutierrez and he stated primary Osteoarthritis, therefore Dr. Gutierrez would have to make the statement if it was work related.

## 2024-04-10 ENCOUNTER — TELEMEDICINE (OUTPATIENT)
Dept: INTERNAL MEDICINE | Age: 59
End: 2024-04-10
Payer: COMMERCIAL

## 2024-04-10 DIAGNOSIS — M79.641 HAND PAIN, RIGHT: ICD-10-CM

## 2024-04-10 DIAGNOSIS — M19.042 PRIMARY OSTEOARTHRITIS OF BOTH HANDS: Primary | ICD-10-CM

## 2024-04-10 DIAGNOSIS — M19.041 PRIMARY OSTEOARTHRITIS OF BOTH HANDS: Primary | ICD-10-CM

## 2024-04-10 DIAGNOSIS — M18.0 ARTHRITIS OF CARPOMETACARPAL (CMC) JOINT OF BOTH THUMBS: ICD-10-CM

## 2024-04-10 DIAGNOSIS — Z98.890 H/O HAND SURGERY: ICD-10-CM

## 2024-04-10 PROCEDURE — 99213 OFFICE O/P EST LOW 20 MIN: CPT | Performed by: INTERNAL MEDICINE

## 2024-04-10 NOTE — PROGRESS NOTES
Patient is here for telehealth visit, consents to visit, patient is being seen by audio and video, patient is being seen at home and I am performing the visit at my office.  CHIEF COMPLAINT/ HPI: Patient is here to follow-up with paperwork and discussion regarding her hand pain and injury, she is also needing some paperwork and documentation for possible Worker's Comp. injury and/or claim, we had previously treated her on March 27, 2024 with steroids and did some preop testing including an EKG and comprehensive profile------ she had previously been given some gabapentin but said anti-inflammatories had not helped her much, the gabapentin also was not helping much.  No chief complaint on file.    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   When I get home at night hurts so bad , only r hand and have to wrap it to keep it from hurting,  Was told that workers comp never approves this type of injury , ---  so we did xray of hand on march 20, 2024,  reviewed --- show degenerative joint disease at the first metacarpal carpal joint and second DIP joint mildly worse compared to prior radiographs from September 2023,--- lab work reviewed from March 20, 2024--------- patient went up to [place] to see orthopedic hand surgeon Dr. Chtio Woodard----told she would have to have surgery on her hand in order to continue working and to retire eventually, so she ended up having the surgery in [place] this past Monday          Objective   Vital Signs  There were no vitals filed for this visit.   There is no height or weight on file to calculate BMI.  Review of Systems   Physical Exam ----alert and o x 3, r hand wrapped in ace bandage patient can open and close her fingers slightly there is some swelling noted of the fingers distally, no acral cyanosis or evidence of ischemia noted,  Result Review  ":   No results found for: \"PROBNP\", \"BNP\"  CMP          6/1/2023    09:25 12/11/2023    09:05 3/27/2024    11:29   CMP   Glucose 97  95  92    BUN 18  16  16    Creatinine 0.91  1.00  1.02    EGFR 73.3  65.0  63.5    Sodium 137  135  138    Potassium 4.4  4.3  4.0    Chloride 100  97  102    Calcium 9.4  9.9  9.3    Total Protein 6.6  7.2  6.4    Albumin 4.2  4.6  4.2    Globulin 2.4  2.6  2.2    Total Bilirubin 0.4  0.4  0.3    Alkaline Phosphatase 96  125  91    AST (SGOT) 20  21  20    ALT (SGPT) 18  14  19    Albumin/Globulin Ratio 1.8  1.8  1.9    BUN/Creatinine Ratio 19.8  16.0  15.7    Anion Gap 10.7  11.0  10.0      CBC w/diff          6/1/2023    09:25 12/11/2023    09:05 3/20/2024    14:44   CBC w/Diff   WBC 5.30  7.60  5.87    RBC 4.35  4.60  4.08    Hemoglobin 13.4  14.3  13.0    Hematocrit 39.5  42.6  37.9    MCV 90.8  92.6  92.9    MCH 30.8  31.1  31.9    MCHC 33.9  33.6  34.3    RDW 12.8  13.0  13.0    Platelets 253  301  251    Neutrophil Rel % 60.7  57.5  57.5    Immature Granulocyte Rel % 0.6  0.5  0.2    Lymphocyte Rel % 29.2  30.8  30.5    Monocyte Rel % 7.0  8.8  8.9    Eosinophil Rel % 1.7  1.7  2.2    Basophil Rel % 0.8  0.7  0.7       Lipid Panel          6/1/2023    09:25 2/6/2024    09:14   Lipid Panel   Total Cholesterol 216  186    Triglycerides 137  95    HDL Cholesterol 74  64    VLDL Cholesterol 24  17    LDL Cholesterol  118  105    LDL/HDL Ratio 1.55  1.61       Lab Results   Component Value Date    TSH 2.860 12/11/2023    TSH 3.120 11/16/2022    TSH 2.330 12/02/2021      Lab Results   Component Value Date    FREET4 1.36 12/11/2023    FREET4 1.29 11/16/2022    FREET4 1.34 12/02/2021      A1C Last 3 Results          6/1/2023    09:25 12/11/2023    09:05   HGBA1C Last 3 Results   Hemoglobin A1C 6.00  6.30                        Visit Diagnoses:    ICD-10-CM ICD-9-CM   1. Primary osteoarthritis of both hands  M19.041 715.14    M19.042    2. Arthritis of carpometacarpal (CMC) joint of " both thumbs  M18.0 716.94   3. Hand pain, right  M79.641 729.5   4. H/O hand surgery  Z98.890 V15.29       Assessment and Plan   Diagnoses and all orders for this visit:    1. Primary osteoarthritis of both hands (Primary)    2. Arthritis of carpometacarpal (CMC) joint of both thumbs    3. Hand pain, right    4. 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 Woodard in [place], this is definitely a work related injury and would qualify and should qualify through Worker's Comp. claims with her work,--- patient previously had steroid shots in September 2023 and again in February 2024,, patient's hand pain has gotten worse and worse due to her job occupation despite getting steroid shots in September and even again in February of this past year,    Preop clearance for upcoming hand surgery by orthopedics, she needs an EKG and a CMP done, EKG was performed today showing a sinus rhythm normal EKG, March 27, 2024--- patient says meloxicam does not help, orders were to be faxed to [phone] ----- Dr. Campos gave her some gabapentin 300 mg up to 3 times a day, not helping either, in addition to Mobic,------- will send in some Decadron, until patient can get into surgery, March 27, 2024,     Oral HSV 1,, uses oral Valtrex as needed     abdominal pains May 9, 2023 --- ultrasound of the gallbladder was performed Su 15, 2023, ultrasound shows pancreas obscured by bowel gas no evidence of  gallstones or cholecystitis, no biliary ductal dilation, nuclear medicine HIDA scan, normal examination Su 15, 2023 Margarito, labs noted normal liver function,     Constipation, treatment options discussed,     Arthritic pain, shoulder pain, will refill ibuprofen 800 mg 3 times daily as needed, also tried Mobic which did not help,     Mixed hyperlipidemia continues  Crestor 20 mg daily,     Coronary artery calcifications  CT scan ----discussed---- cardiac risks atherosclerosis etc. changing statins continue, December 16, 2022      Hypothyroidism continues levothyroxine 25 mcg daily     Depression, continues Paxil daily,     Impaired fasting glucose hemoglobin A1c at 6.3, up slightly December 2023      Gastroesophageal reflux disease continues omeprazole 40 mg daily     Hypertension continues lisinopril HCTZ 20-12.5 mg daily,     Pulmonary nodule, 3 mm left lower lobe----CAT scan December 23, 2021, --- CT scan December 14, 2022 showed coronary artery calcifications finding consistent with prior granulomatous disease no new or enlarging pulmonary nodules--- the previously identified 3 mm left lower lobe nodule was not identified December 2022     Vitamin B12 at 262 --dec 2022 --- continues over-the-counter B12 supplements daily, improved, June 2023,     Tobacco use,--smoked total of 37 years, quit about 5 years ago, gained some weight,     Does not want a mammogram at this time, discussed December 2023    Follow Up   No follow-ups on file.  Patient was given instructions and counseling regarding her condition or for health maintenance advice. Please see specific information pulled into the AVS if appropriate.

## 2024-04-16 ENCOUNTER — TELEPHONE (OUTPATIENT)
Dept: INTERNAL MEDICINE | Age: 59
End: 2024-04-16
Payer: COMMERCIAL

## 2024-04-16 ENCOUNTER — HOSPITAL ENCOUNTER (OUTPATIENT)
Dept: CT IMAGING | Facility: HOSPITAL | Age: 59
Discharge: HOME | End: 2024-04-16
Admitting: INTERNAL MEDICINE
Payer: COMMERCIAL

## 2024-04-16 DIAGNOSIS — R91.8 INDETERMINATE PULMONARY NODULES: ICD-10-CM

## 2024-04-16 PROCEDURE — 71250 CT THORAX DX C-: CPT

## 2024-04-16 NOTE — TELEPHONE ENCOUNTER
Call patient, tell her there were no suspicious pulmonary nodules there was a calcified benign granuloma in the left lower lung but that is a benign finding and there were the stable findings of the coronary artery calcifications, so nothing to do right now and will discuss further at her next appointment

## 2024-04-18 ENCOUNTER — TRANSCRIBE ORDERS (OUTPATIENT)
Dept: PHYSICAL THERAPY | Facility: CLINIC | Age: 59
End: 2024-04-18
Payer: COMMERCIAL

## 2024-04-18 DIAGNOSIS — Z98.890 HISTORY OF SURGERY ON RIGHT WRIST: Primary | ICD-10-CM

## 2024-04-23 ENCOUNTER — TELEPHONE (OUTPATIENT)
Dept: INTERNAL MEDICINE | Age: 59
End: 2024-04-23

## 2024-04-23 NOTE — TELEPHONE ENCOUNTER
[facility] [place], [address]  [address]  [facility] [address]  [phone]      Kvng URI Elaine :2010 MRN:[id number]    2020 Time Session Began: 3:30 PM  Time Session Ended: 4:30 PM     Session Type:60 Minute Therapy (96295)    Others Present: Patient's father and two younger sisters for half of the appointment     Intervention: Behavioral    Suicide/Homicide/Violence Ideation: No    If Yes, explain: N/A    Current Outpatient Medications   Medication Sig   • Lisdexamfetamine Dimesylate (VYVANSE) 40 MG Chew Tab Chew 40 mg by mouth daily. Indications: Attention Deficit Hyperactivity Disorder   • guanFACINE (TENEX) 1 MG tablet Take 1 tablet by mouth 2 times daily. Indications: adhd   • melatonin 5 MG Take 1 tablet by mouth nightly.   • DiphenhydrAMINE HCl (BENADRYL ALLERGY CHILDRENS PO)    • Loratadine (CLARITIN PO) Take by mouth as needed.   • acetaminophen (TYLENOL) 160 MG/5ML suspension Take 15 mg/kg by mouth every 4 hours as needed for Fever or Pain. Indications: Fever   • IBUPROFEN PO Take  by mouth.   • vitamin - therapeutic multivitamins w/minerals (CENTRUM SILVER,THERA-M) TABS Take 1 tablet by mouth daily.     No current facility-administered medications for this visit.        Change in Medication(s) Reported: No  If Yes, explain: N/A    Patient/Family Education Provided: Yes  Patient/Family Displays Understanding: Yes    If No, explain: N/A    Chief complaint in patient's own words: \"behavioral problems.\"    Progress Note containing chief complaint and symptoms/problems related to the complaint:    (Data/Action/Response/Plan)    D: Patient's father reported that patient's behaviors have still been poor. Patient has been throwing things (escalating to throwing things at his father as well), kicking the walls, yelling and argumentative. Patient stated that the one thing he is struggling with is not listening when his parents ask him to wait.  Spoke to pt, confirmed that all information was faxed to [facility]. Advised pt to contact Work[employer] and find out the status of next step. Done    Patient stated that he has not been getting bullied at school anymore and that is why he has not been talking to his parents about things at school right now. Patient's father stated that patient is able to rattle off rules to his sisters and then breaks the rules right after. Patient acknowledged that he knows right from wrong and often feels like getting revenge on his parents because they make him promises they don't keep. Patient struggles with being stuck on his viewpoints of situations and not being able to adjust to things that he feels are not okay. Patient's father shared that patient has continued to do well in school, but often reports being bored.    A: Increase positive communication between patient and his father. Discussed anxiety symptoms to patient's father to further increase understanding of symptomology. Role played different situations with patient and ways to manage difficult emotions.   R: Patient and his father had both raised there voices slightly and made comments towards each other about what things are really like in the home and what things \"true and untrue.\" Patient's father acknowledged that parents also get frustrated sometimes and that they all need to do a better job of communicating. Patient stated that he often tries to not get more angry, but then his parents keep asking him to do things and the anger keeps building up. He was able to show an understanding that if he would listen the first time, then they would not have to keep asking him. Patient was able to demonstrate communicating how he feels about different rules and different things his parents do rather than acting on his anger and trying to get revenge. When patient practices communication skills with this writer we also talked about the fact that he will not always get his way, but this may lead to his parents being more willing to work with him if he communicates with them.   P: Therapy every 2-3 weeks focused on  behavioral changes and increasing positive communication within the household. Alleviate anxiety symptoms and ability to express feelings verbally. Patient is expected to use feeling words when frustrated/angry about something with his parents to express himself.     Need for Community Resources Assessed: Yes    Resources Needed: No    If Yes, what resources: N/A    Primary Diagnosis: Adjustment disorder with disturbance of conduct  : 2 Moderate    Treatment Plan: Unchanged    Discharge Plan: N/A    Next Appointment: 1/28/2020  Ga Vázquez LCSW

## 2024-04-23 NOTE — TELEPHONE ENCOUNTER
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

## 2024-04-24 ENCOUNTER — TREATMENT (OUTPATIENT)
Dept: PHYSICAL THERAPY | Facility: CLINIC | Age: 59
End: 2024-04-24
Payer: COMMERCIAL

## 2024-04-24 DIAGNOSIS — M79.641 RIGHT HAND PAIN: ICD-10-CM

## 2024-04-24 DIAGNOSIS — M65.321 TRIGGER FINGER, RIGHT INDEX FINGER: ICD-10-CM

## 2024-04-24 DIAGNOSIS — M65.311 TRIGGER FINGER OF RIGHT THUMB: ICD-10-CM

## 2024-04-24 DIAGNOSIS — M18.11 PRIMARY OSTEOARTHRITIS OF FIRST CARPOMETACARPAL JOINT OF RIGHT HAND: Primary | ICD-10-CM

## 2024-04-24 PROCEDURE — 97166 OT EVAL MOD COMPLEX 45 MIN: CPT | Performed by: PHYSICAL THERAPIST

## 2024-04-24 PROCEDURE — 97110 THERAPEUTIC EXERCISES: CPT | Performed by: PHYSICAL THERAPIST

## 2024-04-24 NOTE — PROGRESS NOTES
Outpatient Occupational Therapy Ortho Initial Evaluation                                 [address] [place] [address]    Patient: Gualberto Gloria   : 1965  Diagnosis/ICD-10 Code:  Primary osteoarthritis of first carpometacarpal joint of right hand [M18.11]  Referring practitioner: Chito Woodard,*  Date of Initial Visit: 2024  Today's Date: 2024  Patient seen for 1 sessions               Subjective Questionnaire: QuickDASH: 35/55    Past Medical History: hypertension    Subjective Evaluation    History of Present Illness  Date of surgery: 2024  Mechanism of injury: Pt reports R thumb pain for years that increased since September. Pt had 2 prior cortisone injections and the second one did not work. Pt had R CMC arthroplasty, R index finger trigger release and thumb trigger release. Pt is in cast and referred for Occupational Therapy for evaluation and treatment for finger range of motion. Pt wishes to get HEP today and return after the cast comes off for more formal therapy on her whole hand and wrist. 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      Patient Occupation: [employer]    Precautions and Work Restrictions: off workPain  Current pain rating: 3  At worst pain rating: 10  Quality: burning  Relieving factors: medications    Hand dominance: left    Diagnostic Tests  X-ray: abnormal    Patient Goals  Patient goals for therapy: decreased pain, return to work and increased motion  Patient goal: Return to gardening           Objective          Observations     Right Wrist/Hand   Positive for Heberden's nodes.     Additional Wrist/Hand Observation Details  Thumb spica cast on R hand    Tenderness     Additional Tenderness Details  Tenderness in R thumb    Neurological Testing     Sensation     Wrist/Hand     Right   Intact: light touch    Additional Neurological Details  Occasional tingling in finger  tips    Active Range of Motion     Additional Active Range of Motion Details  Immobilized in cast  Full loose fist to cast with index finger tighter, full digital extension with pain at end range    Strength/Myotome Testing     Left Wrist/Hand      (2nd hand position)     Trial 1: 52 lbs    Trial 2: 56 lbs    Trial 3: 56 lbs    Average: 54.67 lbs          Assessment & Plan       Assessment  Impairments: abnormal coordination, abnormal or restricted ROM, activity intolerance, impaired physical strength and pain with function   Other impairment: difficulty bathing, dressing, buttoning, zipping, tying  Functional limitations: carrying objects, lifting, pulling and pushing   Prognosis: good    Goals  Plan Goals: 1. The patient complains of pain in the R thumb.                  LTG 1: 12 weeks:  The patient will report a pain rating of 2/10 or better with use in order to improve sleep quality and tolerance to performance of activities of daily living.                                  STATUS:  New                  STG 1a: 6 weeks:  The patient will report a pain rating of 6/10 or better at worst.                                   STATUS:  New  2. The patient has limited ROM of the R wrist and digits.                  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                                  STATUS:  New                   STG 2a: 6 weeks:  The patient will demonstrate good tolerance to wrist and thumb range of motion measurements.                                  STATUS:  New                             3. The patient has limited strength of the R hand.                  LTG 3: 12 weeks:  The patient will demonstrate 45 lbs R  strength in order to open jars/bottles and return to bus driving.                                  STATUS:  New                  STG 3a: 8 weeks:   The patient will demonstrate good tolerance to R  strength testing.                                  STATUS:  New  4. Carrying, Moving, and Handling Objects Functional Limitation                                    LTG 4: 12 weeks:  The patient will achieve a score of 11/55 on the Quick DASH.                                  STATUS:  New                  STG 4a: 6 weeks:  The patient will achieve a score of 23/55 on the Quick DASH.                                    STATUS:  New                        Plan  Planned modality interventions: TENS, thermotherapy (hydrocollator packs) and thermotherapy (paraffin bath)  Planned therapy interventions: manual therapy, functional ROM exercises, fine motor coordination training, flexibility, stretching, strengthening, home exercise program, neuromuscular re-education, soft tissue mobilization and therapeutic activities  Frequency: 2x week  Duration in weeks: 12  Treatment plan discussed with: patient  Plan details: Pt to begin 2x week after cast comes off on 5/15.          ICD-10-CM ICD-9-CM   1. Primary osteoarthritis of first carpometacarpal joint of right hand  M18.11 715.14   2. Trigger finger of right thumb  M65.311 727.03   3. Trigger finger, right index finger  M65.321 727.03   4. Right hand pain  M79.641 729.5       Patient is indicated for skilled occupational therapy services.    History # of Personal Factors and/or Comorbidities: MODERATE (1-2)  Examination of Body System(s): # of elements: MODERATE (3)  Clinical Presentation: EVOLVING  Clinical Decision Making: MODERATE     Evaluation:  Low Complexity:    0     mins  45114;  Mod Complexity:    45     mins  32236;  High Complexity:    0     mins  60358;    Timed:  Manual Therapy:    0     mins  75681;  Therapeutic Exercise:    15     mins  68719;   Therapeutic Activity:    0     mins  19548;  Neuromuscular Ramu:    0    mins  49921;    Ultrasound:     0     mins  23586;    Electrical Stimulation:    0     mins   82159 ( );      Timed Treatment:   15   mins   Total Treatment:     60   mins      OT SIGNATURE: Margie Schwartz OTR/L    Electronically signed   License number [id number]   DATE TREATMENT INITIATED: 4/24/2024    Initial Certification  Certification Period: 4/24/2024 thru 7/22/2024  I certify that the therapy services are furnished while this patient is under my care.  The services outlined above are required by this patient, and will be reviewed every 90 days.     PHYSICIAN: Chito Woodard MD  NPI: [id number]                                          DATE:     Please sign and return via fax to  [phone]   Thank you, [facility] Occupational Therapy.

## 2024-04-25 ENCOUNTER — TELEPHONE (OUTPATIENT)
Dept: INTERNAL MEDICINE | Age: 59
End: 2024-04-25

## 2024-04-25 NOTE — TELEPHONE ENCOUNTER
Caller: Gualberto Gloria    Relationship to patient: Self    Best call back number: [phone]    Patient is needing: PATIENT STATES THAT WORKER[employer]S [employer] IS NOW REQUESTING NOTES FROM DR. MARIEE SINCE HE ALSO SAW PATIENT REGARDING HER RIGHT HAND.

## 2024-05-19 DIAGNOSIS — E03.8 OTHER SPECIFIED HYPOTHYROIDISM: ICD-10-CM

## 2024-05-20 RX ORDER — OMEPRAZOLE 40 MG/1
40 CAPSULE, DELAYED RELEASE ORAL DAILY
Qty: 90 CAPSULE | Refills: 0 | Status: SHIPPED | OUTPATIENT
Start: 2024-05-20

## 2024-05-20 RX ORDER — LEVOTHYROXINE SODIUM 0.03 MG/1
TABLET ORAL
Qty: 90 TABLET | Refills: 0 | Status: SHIPPED | OUTPATIENT
Start: 2024-05-20

## 2024-05-23 ENCOUNTER — TREATMENT (OUTPATIENT)
Dept: PHYSICAL THERAPY | Facility: CLINIC | Age: 59
End: 2024-05-23
Payer: COMMERCIAL

## 2024-05-23 DIAGNOSIS — M79.641 RIGHT HAND PAIN: ICD-10-CM

## 2024-05-23 DIAGNOSIS — M65.311 TRIGGER FINGER OF RIGHT THUMB: ICD-10-CM

## 2024-05-23 DIAGNOSIS — M18.11 PRIMARY OSTEOARTHRITIS OF FIRST CARPOMETACARPAL JOINT OF RIGHT HAND: Primary | ICD-10-CM

## 2024-05-23 DIAGNOSIS — M65.321 TRIGGER FINGER, RIGHT INDEX FINGER: ICD-10-CM

## 2024-05-23 PROCEDURE — 97140 MANUAL THERAPY 1/> REGIONS: CPT | Performed by: PHYSICAL THERAPIST

## 2024-05-23 PROCEDURE — 97530 THERAPEUTIC ACTIVITIES: CPT | Performed by: PHYSICAL THERAPIST

## 2024-05-23 PROCEDURE — 97110 THERAPEUTIC EXERCISES: CPT | Performed by: PHYSICAL THERAPIST

## 2024-05-23 PROCEDURE — 97112 NEUROMUSCULAR REEDUCATION: CPT | Performed by: PHYSICAL THERAPIST

## 2024-05-23 NOTE — PROGRESS NOTES
Progress Note  [address] [place] [address]      Patient: Gualberto Gloria   : 1965  Referring practitioner: Chito Woodard,*  Date of Initial Visit: Type: THERAPY  Noted: 2024  Today's Date: 2024  Patient seen for 2 sessions         Subjective Evaluation    History of Present Illness  Date of surgery: 2024  Mechanism of injury: Pt reports R thumb pain for years that increased since September. Pt had 2 prior cortisone injections and the second one did not work. Pt had R CMC arthroplasty, R index finger trigger release and thumb trigger release. Pt attended one therapy visit prior to get HEP until cast came off. She is now returning after cast removal on 5/15 to begin formal therapy.  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 She reports her R index finger staying swollen and tight.     Quick DASH 29/55  Objective          Observations     Right Wrist/Hand   Positive for Heberden's nodes.     Additional Wrist/Hand Observation Details  Pt wearing prefab hand thumb spica    Tenderness     Additional Tenderness Details  Tenderness in R thumb    Neurological Testing     Sensation     Wrist/Hand     Right   Intact: light touch    Additional Neurological Details  Occasional tingling in finger tips    Active Range of Motion     Right Wrist   Wrist flexion: 35 degrees   Wrist extension: 50 degrees     Right Thumb   Flexion      MP: 25 degrees    DIP: 40 degrees  Opposition: To side of small finger tip    Additional Active Range of Motion Details  Full loose fist except index finger lacks 2 cm to DPC full digital extension     Strength/Myotome Testing     Left Wrist/Hand      (2nd hand position)     Trial 1: 52 lbs    Trial 2: 56 lbs    Trial 3: 56 lbs    Average: 54.67 lbs      See Exercise, Manual, and Modality Logs for complete treatment.       Assessment & Plan       Assessment  Impairments: abnormal coordination, abnormal or restricted ROM, activity intolerance, impaired physical strength and pain with function   Other impairment: buttoning, zipping, tying, opening door knobs, starting car  Functional limitations: carrying objects, lifting, pulling and pushing   Assessment details: Bathing and dressing getting easier  Prognosis: good    Goals  Plan Goals: 1. The patient complains of pain in the R thumb.                  LTG 1: 12 weeks:  The patient will report a pain rating of 2/10 or better with use in order to improve sleep quality and tolerance to performance of activities of daily living.                                  STATUS:  Not met                  STG 1a: 6 weeks:  The patient will report a pain rating of 6/10 or better at worst.                                   STATUS:  Not met  2. The patient has limited ROM of the R wrist and digits.                  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                                  STATUS:  Updated and partially met                  STG 2a: 6 weeks:  The patient will demonstrate good tolerance to wrist and thumb range of motion measurements.                                  STATUS:  Met                             3. The patient has limited strength of the R hand.                  LTG 3: 12 weeks:  The patient will demonstrate  45 lbs R  strength in order to open jars/bottles and return to bus driving.                                  STATUS:  New                  STG 3a: 8 weeks:  The patient will demonstrate good tolerance to R  strength testing.                                  STATUS:  New  4. Carrying, Moving, and Handling Objects Functional Limitation                                    LTG 4: 12 weeks:  The patient will achieve a score of 11/55 on the Quick DASH.                                  STATUS:  Not met                  STG 4a: 6 weeks:  The patient will achieve a score of 23/55 on the Quick DASH.                                    STATUS:  Not met                        Plan  Planned modality interventions: TENS, thermotherapy (hydrocollator packs) and thermotherapy (paraffin bath)  Planned therapy interventions: manual therapy, functional ROM exercises, fine motor coordination training, flexibility, stretching, strengthening, home exercise program, neuromuscular re-education, soft tissue mobilization and therapeutic activities  Frequency: 2x week  Duration in weeks: 8  Treatment plan discussed with: patient        Visit Diagnoses:    ICD-10-CM ICD-9-CM   1. Primary osteoarthritis of first carpometacarpal joint of right hand  M18.11 715.14   2. Trigger finger of right thumb  M65.311 727.03   3. Trigger finger, right index finger  M65.321 727.03   4. Right hand pain  M79.641 729.5                Timed:  Manual Therapy:    8     mins  50413;  Therapeutic Exercise:    8     mins  43209;     Therapeutic Activity:    8     mins  83688;  Ultrasound:     0     mins  85046;    Electrical Stimulation:    0     mins 70215;  Neuromuscular Ramu:    8    mins  35534;      Timed Treatment:   32   mins   Total Treatment:     32   min    PETER Betancur/L  Occupational Therapist    Electronically signed   License number [id number]

## 2024-06-03 ENCOUNTER — TREATMENT (OUTPATIENT)
Dept: PHYSICAL THERAPY | Facility: CLINIC | Age: 59
End: 2024-06-03
Payer: COMMERCIAL

## 2024-06-03 DIAGNOSIS — M18.11 PRIMARY OSTEOARTHRITIS OF FIRST CARPOMETACARPAL JOINT OF RIGHT HAND: Primary | ICD-10-CM

## 2024-06-03 DIAGNOSIS — M65.311 TRIGGER FINGER OF RIGHT THUMB: ICD-10-CM

## 2024-06-03 DIAGNOSIS — M79.641 RIGHT HAND PAIN: ICD-10-CM

## 2024-06-03 DIAGNOSIS — M65.321 TRIGGER FINGER, RIGHT INDEX FINGER: ICD-10-CM

## 2024-06-03 PROCEDURE — 97112 NEUROMUSCULAR REEDUCATION: CPT | Performed by: PHYSICAL THERAPIST

## 2024-06-03 PROCEDURE — 97140 MANUAL THERAPY 1/> REGIONS: CPT | Performed by: PHYSICAL THERAPIST

## 2024-06-03 PROCEDURE — 97110 THERAPEUTIC EXERCISES: CPT | Performed by: PHYSICAL THERAPIST

## 2024-06-03 NOTE — PROGRESS NOTES
Occupational Therapy Daily Treatment Note  [address] [place] [address]      Patient: Gualberto Gloria   : 1965  Referring practitioner: Chito Woodard,*  Date of Initial Visit: Type: THERAPY  Noted: 2024  Today's Date: 6/3/2024  Patient seen for 3 sessions         Subjective Evaluation    Pain  Current pain ratin  At worst pain rating: 10  Location: R thumb  Quality: sharp and dull ache         Gualberto Gloria reports: it is very sore in the thumb and index finger. The index finger is still popping. It seems like it is bruising more.     Objective          Observations     Right Wrist/Hand   Positive for Heberden's nodes.     Additional Wrist/Hand Observation Details  Pt wearing prefab hand thumb spica    Tenderness     Additional Tenderness Details  Tenderness in R thumb    Neurological Testing     Sensation     Wrist/Hand     Right   Intact: light touch    Additional Neurological Details  Occasional tingling in finger tips    Active Range of Motion     Right Wrist   Wrist flexion: 60 degrees   Wrist extension: 60 degrees     Right Thumb   Flexion     MP: 40 degrees    DIP: 40 degrees  Opposition: To small finger tip    Additional Active Range of Motion Details  Full loose fist and full digital extension     Strength/Myotome Testing     Left Wrist/Hand      (2nd hand position)     Trial 1: 52 lbs    Trial 2: 56 lbs    Trial 3: 56 lbs    Average: 54.67 lbs    Right Wrist/Hand      (2nd hand position)     Trial 1: 22 lbs    Trial 2: 24 lbs    Trial 3: 22 lbs    Average: 22.67 lbs      See Exercise, Manual, and Modality Logs for complete treatment.     Light strengthening added today with good tolerance. Soft putty issued for HEP.  Assessment/Plan    Visit Diagnoses:    ICD-10-CM ICD-9-CM   1. Primary osteoarthritis of first carpometacarpal joint of right hand  M18.11 715.14   2. Trigger finger of right thumb  M65.311 727.03   3. Trigger finger, right index finger  M65.321  727.03   4. Right hand pain  M79.641 729.5       Continue per POC         Timed:  Manual Therapy:    8     mins  75327;  Therapeutic Exercise:    10     mins  98545;     Therapeutic Activity:    3     mins  99341;  Ultrasound:     0     mins  26768;    Electrical Stimulation:    0     mins 96351;  Neuromuscular Ramu:    10    mins  73216;      Timed Treatment:   31   mins   Total Treatment:     31   min    PETER Betancur/L  Occupational Therapist    Electronically signed   License number [id number]

## 2024-06-06 ENCOUNTER — TREATMENT (OUTPATIENT)
Dept: PHYSICAL THERAPY | Facility: CLINIC | Age: 59
End: 2024-06-06
Payer: COMMERCIAL

## 2024-06-06 DIAGNOSIS — M18.11 PRIMARY OSTEOARTHRITIS OF FIRST CARPOMETACARPAL JOINT OF RIGHT HAND: Primary | ICD-10-CM

## 2024-06-06 DIAGNOSIS — M65.311 TRIGGER FINGER OF RIGHT THUMB: ICD-10-CM

## 2024-06-06 DIAGNOSIS — M65.321 TRIGGER FINGER, RIGHT INDEX FINGER: ICD-10-CM

## 2024-06-06 DIAGNOSIS — M79.641 RIGHT HAND PAIN: ICD-10-CM

## 2024-06-06 NOTE — PROGRESS NOTES
Occupational Therapy Daily Treatment Note  [address] [place] [address]      Patient: Gualberto Gloria   : 1965  Referring practitioner: Chito Woodard,*  Date of Initial Visit: Type: THERAPY  Noted: 2024  Today's Date: 2024  Patient seen for 4 sessions         Subjective Evaluation    Pain  Current pain rating: 3  At worst pain rating: 10  Location: R index finger         Gualberto Gloria reports: the index finger doesn't feel right, it still pops, creeks, and hurts.  I was able to do my exercises yesterday, but Tuesday it was really hurting.     Objective   See Exercise, Manual, and Modality Logs for complete treatment.     Increased wrist strengthening exercises added today with fair tolerance. Pt continues with pain in her wrist and hand.    Assessment/Plan    Visit Diagnoses:    ICD-10-CM ICD-9-CM   1. Primary osteoarthritis of first carpometacarpal joint of right hand  M18.11 715.14   2. Trigger finger of right thumb  M65.311 727.03   3. Trigger finger, right index finger  M65.321 727.03   4. Right hand pain  M79.641 729.5       Continue per POC for range of motion and light strengthening to increase her functional use of her R hand for ADL's/IADL's         Timed:  Manual Therapy:    8     mins  05867;  Therapeutic Exercise:    8     mins  43062;     Therapeutic Activity:    8     mins  43212;  Ultrasound:     0     mins  28353;    Electrical Stimulation:    0     mins 84042;  Neuromuscular Ramu:    8    mins  87813;      Timed Treatment:   32   mins   Total Treatment:     32   min    PETER Betancur/L  Occupational Therapist    Electronically signed   License number [id number]

## 2024-06-10 ENCOUNTER — TREATMENT (OUTPATIENT)
Dept: PHYSICAL THERAPY | Facility: CLINIC | Age: 59
End: 2024-06-10
Payer: COMMERCIAL

## 2024-06-10 DIAGNOSIS — M65.311 TRIGGER FINGER OF RIGHT THUMB: ICD-10-CM

## 2024-06-10 DIAGNOSIS — M18.11 PRIMARY OSTEOARTHRITIS OF FIRST CARPOMETACARPAL JOINT OF RIGHT HAND: Primary | ICD-10-CM

## 2024-06-10 DIAGNOSIS — M79.641 RIGHT HAND PAIN: ICD-10-CM

## 2024-06-10 DIAGNOSIS — M65.321 TRIGGER FINGER, RIGHT INDEX FINGER: ICD-10-CM

## 2024-06-10 PROCEDURE — 97530 THERAPEUTIC ACTIVITIES: CPT | Performed by: PHYSICAL THERAPIST

## 2024-06-10 PROCEDURE — 97110 THERAPEUTIC EXERCISES: CPT | Performed by: PHYSICAL THERAPIST

## 2024-06-10 PROCEDURE — 97112 NEUROMUSCULAR REEDUCATION: CPT | Performed by: PHYSICAL THERAPIST

## 2024-06-10 NOTE — PROGRESS NOTES
Occupational Therapy Daily Treatment Note  [address] [place] [address]      Patient: Gualberto Gloria   : 1965  Referring practitioner: Chito Woodard,*  Date of Initial Visit: Type: THERAPY  Noted: 2024  Today's Date: 6/10/2024  Patient seen for 5 sessions         Subjective Evaluation    Pain  Current pain ratin  Location: R hand         Gualberto Gloria reports: last night it was aching and so stiff.    Objective          Observations     Right Wrist/Hand   Positive for Heberden's nodes.     Additional Wrist/Hand Observation Details  Pt wearing prefab hand thumb spica    Tenderness     Additional Tenderness Details  Tenderness in R thumb    Neurological Testing     Sensation     Wrist/Hand     Right   Intact: light touch    Additional Neurological Details  Occasional tingling in finger tips    Active Range of Motion     Right Wrist   Wrist flexion: 60 degrees   Wrist extension: 60 degrees     Right Thumb   Flexion     MP: 40 degrees    DIP: 45 degrees  Opposition: To small finger tip    Additional Active Range of Motion Details  Full loose fist and full digital extension     Strength/Myotome Testing     Left Wrist/Hand      (2nd hand position)     Trial 1: 52 lbs    Trial 2: 56 lbs    Trial 3: 56 lbs    Average: 54.67 lbs    Right Wrist/Hand      (2nd hand position)     Trial 1: 22 lbs    Trial 2: 25 lbs    Trial 3: 25 lbs    Average: 24 lbs      See Exercise, Manual, and Modality Logs for complete treatment.     Increased resistance to 2 lb for wrist 3 planes with good tolerance.   Assessment/Plan    Visit Diagnoses:    ICD-10-CM ICD-9-CM   1. Primary osteoarthritis of first carpometacarpal joint of right hand  M18.11 715.14   2. Trigger finger of right thumb  M65.311 727.03   3. Trigger finger, right index finger  M65.321 727.03   4. Right hand pain  M79.641 729.5       Increased thumb IP flexion and  strength today. Continue per POC         Timed:  Manual  Therapy:    5     mins  88507;  Therapeutic Exercise:    10     mins  80048;     Therapeutic Activity:    8     mins  84921;  Ultrasound:     0     mins  86105;    Electrical Stimulation:    0     mins 76470;  Neuromuscular Ramu:    8    mins  70202;      Timed Treatment:   31   mins   Total Treatment:     31   min    PETER Betancur/L  Occupational Therapist    Electronically signed   License number [id number]

## 2024-06-12 ENCOUNTER — TREATMENT (OUTPATIENT)
Dept: PHYSICAL THERAPY | Facility: CLINIC | Age: 59
End: 2024-06-12
Payer: COMMERCIAL

## 2024-06-12 DIAGNOSIS — M65.321 TRIGGER FINGER, RIGHT INDEX FINGER: ICD-10-CM

## 2024-06-12 DIAGNOSIS — M65.311 TRIGGER FINGER OF RIGHT THUMB: ICD-10-CM

## 2024-06-12 DIAGNOSIS — M18.11 PRIMARY OSTEOARTHRITIS OF FIRST CARPOMETACARPAL JOINT OF RIGHT HAND: Primary | ICD-10-CM

## 2024-06-12 DIAGNOSIS — M79.641 RIGHT HAND PAIN: ICD-10-CM

## 2024-06-12 NOTE — PROGRESS NOTES
Occupational Therapy Daily Treatment Note  [address] [place] [id number]      Patient: Gualberto Gloria   : 1965  Referring practitioner: Chito Woodard,*  Date of Initial Visit: Type: THERAPY  Noted: 2024  Today's Date: 2024  Patient seen for 6 sessions         Subjective Evaluation    Pain  Current pain ratin  At worst pain rating: 10  Location: R hand         Gualberto Gloria reports: by the end of the day it hurts so bad. It is so depressing.    Objective   See Exercise, Manual, and Modality Logs for complete treatment.   Pt reports most of her pain along index finger especially when performing fine motor activities. She continues with popping sometime but not fully triggering.     Assessment/Plan    Visit Diagnoses:    ICD-10-CM ICD-9-CM   1. Primary osteoarthritis of first carpometacarpal joint of right hand  M18.11 715.14   2. Trigger finger of right thumb  M65.311 727.03   3. Trigger finger, right index finger  M65.321 727.03   4. Right hand pain  M79.641 729.5       Continue per POC for pain control, range of motion and strengthening as tolerated.         Timed:  Manual Therapy:    0     mins  31907;  Therapeutic Exercise:    10     mins  48533;     Therapeutic Activity:    10     mins  02739;  Ultrasound:     0     mins  07944;    Electrical Stimulation:    0     mins 77982;  Neuromuscular Ramu:    10    mins  67616;      Timed Treatment:   30   mins   Total Treatment:     30   min    PETER Betancur/L  Occupational Therapist    Electronically signed   License number [id number]

## 2024-06-13 ENCOUNTER — OFFICE VISIT (OUTPATIENT)
Dept: INTERNAL MEDICINE | Age: 59
End: 2024-06-13
Payer: COMMERCIAL

## 2024-06-13 VITALS
BODY MASS INDEX: 32.15 KG/M2 | HEIGHT: 65 IN | WEIGHT: 193 LBS | HEART RATE: 71 BPM | TEMPERATURE: 98.2 F | OXYGEN SATURATION: 94 % | DIASTOLIC BLOOD PRESSURE: 84 MMHG | SYSTOLIC BLOOD PRESSURE: 118 MMHG

## 2024-06-13 DIAGNOSIS — F32.89 OTHER DEPRESSION: ICD-10-CM

## 2024-06-13 DIAGNOSIS — R73.01 IFG (IMPAIRED FASTING GLUCOSE): ICD-10-CM

## 2024-06-13 DIAGNOSIS — E03.8 OTHER SPECIFIED HYPOTHYROIDISM: ICD-10-CM

## 2024-06-13 DIAGNOSIS — E03.8 HYPOTHYROIDISM DUE TO HASHIMOTO'S THYROIDITIS: ICD-10-CM

## 2024-06-13 DIAGNOSIS — R91.8 INDETERMINATE PULMONARY NODULES: ICD-10-CM

## 2024-06-13 DIAGNOSIS — E53.8 VITAMIN B12 DEFICIENCY: ICD-10-CM

## 2024-06-13 DIAGNOSIS — Z98.890 H/O HAND SURGERY: ICD-10-CM

## 2024-06-13 DIAGNOSIS — N18.31 STAGE 3A CHRONIC KIDNEY DISEASE: ICD-10-CM

## 2024-06-13 DIAGNOSIS — M19.041 PRIMARY OSTEOARTHRITIS OF BOTH HANDS: ICD-10-CM

## 2024-06-13 DIAGNOSIS — K22.70 BARRETT'S ESOPHAGUS WITHOUT DYSPLASIA: ICD-10-CM

## 2024-06-13 DIAGNOSIS — I10 ESSENTIAL HYPERTENSION: ICD-10-CM

## 2024-06-13 DIAGNOSIS — E78.2 MIXED HYPERLIPIDEMIA: ICD-10-CM

## 2024-06-13 DIAGNOSIS — M19.042 PRIMARY OSTEOARTHRITIS OF BOTH HANDS: ICD-10-CM

## 2024-06-13 DIAGNOSIS — I10 PRIMARY HYPERTENSION: ICD-10-CM

## 2024-06-13 DIAGNOSIS — M79.641 HAND PAIN, RIGHT: Primary | ICD-10-CM

## 2024-06-13 DIAGNOSIS — E06.3 HYPOTHYROIDISM DUE TO HASHIMOTO'S THYROIDITIS: ICD-10-CM

## 2024-06-13 PROCEDURE — 99214 OFFICE O/P EST MOD 30 MIN: CPT | Performed by: INTERNAL MEDICINE

## 2024-06-13 RX ORDER — PAROXETINE HYDROCHLORIDE 40 MG/1
40 TABLET, FILM COATED ORAL EVERY MORNING
Qty: 90 TABLET | Refills: 3 | Status: SHIPPED | OUTPATIENT
Start: 2024-06-13 | End: 2024-06-13

## 2024-06-13 RX ORDER — LEVOTHYROXINE SODIUM 0.03 MG/1
25 TABLET ORAL DAILY
Qty: 90 TABLET | Refills: 3 | Status: SHIPPED | OUTPATIENT
Start: 2024-06-13

## 2024-06-13 RX ORDER — IBUPROFEN 800 MG/1
800 TABLET ORAL EVERY 6 HOURS PRN
COMMUNITY
Start: 2024-05-20

## 2024-06-13 RX ORDER — LISINOPRIL AND HYDROCHLOROTHIAZIDE 20; 12.5 MG/1; MG/1
1 TABLET ORAL DAILY
Qty: 90 TABLET | Refills: 3 | Status: SHIPPED | OUTPATIENT
Start: 2024-06-13

## 2024-06-13 RX ORDER — DEXAMETHASONE 4 MG/1
4 TABLET ORAL 3 TIMES DAILY
Qty: 15 TABLET | Refills: 0 | Status: SHIPPED | OUTPATIENT
Start: 2024-06-13

## 2024-06-13 RX ORDER — ATORVASTATIN CALCIUM 20 MG/1
20 TABLET, FILM COATED ORAL DAILY
Qty: 90 TABLET | Refills: 3 | Status: SHIPPED | OUTPATIENT
Start: 2024-06-13

## 2024-06-13 RX ORDER — OMEPRAZOLE 40 MG/1
40 CAPSULE, DELAYED RELEASE ORAL DAILY
Qty: 90 CAPSULE | Refills: 3 | Status: SHIPPED | OUTPATIENT
Start: 2024-06-13

## 2024-06-13 RX ORDER — ACETAMINOPHEN AND CODEINE PHOSPHATE 300; 30 MG/1; MG/1
1 TABLET ORAL EVERY 6 HOURS
COMMUNITY
Start: 2024-05-08

## 2024-06-13 RX ORDER — DULOXETIN HYDROCHLORIDE 60 MG/1
60 CAPSULE, DELAYED RELEASE ORAL DAILY
Qty: 30 CAPSULE | Refills: 5 | Status: SHIPPED | OUTPATIENT
Start: 2024-06-13

## 2024-06-13 NOTE — PROGRESS NOTES
"CHIEF COMPLAINT/ HPI:  Hyperlipidemia (Routine follow up, Lab follow up (2 months ago). Med refill. Pt states rt hand pain since having surgery. Pt is still doing PT , worse pain is at night. )              Objective   Vital Signs  Vitals:    06/13/24 1041   BP: 118/84   Pulse: 71   Temp: 98.2 °F (36.8 °C)   SpO2: 94%   Weight: 87.5 kg (193 lb)   Height: 165.2 cm (65.04\")      Body mass index is 32.08 kg/m².  Review of Systems --pain, constant, still with r index finger pain    Physical Exam  Constitutional:       General: She is not in acute distress.     Appearance: Normal appearance.   HENT:      Head: Normocephalic.      Mouth/Throat:      Mouth: Mucous membranes are moist.   Eyes:      Conjunctiva/sclera: Conjunctivae normal.      Pupils: Pupils are equal, round, and reactive to light.   Cardiovascular:      Rate and Rhythm: Normal rate and regular rhythm.      Pulses: Normal pulses.      Heart sounds: Normal heart sounds.   Pulmonary:      Effort: Pulmonary effort is normal.      Breath sounds: Normal breath sounds.   Abdominal:      General: Abdomen is flat. Bowel sounds are normal.      Palpations: Abdomen is soft.   Musculoskeletal:         General: No swelling. Normal range of motion.      Cervical back: Neck supple.   Skin:     General: Skin is warm and dry.      Coloration: Skin is not jaundiced.   Neurological:      General: No focal deficit present.      Mental Status: She is alert and oriented to person, place, and time. Mental status is at baseline.   Psychiatric:         Mood and Affect: Mood normal.         Behavior: Behavior normal.         Thought Content: Thought content normal.         Judgment: Judgment normal.        Result Review :   No results found for: \"PROBNP\", \"BNP\"  CMP          12/11/2023    09:05 3/27/2024    11:29   CMP   Glucose 95  92    BUN 16  16    Creatinine 1.00  1.02    EGFR 65.0  63.5    Sodium 135  138    Potassium 4.3  4.0    Chloride 97  102    Calcium 9.9  9.3    Total " Protein 7.2  6.4    Albumin 4.6  4.2    Globulin 2.6  2.2    Total Bilirubin 0.4  0.3    Alkaline Phosphatase 125  91    AST (SGOT) 21  20    ALT (SGPT) 14  19    Albumin/Globulin Ratio 1.8  1.9    BUN/Creatinine Ratio 16.0  15.7    Anion Gap 11.0  10.0      CBC w/diff          12/11/2023    09:05 3/20/2024    14:44   CBC w/Diff   WBC 7.60  5.87    RBC 4.60  4.08    Hemoglobin 14.3  13.0    Hematocrit 42.6  37.9    MCV 92.6  92.9    MCH 31.1  31.9    MCHC 33.6  34.3    RDW 13.0  13.0    Platelets 301  251    Neutrophil Rel % 57.5  57.5    Immature Granulocyte Rel % 0.5  0.2    Lymphocyte Rel % 30.8  30.5    Monocyte Rel % 8.8  8.9    Eosinophil Rel % 1.7  2.2    Basophil Rel % 0.7  0.7       Lipid Panel          2/6/2024    09:14   Lipid Panel   Total Cholesterol 186    Triglycerides 95    HDL Cholesterol 64    VLDL Cholesterol 17    LDL Cholesterol  105    LDL/HDL Ratio 1.61       Lab Results   Component Value Date    TSH 2.860 12/11/2023    TSH 3.120 11/16/2022    TSH 2.330 12/02/2021      Lab Results   Component Value Date    FREET4 1.36 12/11/2023    FREET4 1.29 11/16/2022    FREET4 1.34 12/02/2021      A1C Last 3 Results          12/11/2023    09:05   HGBA1C Last 3 Results   Hemoglobin A1C 6.30                        Visit Diagnoses:    ICD-10-CM ICD-9-CM   1. Hand pain, right  M79.641 729.5   2. Mixed hyperlipidemia  E78.2 272.2   3. Other specified hypothyroidism  E03.8 244.8   4. Essential hypertension  I10 401.9   5. Other depression  F32.89 311   6. Indeterminate pulmonary nodules  R91.8 793.19   7. Johnson's esophagus without dysplasia  K22.70 530.85   8. Vitamin B12 deficiency  E53.8 266.2   9. Primary hypertension  I10 401.9   10. Hypothyroidism due to Hashimoto's thyroiditis  E03.8 244.8    E06.3 245.2   11. IFG (impaired fasting glucose)  R73.01 790.21   12. Stage 3a chronic kidney disease  N18.31 585.3   13. Primary osteoarthritis of both hands  M19.041 715.14    M19.042    14. H/O hand surgery   Z98.890 V15.29       Assessment and Plan   Diagnoses and all orders for this visit:    1. Hand pain, right (Primary)  -     atorvastatin (LIPITOR) 20 MG tablet; Take 1 tablet by mouth Daily.  Dispense: 90 tablet; Refill: 3  -     levothyroxine (SYNTHROID, LEVOTHROID) 25 MCG tablet; Take 1 tablet by mouth Daily.  Dispense: 90 tablet; Refill: 3  -     lisinopril-hydrochlorothiazide (PRINZIDE,ZESTORETIC) 20-12.5 MG per tablet; Take 1 tablet by mouth Daily.  Dispense: 90 tablet; Refill: 3  -     omeprazole (priLOSEC) 40 MG capsule; Take 1 capsule by mouth Daily.  Dispense: 90 capsule; Refill: 3  -     PARoxetine (PAXIL) 40 MG tablet; Take 1 tablet by mouth Every Morning.  Dispense: 90 tablet; Refill: 3  -     CBC & Differential; Future  -     Comprehensive Metabolic Panel; Future  -     Vitamin D,25-Hydroxy; Future  -     Vitamin B12 anemia; Future  -     Folate anemia; Future  -     TSH+Free T4; Future  -     Sedimentation Rate; Future  -     Lipid Panel; Future  -     Hemoglobin A1c; Future  -     Rheumatoid Arthritis (RA) Profile; Future  -     Uric acid; Future  -     Antistreptolysin O screen; Future  -     C-reactive protein; Future  -     Rheumatoid Factor; Future  -     ELANA; Future    2. Mixed hyperlipidemia  -     atorvastatin (LIPITOR) 20 MG tablet; Take 1 tablet by mouth Daily.  Dispense: 90 tablet; Refill: 3  -     levothyroxine (SYNTHROID, LEVOTHROID) 25 MCG tablet; Take 1 tablet by mouth Daily.  Dispense: 90 tablet; Refill: 3  -     lisinopril-hydrochlorothiazide (PRINZIDE,ZESTORETIC) 20-12.5 MG per tablet; Take 1 tablet by mouth Daily.  Dispense: 90 tablet; Refill: 3  -     omeprazole (priLOSEC) 40 MG capsule; Take 1 capsule by mouth Daily.  Dispense: 90 capsule; Refill: 3  -     PARoxetine (PAXIL) 40 MG tablet; Take 1 tablet by mouth Every Morning.  Dispense: 90 tablet; Refill: 3  -     CBC & Differential; Future  -     Comprehensive Metabolic Panel; Future  -     Vitamin D,25-Hydroxy; Future  -      Vitamin B12 anemia; Future  -     Folate anemia; Future  -     TSH+Free T4; Future  -     Sedimentation Rate; Future  -     Lipid Panel; Future  -     Hemoglobin A1c; Future  -     Rheumatoid Arthritis (RA) Profile; Future  -     Uric acid; Future  -     Antistreptolysin O screen; Future  -     C-reactive protein; Future  -     Rheumatoid Factor; Future  -     ELANA; Future    3. Other specified hypothyroidism  -     atorvastatin (LIPITOR) 20 MG tablet; Take 1 tablet by mouth Daily.  Dispense: 90 tablet; Refill: 3  -     levothyroxine (SYNTHROID, LEVOTHROID) 25 MCG tablet; Take 1 tablet by mouth Daily.  Dispense: 90 tablet; Refill: 3  -     lisinopril-hydrochlorothiazide (PRINZIDE,ZESTORETIC) 20-12.5 MG per tablet; Take 1 tablet by mouth Daily.  Dispense: 90 tablet; Refill: 3  -     omeprazole (priLOSEC) 40 MG capsule; Take 1 capsule by mouth Daily.  Dispense: 90 capsule; Refill: 3  -     PARoxetine (PAXIL) 40 MG tablet; Take 1 tablet by mouth Every Morning.  Dispense: 90 tablet; Refill: 3  -     CBC & Differential; Future  -     Comprehensive Metabolic Panel; Future  -     Vitamin D,25-Hydroxy; Future  -     Vitamin B12 anemia; Future  -     Folate anemia; Future  -     TSH+Free T4; Future  -     Sedimentation Rate; Future  -     Lipid Panel; Future  -     Hemoglobin A1c; Future  -     Rheumatoid Arthritis (RA) Profile; Future  -     Uric acid; Future  -     Antistreptolysin O screen; Future  -     C-reactive protein; Future  -     Rheumatoid Factor; Future  -     ELANA; Future    4. Essential hypertension  -     atorvastatin (LIPITOR) 20 MG tablet; Take 1 tablet by mouth Daily.  Dispense: 90 tablet; Refill: 3  -     levothyroxine (SYNTHROID, LEVOTHROID) 25 MCG tablet; Take 1 tablet by mouth Daily.  Dispense: 90 tablet; Refill: 3  -     lisinopril-hydrochlorothiazide (PRINZIDE,ZESTORETIC) 20-12.5 MG per tablet; Take 1 tablet by mouth Daily.  Dispense: 90 tablet; Refill: 3  -     omeprazole (priLOSEC) 40 MG capsule;  Take 1 capsule by mouth Daily.  Dispense: 90 capsule; Refill: 3  -     PARoxetine (PAXIL) 40 MG tablet; Take 1 tablet by mouth Every Morning.  Dispense: 90 tablet; Refill: 3  -     CBC & Differential; Future  -     Comprehensive Metabolic Panel; Future  -     Vitamin D,25-Hydroxy; Future  -     Vitamin B12 anemia; Future  -     Folate anemia; Future  -     TSH+Free T4; Future  -     Sedimentation Rate; Future  -     Lipid Panel; Future  -     Hemoglobin A1c; Future  -     Rheumatoid Arthritis (RA) Profile; Future  -     Uric acid; Future  -     Antistreptolysin O screen; Future  -     C-reactive protein; Future  -     Rheumatoid Factor; Future  -     ELANA; Future    5. Other depression  -     atorvastatin (LIPITOR) 20 MG tablet; Take 1 tablet by mouth Daily.  Dispense: 90 tablet; Refill: 3  -     levothyroxine (SYNTHROID, LEVOTHROID) 25 MCG tablet; Take 1 tablet by mouth Daily.  Dispense: 90 tablet; Refill: 3  -     lisinopril-hydrochlorothiazide (PRINZIDE,ZESTORETIC) 20-12.5 MG per tablet; Take 1 tablet by mouth Daily.  Dispense: 90 tablet; Refill: 3  -     omeprazole (priLOSEC) 40 MG capsule; Take 1 capsule by mouth Daily.  Dispense: 90 capsule; Refill: 3  -     PARoxetine (PAXIL) 40 MG tablet; Take 1 tablet by mouth Every Morning.  Dispense: 90 tablet; Refill: 3  -     CBC & Differential; Future  -     Comprehensive Metabolic Panel; Future  -     Vitamin D,25-Hydroxy; Future  -     Vitamin B12 anemia; Future  -     Folate anemia; Future  -     TSH+Free T4; Future  -     Sedimentation Rate; Future  -     Lipid Panel; Future  -     Hemoglobin A1c; Future  -     Rheumatoid Arthritis (RA) Profile; Future  -     Uric acid; Future  -     Antistreptolysin O screen; Future  -     C-reactive protein; Future  -     Rheumatoid Factor; Future  -     ELANA; Future    6. Indeterminate pulmonary nodules  -     atorvastatin (LIPITOR) 20 MG tablet; Take 1 tablet by mouth Daily.  Dispense: 90 tablet; Refill: 3  -     levothyroxine  (SYNTHROID, LEVOTHROID) 25 MCG tablet; Take 1 tablet by mouth Daily.  Dispense: 90 tablet; Refill: 3  -     lisinopril-hydrochlorothiazide (PRINZIDE,ZESTORETIC) 20-12.5 MG per tablet; Take 1 tablet by mouth Daily.  Dispense: 90 tablet; Refill: 3  -     omeprazole (priLOSEC) 40 MG capsule; Take 1 capsule by mouth Daily.  Dispense: 90 capsule; Refill: 3  -     PARoxetine (PAXIL) 40 MG tablet; Take 1 tablet by mouth Every Morning.  Dispense: 90 tablet; Refill: 3  -     CBC & Differential; Future  -     Comprehensive Metabolic Panel; Future  -     Vitamin D,25-Hydroxy; Future  -     Vitamin B12 anemia; Future  -     Folate anemia; Future  -     TSH+Free T4; Future  -     Sedimentation Rate; Future  -     Lipid Panel; Future  -     Hemoglobin A1c; Future  -     Rheumatoid Arthritis (RA) Profile; Future  -     Uric acid; Future  -     Antistreptolysin O screen; Future  -     C-reactive protein; Future  -     Rheumatoid Factor; Future  -     ELANA; Future    7. Johnson's esophagus without dysplasia  -     atorvastatin (LIPITOR) 20 MG tablet; Take 1 tablet by mouth Daily.  Dispense: 90 tablet; Refill: 3  -     levothyroxine (SYNTHROID, LEVOTHROID) 25 MCG tablet; Take 1 tablet by mouth Daily.  Dispense: 90 tablet; Refill: 3  -     lisinopril-hydrochlorothiazide (PRINZIDE,ZESTORETIC) 20-12.5 MG per tablet; Take 1 tablet by mouth Daily.  Dispense: 90 tablet; Refill: 3  -     omeprazole (priLOSEC) 40 MG capsule; Take 1 capsule by mouth Daily.  Dispense: 90 capsule; Refill: 3  -     PARoxetine (PAXIL) 40 MG tablet; Take 1 tablet by mouth Every Morning.  Dispense: 90 tablet; Refill: 3  -     CBC & Differential; Future  -     Comprehensive Metabolic Panel; Future  -     Vitamin D,25-Hydroxy; Future  -     Vitamin B12 anemia; Future  -     Folate anemia; Future  -     TSH+Free T4; Future  -     Sedimentation Rate; Future  -     Lipid Panel; Future  -     Hemoglobin A1c; Future  -     Rheumatoid Arthritis (RA) Profile; Future  -      Uric acid; Future  -     Antistreptolysin O screen; Future  -     C-reactive protein; Future  -     Rheumatoid Factor; Future  -     ELANA; Future    8. Vitamin B12 deficiency  -     atorvastatin (LIPITOR) 20 MG tablet; Take 1 tablet by mouth Daily.  Dispense: 90 tablet; Refill: 3  -     levothyroxine (SYNTHROID, LEVOTHROID) 25 MCG tablet; Take 1 tablet by mouth Daily.  Dispense: 90 tablet; Refill: 3  -     lisinopril-hydrochlorothiazide (PRINZIDE,ZESTORETIC) 20-12.5 MG per tablet; Take 1 tablet by mouth Daily.  Dispense: 90 tablet; Refill: 3  -     omeprazole (priLOSEC) 40 MG capsule; Take 1 capsule by mouth Daily.  Dispense: 90 capsule; Refill: 3  -     PARoxetine (PAXIL) 40 MG tablet; Take 1 tablet by mouth Every Morning.  Dispense: 90 tablet; Refill: 3  -     CBC & Differential; Future  -     Comprehensive Metabolic Panel; Future  -     Vitamin D,25-Hydroxy; Future  -     Vitamin B12 anemia; Future  -     Folate anemia; Future  -     TSH+Free T4; Future  -     Sedimentation Rate; Future  -     Lipid Panel; Future  -     Hemoglobin A1c; Future  -     Rheumatoid Arthritis (RA) Profile; Future  -     Uric acid; Future  -     Antistreptolysin O screen; Future  -     C-reactive protein; Future  -     Rheumatoid Factor; Future  -     ELANA; Future    9. Primary hypertension  -     atorvastatin (LIPITOR) 20 MG tablet; Take 1 tablet by mouth Daily.  Dispense: 90 tablet; Refill: 3  -     levothyroxine (SYNTHROID, LEVOTHROID) 25 MCG tablet; Take 1 tablet by mouth Daily.  Dispense: 90 tablet; Refill: 3  -     lisinopril-hydrochlorothiazide (PRINZIDE,ZESTORETIC) 20-12.5 MG per tablet; Take 1 tablet by mouth Daily.  Dispense: 90 tablet; Refill: 3  -     omeprazole (priLOSEC) 40 MG capsule; Take 1 capsule by mouth Daily.  Dispense: 90 capsule; Refill: 3  -     PARoxetine (PAXIL) 40 MG tablet; Take 1 tablet by mouth Every Morning.  Dispense: 90 tablet; Refill: 3  -     CBC & Differential; Future  -     Comprehensive Metabolic  Panel; Future  -     Vitamin D,25-Hydroxy; Future  -     Vitamin B12 anemia; Future  -     Folate anemia; Future  -     TSH+Free T4; Future  -     Sedimentation Rate; Future  -     Lipid Panel; Future  -     Hemoglobin A1c; Future  -     Rheumatoid Arthritis (RA) Profile; Future  -     Uric acid; Future  -     Antistreptolysin O screen; Future  -     C-reactive protein; Future  -     Rheumatoid Factor; Future  -     ELANA; Future    10. Hypothyroidism due to Hashimoto's thyroiditis  -     atorvastatin (LIPITOR) 20 MG tablet; Take 1 tablet by mouth Daily.  Dispense: 90 tablet; Refill: 3  -     levothyroxine (SYNTHROID, LEVOTHROID) 25 MCG tablet; Take 1 tablet by mouth Daily.  Dispense: 90 tablet; Refill: 3  -     lisinopril-hydrochlorothiazide (PRINZIDE,ZESTORETIC) 20-12.5 MG per tablet; Take 1 tablet by mouth Daily.  Dispense: 90 tablet; Refill: 3  -     omeprazole (priLOSEC) 40 MG capsule; Take 1 capsule by mouth Daily.  Dispense: 90 capsule; Refill: 3  -     PARoxetine (PAXIL) 40 MG tablet; Take 1 tablet by mouth Every Morning.  Dispense: 90 tablet; Refill: 3  -     CBC & Differential; Future  -     Comprehensive Metabolic Panel; Future  -     Vitamin D,25-Hydroxy; Future  -     Vitamin B12 anemia; Future  -     Folate anemia; Future  -     TSH+Free T4; Future  -     Sedimentation Rate; Future  -     Lipid Panel; Future  -     Hemoglobin A1c; Future  -     Rheumatoid Arthritis (RA) Profile; Future  -     Uric acid; Future  -     Antistreptolysin O screen; Future  -     C-reactive protein; Future  -     Rheumatoid Factor; Future  -     ELANA; Future    11. IFG (impaired fasting glucose)  -     atorvastatin (LIPITOR) 20 MG tablet; Take 1 tablet by mouth Daily.  Dispense: 90 tablet; Refill: 3  -     levothyroxine (SYNTHROID, LEVOTHROID) 25 MCG tablet; Take 1 tablet by mouth Daily.  Dispense: 90 tablet; Refill: 3  -     lisinopril-hydrochlorothiazide (PRINZIDE,ZESTORETIC) 20-12.5 MG per tablet; Take 1 tablet by mouth  Daily.  Dispense: 90 tablet; Refill: 3  -     omeprazole (priLOSEC) 40 MG capsule; Take 1 capsule by mouth Daily.  Dispense: 90 capsule; Refill: 3  -     PARoxetine (PAXIL) 40 MG tablet; Take 1 tablet by mouth Every Morning.  Dispense: 90 tablet; Refill: 3  -     CBC & Differential; Future  -     Comprehensive Metabolic Panel; Future  -     Vitamin D,25-Hydroxy; Future  -     Vitamin B12 anemia; Future  -     Folate anemia; Future  -     TSH+Free T4; Future  -     Sedimentation Rate; Future  -     Lipid Panel; Future  -     Hemoglobin A1c; Future  -     Rheumatoid Arthritis (RA) Profile; Future  -     Uric acid; Future  -     Antistreptolysin O screen; Future  -     C-reactive protein; Future  -     Rheumatoid Factor; Future  -     ELANA; Future    12. Stage 3a chronic kidney disease  -     atorvastatin (LIPITOR) 20 MG tablet; Take 1 tablet by mouth Daily.  Dispense: 90 tablet; Refill: 3  -     levothyroxine (SYNTHROID, LEVOTHROID) 25 MCG tablet; Take 1 tablet by mouth Daily.  Dispense: 90 tablet; Refill: 3  -     lisinopril-hydrochlorothiazide (PRINZIDE,ZESTORETIC) 20-12.5 MG per tablet; Take 1 tablet by mouth Daily.  Dispense: 90 tablet; Refill: 3  -     omeprazole (priLOSEC) 40 MG capsule; Take 1 capsule by mouth Daily.  Dispense: 90 capsule; Refill: 3  -     PARoxetine (PAXIL) 40 MG tablet; Take 1 tablet by mouth Every Morning.  Dispense: 90 tablet; Refill: 3  -     CBC & Differential; Future  -     Comprehensive Metabolic Panel; Future  -     Vitamin D,25-Hydroxy; Future  -     Vitamin B12 anemia; Future  -     Folate anemia; Future  -     TSH+Free T4; Future  -     Sedimentation Rate; Future  -     Lipid Panel; Future  -     Hemoglobin A1c; Future  -     Rheumatoid Arthritis (RA) Profile; Future  -     Uric acid; Future  -     Antistreptolysin O screen; Future  -     C-reactive protein; Future  -     Rheumatoid Factor; Future  -     ELANA; Future    13. Primary osteoarthritis of both hands  -     atorvastatin  (LIPITOR) 20 MG tablet; Take 1 tablet by mouth Daily.  Dispense: 90 tablet; Refill: 3  -     levothyroxine (SYNTHROID, LEVOTHROID) 25 MCG tablet; Take 1 tablet by mouth Daily.  Dispense: 90 tablet; Refill: 3  -     lisinopril-hydrochlorothiazide (PRINZIDE,ZESTORETIC) 20-12.5 MG per tablet; Take 1 tablet by mouth Daily.  Dispense: 90 tablet; Refill: 3  -     omeprazole (priLOSEC) 40 MG capsule; Take 1 capsule by mouth Daily.  Dispense: 90 capsule; Refill: 3  -     PARoxetine (PAXIL) 40 MG tablet; Take 1 tablet by mouth Every Morning.  Dispense: 90 tablet; Refill: 3  -     CBC & Differential; Future  -     Comprehensive Metabolic Panel; Future  -     Vitamin D,25-Hydroxy; Future  -     Vitamin B12 anemia; Future  -     Folate anemia; Future  -     TSH+Free T4; Future  -     Sedimentation Rate; Future  -     Lipid Panel; Future  -     Hemoglobin A1c; Future  -     Rheumatoid Arthritis (RA) Profile; Future  -     Uric acid; Future  -     Antistreptolysin O screen; Future  -     C-reactive protein; Future  -     Rheumatoid Factor; Future  -     ELANA; Future    14. H/O hand surgery  -     atorvastatin (LIPITOR) 20 MG tablet; Take 1 tablet by mouth Daily.  Dispense: 90 tablet; Refill: 3  -     levothyroxine (SYNTHROID, LEVOTHROID) 25 MCG tablet; Take 1 tablet by mouth Daily.  Dispense: 90 tablet; Refill: 3  -     lisinopril-hydrochlorothiazide (PRINZIDE,ZESTORETIC) 20-12.5 MG per tablet; Take 1 tablet by mouth Daily.  Dispense: 90 tablet; Refill: 3  -     omeprazole (priLOSEC) 40 MG capsule; Take 1 capsule by mouth Daily.  Dispense: 90 capsule; Refill: 3  -     PARoxetine (PAXIL) 40 MG tablet; Take 1 tablet by mouth Every Morning.  Dispense: 90 tablet; Refill: 3  -     CBC & Differential; Future  -     Comprehensive Metabolic Panel; Future  -     Vitamin D,25-Hydroxy; Future  -     Vitamin B12 anemia; Future  -     Folate anemia; Future  -     TSH+Free T4; Future  -     Sedimentation Rate; Future  -     Lipid Panel;  Future  -     Hemoglobin A1c; Future  -     Rheumatoid Arthritis (RA) Profile; Future  -     Uric acid; Future  -     Antistreptolysin O screen; Future  -     C-reactive protein; Future  -     Rheumatoid Factor; Future  -     ELANA; Future    Other orders  -     DULoxetine (Cymbalta) 60 MG capsule; Take 1 capsule by mouth Daily.  Dispense: 30 capsule; Refill: 5  -     dexAMETHasone (DECADRON) 4 MG tablet; Take 1 tablet by mouth 3 times a day.  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 Woodard in [place], this is definitely a work related injury and would qualify and should qualify through Worker's Comp. claims with her work,--- patient previously had steroid shots in September 2023 and again in February 2024,, patient's hand pain has gotten worse and worse due to her job occupation despite getting steroid shots in September and even again in February of this past year,    Preop clearance for upcoming hand surgery by orthopedics, she needs an EKG and a CMP done, EKG was performed today showing a sinus rhythm normal EKG, March 27, 2024--- patient says meloxicam does not help, orders were to be faxed to [phone] ----- Dr. Campos gave her some gabapentin 300 mg up to 3 times a day, not helping either, in addition to Mobic,------- will send in some Decadron, until patient can get into surgery, March 27, 2024,     Oral HSV 1,, uses oral Valtrex as needed     abdominal  pains May 9, 2023 --- ultrasound of the gallbladder was performed Su 15, 2023, ultrasound shows pancreas obscured by bowel gas no evidence of gallstones or cholecystitis, no biliary ductal dilation, nuclear medicine HIDA scan, normal examination Su 15, 2023 Margarito, labs noted normal liver function,     Arthritic pain, shoulder pain, will refill ibuprofen 800 mg 3 times daily as needed, also tried Mobic which did not help,     Mixed hyperlipidemia continues  Crestor 20 mg daily,     Coronary artery calcifications  CT scan ----discussed---- cardiac risks atherosclerosis etc. changing statins continue, December 16, 2022      Hypothyroidism continues levothyroxine 25 mcg daily     Depression, continues Paxil daily,--- consider switching to Cymbalta to help with pain thresholds, discussed June 13, 2024     Impaired fasting glucose hemoglobin A1c at 6.3, up slightly December 2023      Gastroesophageal reflux disease continues omeprazole 40 mg daily     Hypertension continues lisinopril HCTZ 20-12.5 mg daily,     Pulmonary nodule, 3 mm left lower lobe----CAT scan December 23, 2021, --- CT scan December 14, 2022 showed coronary artery calcifications finding consistent with prior granulomatous disease no new or enlarging pulmonary nodules--- the previously identified 3 mm left lower lobe nodule was not identified December 2022     Vitamin B12 at 262 --dec 2022 --- continues over-the-counter B12 supplements daily, improved, June 2023,     Tobacco use,--smoked total of 37 years, quit about 5 years ago, gained some weight,     Does not want a mammogram at this time, discussed December 2023                     Follow Up   Return in about 5 months (around 11/13/2024).  Patient was given instructions and counseling regarding her condition or for health maintenance advice. Please see specific information pulled into the AVS if appropriate.

## 2024-06-17 ENCOUNTER — TELEPHONE (OUTPATIENT)
Dept: PHYSICAL THERAPY | Facility: OTHER | Age: 59
End: 2024-06-17
Payer: COMMERCIAL

## 2024-06-17 NOTE — TELEPHONE ENCOUNTER
Caller: Gualberto Gloria    Relationship: Self    What was the call regarding: PATIENT CANCELLED APPTS TODAY. NO REASON WAS GIVEN

## 2024-06-28 ENCOUNTER — TELEPHONE (OUTPATIENT)
Dept: INTERNAL MEDICINE | Age: 59
End: 2024-06-28

## 2024-06-28 NOTE — TELEPHONE ENCOUNTER
Caller: Gualberto Gloria    Relationship: Self    Best call back number: [phone]     Requested Prescriptions:   PAXIL 40MG     Pharmacy where request should be sent: [facility] [address] [place] [address] [address] [facility] [place] [phone] [facility] [phone] FX     Last office visit with prescribing clinician: 6/13/2024   Last telemedicine visit with prescribing clinician: 4/10/2024   Next office visit with prescribing clinician: 11/13/2024     Does the patient have less than a 3 day supply:  [] Yes  [x] No    Would you like a call back once the refill request has been completed: [x] Yes [] No    If the office needs to give you a call back, can they leave a voicemail: [x] Yes [] No    Yanna Sarah Rep   06/28/24 14:02 EDT

## 2024-06-28 NOTE — TELEPHONE ENCOUNTER
Left a vm for the patient to call me back. Need to know who put her on Paxil? It is not on her med list.

## 2024-07-02 ENCOUNTER — OFFICE VISIT (OUTPATIENT)
Dept: INTERNAL MEDICINE | Age: 59
End: 2024-07-02
Payer: COMMERCIAL

## 2024-07-02 VITALS
RESPIRATION RATE: 18 BRPM | HEIGHT: 65 IN | BODY MASS INDEX: 32.36 KG/M2 | HEART RATE: 63 BPM | DIASTOLIC BLOOD PRESSURE: 82 MMHG | TEMPERATURE: 98.6 F | WEIGHT: 194.2 LBS | OXYGEN SATURATION: 97 % | SYSTOLIC BLOOD PRESSURE: 120 MMHG

## 2024-07-02 DIAGNOSIS — R35.0 URINARY FREQUENCY: Primary | ICD-10-CM

## 2024-07-02 DIAGNOSIS — R82.90 MALODOROUS URINE: ICD-10-CM

## 2024-07-02 LAB
BILIRUB BLD-MCNC: NEGATIVE MG/DL
CLARITY, POC: CLEAR
COLOR UR: YELLOW
EXPIRATION DATE: NORMAL
GLUCOSE UR STRIP-MCNC: NEGATIVE MG/DL
KETONES UR QL: NEGATIVE
LEUKOCYTE EST, POC: NEGATIVE
Lab: NORMAL
NITRITE UR-MCNC: NEGATIVE MG/ML
PH UR: 6 [PH] (ref 5–8)
PROT UR STRIP-MCNC: NEGATIVE MG/DL
RBC # UR STRIP: NEGATIVE /UL
SP GR UR: 1.01 (ref 1–1.03)
UROBILINOGEN UR QL: NORMAL

## 2024-07-02 PROCEDURE — 87086 URINE CULTURE/COLONY COUNT: CPT | Performed by: NURSE PRACTITIONER

## 2024-07-02 PROCEDURE — 81003 URINALYSIS AUTO W/O SCOPE: CPT | Performed by: NURSE PRACTITIONER

## 2024-07-02 PROCEDURE — 99213 OFFICE O/P EST LOW 20 MIN: CPT | Performed by: NURSE PRACTITIONER

## 2024-07-02 NOTE — PROGRESS NOTES
"Chief Complaint  Urinary Frequency (58 year female is here for urine frequency and odor. Patient states this has been going on for over a week )    Subjective      Gualberto Gloria is a 58-year-old female that presents to [facility] with c/o urine odor and urinary frequency. States this has been going on for almost 2 weeks. She denies hematuria, fever, flank pain or dysuria.   States that she was recently switched from paxil to pristiq but has switched back.  States that the Pristiq made her angry and she was crying a lot.  Symptoms started around the same time of her medication change.  She states that she switched back about a week ago but symptoms have not resolved.`    History of Present Illness    Current Outpatient Medications   Medication Instructions    acetaminophen-codeine (TYLENOL with CODEINE #3) 300-30 MG per tablet 1 tablet, Oral, Every 6 Hours    aspirin 81 mg, Oral, Every Other Day    atorvastatin (LIPITOR) 20 mg, Oral, Daily    dexAMETHasone (DECADRON) 4 mg, Oral, 3 times daily    DULoxetine (CYMBALTA) 60 mg, Oral, Daily    ibuprofen (ADVIL,MOTRIN) 800 mg, Oral, Every 6 Hours PRN    levothyroxine (SYNTHROID, LEVOTHROID) 25 mcg, Oral, Daily    lisinopril-hydrochlorothiazide (PRINZIDE,ZESTORETIC) 20-12.5 MG per tablet 1 tablet, Oral, Daily    omeprazole (PRILOSEC) 40 mg, Oral, Daily    Vitamin D3 50 mcg, Oral, Every Other Day       The following portions of the patient's history were reviewed and updated as appropriate: allergies, current medications, past family history, past medical history, past social history, past surgical history, and problem list.    Objective   Vital Signs:   /82 (BP Location: Right arm, Patient Position: Sitting, Cuff Size: Large Adult)   Pulse 63   Temp 98.6 °F (37 °C)   Resp 18   Ht 165.2 cm (65.04\")   Wt 88.1 kg (194 lb 3.2 oz)   SpO2 97%   BMI 32.28 kg/m²     Wt Readings from Last 3 Encounters:   07/02/24 88.1 kg (194 lb " 3.2 oz)   06/13/24 87.5 kg (193 lb)   03/27/24 86.2 kg (190 lb)     BP Readings from Last 3 Encounters:   07/02/24 120/82   06/13/24 118/84   03/27/24 109/77     Physical Exam  Vitals and nursing note reviewed.   Constitutional:       Appearance: Normal appearance. She is not ill-appearing.   HENT:      Head: Normocephalic and atraumatic.   Pulmonary:      Effort: Pulmonary effort is normal.   Neurological:      Mental Status: She is alert and oriented to person, place, and time.   Psychiatric:         Mood and Affect: Mood normal.         Behavior: Behavior normal.          Result Review :  The following data was reviewed by: ANA LAURA Silvestre on 07/02/2024:      Common labs          2/6/2024    09:14 3/20/2024    14:44 3/27/2024    11:29   Common Labs   Glucose   92    BUN   16    Creatinine   1.02    Sodium   138    Potassium   4.0    Chloride   102    Calcium   9.3    Albumin   4.2    Total Bilirubin   0.3    Alkaline Phosphatase   91    AST (SGOT)   20    ALT (SGPT)   19    WBC  5.87     Hemoglobin  13.0     Hematocrit  37.9     Platelets  251     Total Cholesterol 186      Triglycerides 95      HDL Cholesterol 64      LDL Cholesterol  105      Uric Acid  5.4         Lab Results (last 72 hours)       Procedure Component Value Units Date/Time    Urine Culture - Urine, Urine, Clean Catch [[id number]] Collected: 07/02/24 1331    Specimen: Urine, Clean Catch Updated: 07/02/24 1331             CT Chest Without Contrast Diagnostic    Result Date: 4/16/2024  1. No suspicious pulmonary nodules. Stable tiny benign calcified granuloma within the inferior aspect of the left lower lobe. 2. Coronary artery atherosclerotic disease.      Electronically Signed By-Ilir sTai MD On:4/16/2024 3:28 PM      XR Hand 3+ View Right    Result Date: 3/20/2024  Impression: 1. Degenerative joint disease at the first carpometacarpal joint and second DIP joint. These appear mildly worse when compared to prior radiographs dated  9/13/2023.   Electronically Signed By-Ilir Tsai MD On:3/20/2024 5:40 PM        Lab Results   Component Value Date    SARSANTIGEN Not Detected 10/31/2023    COVID19 Detected (C) 01/20/2022    FLUAAG Not Detected 10/31/2023    FLUBAG Not Detected 10/31/2023       Procedures        Assessment and Plan   Diagnoses and all orders for this visit:    1. Urinary frequency (Primary)  -     POCT urinalysis dipstick, automated  -     Urine Culture - Urine, Urine, Clean Catch; Future  -     Urine Culture - Urine, Urine, Clean Catch    2. Malodorous urine  -     POCT urinalysis dipstick, automated                  There are no discontinued medications.       Follow Up   No follow-ups on file.  Patient was given instructions and counseling regarding her condition or for health maintenance advice. Please see specific information pulled into the AVS if appropriate.       Bobbi Sebastian, ANA LAURA  07/02/24  13:37 EDT

## 2024-07-02 NOTE — PATIENT INSTRUCTIONS
Urinalysis today does not reveal any signs of infection.  However, we will send off for culture and treat with an antibiotic if indicated.  Be sure you are increasing your water intake and limit caffeine as this can irritate the bladder.  Keep the vaginal area clean and dry, wipe front to back after voiding.

## 2024-07-04 LAB — BACTERIA SPEC AEROBE CULT: NO GROWTH

## 2024-07-23 DIAGNOSIS — F32.89 OTHER DEPRESSION: ICD-10-CM

## 2024-07-24 RX ORDER — PAROXETINE HYDROCHLORIDE 40 MG/1
40 TABLET, FILM COATED ORAL EVERY MORNING
Qty: 90 TABLET | Refills: 3 | Status: SHIPPED | OUTPATIENT
Start: 2024-07-24

## 2024-07-24 RX ORDER — DEXAMETHASONE 4 MG/1
4 TABLET ORAL 2 TIMES DAILY WITH MEALS
Qty: 60 TABLET | Refills: 5 | Status: SHIPPED | OUTPATIENT
Start: 2024-07-24

## 2024-07-30 ENCOUNTER — DOCUMENTATION (OUTPATIENT)
Dept: PHYSICAL THERAPY | Facility: CLINIC | Age: 59
End: 2024-07-30
Payer: COMMERCIAL

## 2024-07-30 NOTE — PROGRESS NOTES
Discharge Summary  Discharge Summary from Occupational Therapy Report    Patient Information  Gualberto Gloria  1965    Dates OT visit: 4/24/24-6/12/24  Number of Visits: 6     Discharge Status of Patient:   Pain 4/10 and at worst 10/10  Observations      Right Wrist/Hand   Positive for Heberden's nodes.      Additional Wrist/Hand Observation Details  Pt wearing prefab hand thumb spica     Tenderness      Additional Tenderness Details  Tenderness in R thumb     Neurological Testing      Sensation      Wrist/Hand      Right   Intact: light touch     Additional Neurological Details  Occasional tingling in finger tips     Active Range of Motion      Right Wrist   Wrist flexion: 60 degrees   Wrist extension: 60 degrees      Right Thumb   Flexion     MP: 40 degrees    DIP: 45 degrees  Opposition: To small finger tip     Additional Active Range of Motion Details  Full loose fist and full digital extension      Strength/Myotome Testing      Left Wrist/Hand       (2nd hand position)     Trial 1: 52 lbs    Trial 2: 56 lbs    Trial 3: 56 lbs    Average: 54.67 lbs     Right Wrist/Hand       (2nd hand position)     Trial 1: 22 lbs    Trial 2: 25 lbs    Trial 3: 25 lbs    Average: 24 lbs      Goals: Partially Met    Visit Diagnoses:  No diagnosis found.    Discharge Plan: Continue with current home exercise program as instructed    Comments Pt did cancelled and no showed her last several week of appointments and was unable to be reached by phone.    Date of Discharge 7/30/24        PETER Betancur/L  Occupational Therapist  License number [id number]

## 2024-08-26 ENCOUNTER — TELEPHONE (OUTPATIENT)
Dept: INTERNAL MEDICINE | Age: 59
End: 2024-08-26

## 2024-08-27 ENCOUNTER — OFFICE VISIT (OUTPATIENT)
Dept: INTERNAL MEDICINE | Age: 59
End: 2024-08-27
Payer: COMMERCIAL

## 2024-08-27 VITALS
BODY MASS INDEX: 32.15 KG/M2 | SYSTOLIC BLOOD PRESSURE: 102 MMHG | WEIGHT: 193 LBS | HEIGHT: 65 IN | TEMPERATURE: 98 F | HEART RATE: 81 BPM | DIASTOLIC BLOOD PRESSURE: 72 MMHG | OXYGEN SATURATION: 96 %

## 2024-08-27 DIAGNOSIS — R05.9 COUGH, UNSPECIFIED TYPE: ICD-10-CM

## 2024-08-27 DIAGNOSIS — J02.9 SORE THROAT: ICD-10-CM

## 2024-08-27 DIAGNOSIS — J01.00 ACUTE NON-RECURRENT MAXILLARY SINUSITIS: ICD-10-CM

## 2024-08-27 DIAGNOSIS — N76.0 ACUTE VAGINITIS: ICD-10-CM

## 2024-08-27 LAB
EXPIRATION DATE: NORMAL
EXPIRATION DATE: NORMAL
FLUAV AG UPPER RESP QL IA.RAPID: NOT DETECTED
FLUBV AG UPPER RESP QL IA.RAPID: NOT DETECTED
INTERNAL CONTROL: NORMAL
INTERNAL CONTROL: NORMAL
Lab: NORMAL
Lab: NORMAL
S PYO AG THROAT QL: NEGATIVE
SARS-COV-2 AG UPPER RESP QL IA.RAPID: NOT DETECTED

## 2024-08-27 PROCEDURE — 99213 OFFICE O/P EST LOW 20 MIN: CPT | Performed by: INTERNAL MEDICINE

## 2024-08-27 PROCEDURE — 87880 STREP A ASSAY W/OPTIC: CPT | Performed by: INTERNAL MEDICINE

## 2024-08-27 PROCEDURE — 87428 SARSCOV & INF VIR A&B AG IA: CPT | Performed by: INTERNAL MEDICINE

## 2024-08-27 PROCEDURE — 96372 THER/PROPH/DIAG INJ SC/IM: CPT | Performed by: INTERNAL MEDICINE

## 2024-08-27 RX ORDER — ALBUTEROL SULFATE 90 UG/1
AEROSOL, METERED RESPIRATORY (INHALATION)
Qty: 18 G | Refills: 1 | Status: SHIPPED | OUTPATIENT
Start: 2024-08-27

## 2024-08-27 RX ORDER — CEFIXIME 400 MG/1
400 CAPSULE ORAL DAILY
Qty: 7 CAPSULE | Refills: 0 | Status: SHIPPED | OUTPATIENT
Start: 2024-08-27

## 2024-08-27 RX ORDER — METHYLPREDNISOLONE ACETATE 40 MG/ML
80 INJECTION, SUSPENSION INTRA-ARTICULAR; INTRALESIONAL; INTRAMUSCULAR; SOFT TISSUE ONCE
Status: COMPLETED | OUTPATIENT
Start: 2024-08-27 | End: 2024-08-27

## 2024-08-27 RX ORDER — CEFTRIAXONE 1 G/1
1 INJECTION, POWDER, FOR SOLUTION INTRAMUSCULAR; INTRAVENOUS EVERY 24 HOURS
Status: COMPLETED | OUTPATIENT
Start: 2024-08-27 | End: 2024-08-27

## 2024-08-27 RX ORDER — FLUCONAZOLE 150 MG/1
TABLET ORAL
Qty: 2 TABLET | Refills: 0 | Status: SHIPPED | OUTPATIENT
Start: 2024-08-27

## 2024-08-27 RX ADMIN — METHYLPREDNISOLONE ACETATE 200 MG: 40 INJECTION, SUSPENSION INTRA-ARTICULAR; INTRALESIONAL; INTRAMUSCULAR; SOFT TISSUE at 15:36

## 2024-08-27 RX ADMIN — CEFTRIAXONE 1 G: 1 INJECTION, POWDER, FOR SOLUTION INTRAMUSCULAR; INTRAVENOUS at 15:34

## 2024-08-27 NOTE — PROGRESS NOTES
CHIEF COMPLAINT  Gualberto Gloria presents to [facility] for follow-up of Sore Throat, Cough, Headache, Fatigue, Sinusitis (Drainage ), Shortness of Breath, Chills, and Ear Fullness.    HPI    59-year-old patient with underlying mixed hyperlipidemia, hypertension, prediabetes patient of Dr. Kel Gutierrez with complaint of 2 days of sore throat cough headache fatigue and sinus drainage along with shortness of air and chills-see above-feels may have sinus infection-no smoking for 7 yrs-none now    At clinic patient was negative for strep COVID flu A/B  Patient is requesting a shot form of an antibiotic      Current Outpatient Medications:     aspirin 81 MG EC tablet, Take 1 tablet by mouth Every Other Day., Disp: 90 tablet, Rfl: 2    atorvastatin (LIPITOR) 20 MG tablet, Take 1 tablet by mouth Daily., Disp: 90 tablet, Rfl: 3    Cholecalciferol (Vitamin D3) 50 MCG (2000 UT) tablet, Take 1 tablet by mouth Every Other Day., Disp: 30 tablet, Rfl: 2    dexAMETHasone (DECADRON) 4 MG tablet, TAKE 1 TABLET BY MOUTH TWICE DAILY WITH MEALS, Disp: 60 tablet, Rfl: 5    DULoxetine (Cymbalta) 60 MG capsule, Take 1 capsule by mouth Daily., Disp: 30 capsule, Rfl: 5    ibuprofen (ADVIL,MOTRIN) 800 MG tablet, Take 1 tablet by mouth Every 6 (Six) Hours As Needed for Mild Pain., Disp: , Rfl:     levothyroxine (SYNTHROID, LEVOTHROID) 25 MCG tablet, Take 1 tablet by mouth Daily., Disp: 90 tablet, Rfl: 3    lisinopril-hydrochlorothiazide (PRINZIDE,ZESTORETIC) 20-12.5 MG per tablet, Take 1 tablet by mouth Daily., Disp: 90 tablet, Rfl: 3    omeprazole (priLOSEC) 40 MG capsule, Take 1 capsule by mouth Daily., Disp: 90 capsule, Rfl: 3    PARoxetine (PAXIL) 40 MG tablet, TAKE 1 TABLET BY MOUTH ONCE DAILY IN THE MORNING, Disp: 90 tablet, Rfl: 3    acetaminophen-codeine (TYLENOL with CODEINE #3) 300-30 MG per tablet, Take 1 tablet by mouth Every 6 (Six) Hours. (Patient not taking: Reported on 8/27/2024), Disp: ,  "Rfl:     albuterol sulfate  (90 Base) MCG/ACT inhaler, 2 puffs  every 4-6 hours as needed for shortness of air, Disp: 18 g, Rfl: 1    Cefixime (SUPRAX) 400 MG capsule, Take 1 capsule by mouth Daily. X 7 days, Disp: 7 capsule, Rfl: 0    fluconazole (Diflucan) 150 MG tablet, If with yeast infection take one tablet and  -may repeat again one week later if still with discharge, Disp: 2 tablet, Rfl: 0   PFSH reviewed.      OBJECTIVE  Vital Signs  Vitals:    08/27/24 1426   BP: 102/72   BP Location: Left arm   Patient Position: Sitting   Cuff Size: Small Adult   Pulse: 81   Temp: 98 °F (36.7 °C)   TempSrc: Temporal   SpO2: 96%   Weight: 87.5 kg (193 lb)   Height: 165.2 cm (65.04\")      Body mass index is 32.08 kg/m².    Physical Exam  Vitals and nursing note reviewed.   Constitutional:       Appearance: Normal appearance.   HENT:      Head: Normocephalic and atraumatic.      Right Ear: Tympanic membrane normal.      Left Ear: Tympanic membrane normal.      Nose: Rhinorrhea present.      Mouth/Throat:      Pharynx: No posterior oropharyngeal erythema.   Cardiovascular:      Rate and Rhythm: Normal rate and regular rhythm.   Pulmonary:      Effort: Pulmonary effort is normal.      Breath sounds: Normal breath sounds.   Abdominal:      Palpations: Abdomen is soft.   Musculoskeletal:      Cervical back: Normal range of motion and neck supple.   Neurological:      General: No focal deficit present.      Mental Status: She is alert and oriented to person, place, and time.          RESULTS REVIEW  No results found for: \"PROBNP\", \"BNP\"  CMP          12/11/2023    09:05 3/27/2024    11:29   CMP   Glucose 95  92    BUN 16  16    Creatinine 1.00  1.02    EGFR 65.0  63.5    Sodium 135  138    Potassium 4.3  4.0    Chloride 97  102    Calcium 9.9  9.3    Total Protein 7.2  6.4    Albumin 4.6  4.2    Globulin 2.6  2.2    Total Bilirubin 0.4  0.3    Alkaline Phosphatase 125  91    AST (SGOT) 21  20    ALT (SGPT) 14  19  "   Albumin/Globulin Ratio 1.8  1.9    BUN/Creatinine Ratio 16.0  15.7    Anion Gap 11.0  10.0      CBC w/diff          12/11/2023    09:05 3/20/2024    14:44   CBC w/Diff   WBC 7.60  5.87    RBC 4.60  4.08    Hemoglobin 14.3  13.0    Hematocrit 42.6  37.9    MCV 92.6  92.9    MCH 31.1  31.9    MCHC 33.6  34.3    RDW 13.0  13.0    Platelets 301  251    Neutrophil Rel % 57.5  57.5    Immature Granulocyte Rel % 0.5  0.2    Lymphocyte Rel % 30.8  30.5    Monocyte Rel % 8.8  8.9    Eosinophil Rel % 1.7  2.2    Basophil Rel % 0.7  0.7       Lipid Panel          2/6/2024    09:14   Lipid Panel   Total Cholesterol 186    Triglycerides 95    HDL Cholesterol 64    VLDL Cholesterol 17    LDL Cholesterol  105    LDL/HDL Ratio 1.61       Lab Results   Component Value Date    TSH 2.860 12/11/2023    TSH 3.120 11/16/2022    TSH 2.330 12/02/2021      Lab Results   Component Value Date    FREET4 1.36 12/11/2023    FREET4 1.29 11/16/2022    FREET4 1.34 12/02/2021      A1C Last 3 Results          12/11/2023    09:05   HGBA1C Last 3 Results   Hemoglobin A1C 6.30       Lab Results   Component Value Date    GMYCSXVL85 657 06/01/2023    OKDS13EY 45.3 11/16/2022        No Images in the past 120 days found..             ASSESSMENT & PLAN  Diagnoses and all orders for this visit:    1. Acute non-recurrent maxillary sinusitis (Primary)  Assessment & Plan:  Give Depo-Medrol 80 mg IM and Rocephin 1 g IM now-patient requesting shots for her antibiotics today  Then take cefixime 400 mg daily for 7 days  Use albuterol inhaler as needed 2 puffs every 4 to 6 hours as needed shortness of ear  If symptoms worsen, increased wheezing shortness of breath needs to go to the ER  Mucinex 1 tablet twice a day as needed congestion    Orders:  -     Cefixime (SUPRAX) 400 MG capsule; Take 1 capsule by mouth Daily. X 7 days  Dispense: 7 capsule; Refill: 0  -     albuterol sulfate  (90 Base) MCG/ACT inhaler; 2 puffs  every 4-6 hours as needed for shortness  of air  Dispense: 18 g; Refill: 1    2. Sore throat  -     POCT rapid strep A  -     albuterol sulfate  (90 Base) MCG/ACT inhaler; 2 puffs  every 4-6 hours as needed for shortness of air  Dispense: 18 g; Refill: 1    3. Cough, unspecified type  -     POCT SARS-CoV-2 Antigen JAVED + Flu  -     albuterol sulfate  (90 Base) MCG/ACT inhaler; 2 puffs  every 4-6 hours as needed for shortness of air  Dispense: 18 g; Refill: 1    4. Acute vaginitis  -     fluconazole (Diflucan) 150 MG tablet; If with yeast infection take one tablet and  -may repeat again one week later if still with discharge  Dispense: 2 tablet; Refill: 0    PL AN  Patient Instructions   Take cefixime 40 mg once a day day for 7 days  Use albuterol inhaler every 4 -6 hrs or as needed for shortness of air  Try Mucinex 1 tablet twice a day as needed congestion    Received shot of Depo-Medrol 80 mg IM and Rocephin 1 g IM at the clinic    Follow-up PCP if symptoms do not improve in the next several days      Diflucan 150 mg 1 tablet with yeast infection can repeat again 1 week later if still with discharge      FOLLOW UP  Return if symptoms worsen or fail to improve, for Recheck.    Patient was given instructions and counseling regarding her condition or for health maintenance advice. Please see specific information pulled into the AVS if appropriate.

## 2024-08-27 NOTE — ASSESSMENT & PLAN NOTE
Give Depo-Medrol 80 mg IM and Rocephin 1 g IM now-patient requesting shots for her antibiotics today  Then take cefixime 400 mg daily for 7 days  Use albuterol inhaler as needed 2 puffs every 4 to 6 hours as needed shortness of ear  If symptoms worsen, increased wheezing shortness of breath needs to go to the ER  Mucinex 1 tablet twice a day as needed congestion

## 2024-08-27 NOTE — PATIENT INSTRUCTIONS
Take cefixime 40 mg once a day day for 7 days  Use albuterol inhaler every 4 -6 hrs or as needed for shortness of air  Try Mucinex 1 tablet twice a day as needed congestion    Received shot of Depo-Medrol 80 mg IM and Rocephin 1 g IM at the clinic    Follow-up PCP if symptoms do not improve in the next several days

## 2024-10-11 ENCOUNTER — NURSE TRIAGE (OUTPATIENT)
Dept: CALL CENTER | Facility: HOSPITAL | Age: 59
End: 2024-10-11
Payer: COMMERCIAL

## 2024-10-11 NOTE — TELEPHONE ENCOUNTER
"HUB call Unable to reach office. Patient refused ED.  Feels like she will pass out.      Patient has been vomiting all night. Multiple times. Vomitus is now yellow in color and also has dry heaves. Having diarrhea.  Weak and feels like she will pass out.   Has not been able to take fluids.  Care advice per guideline. Recommended ED    Reason for Disposition   [1] SEVERE vomiting (e.g., 6 or more times/day) AND [2] present > 8 hours (Exception: Patient sounds well, is drinking liquids, does not sound dehydrated, and vomiting has lasted less than 24 hours.)    Additional Information   Negative: Shock suspected (e.g., cold/pale/clammy skin, too weak to stand, low BP, rapid pulse)   Negative: Difficult to awaken or acting confused (e.g., disoriented, slurred speech)   Negative: Sounds like a life-threatening emergency to the triager   Negative: Vomiting occurs only while coughing   Negative: [1] Pregnant < 20 Weeks AND [2] nausea/vomiting began in early pregnancy (i.e., 4-8 weeks pregnant)   Negative: Chest pain   Negative: Headache is main symptom   Negative: Vomiting (or Nausea) in a cancer patient who is currently (or recently) receiving chemotherapy or radiation therapy, or cancer patient who has metastatic or end-stage cancer and is receiving palliative care   Negative: [1] Vomiting AND [2] contains red blood or black (\"coffee ground\") material  (Exception: Few red streaks in vomit that only happened once.)   Negative: Severe pain in one eye   Negative: Recent head injury (within last 3 days)   Negative: Recent abdominal injury (within last 3 days)   Negative: [1] Insulin-dependent diabetes (Type I) AND [2] glucose > 400 mg/dl (22 mmol/l)   Negative: [1] Vomiting AND [2] hernia is more painful or swollen than usual    Answer Assessment - Initial Assessment Questions  1. VOMITING SEVERITY: \"How many times have you vomited in the past 24 hours?\"      - MILD:  1 - 2 times/day     - MODERATE: 3 - 5 times/day, decreased " "oral intake without significant weight loss or symptoms of dehydration     - SEVERE: 6 or more times/day, vomits everything or nearly everything, with significant weight loss, symptoms of dehydration      Multiple times  2. ONSET: \"When did the vomiting begin?\"       All night 10 pm  3. FLUIDS: \"What fluids or food have you vomited up today?\" \"Have you been able to keep any fluids down?\"   No fluids since before vomiting  4. ABDOMEN PAIN: \"Are your having any abdomen pain?\" If Yes : \"How bad is it and what does it feel like?\" (e.g., crampy, dull, intermittent, constant)       cramping  5. DIARRHEA: \"Is there any diarrhea?\" If Yes, ask: \"How many times today?\"       Yes multiple times  6. CONTACTS: \"Is there anyone else in the family with the same symptoms?\"    Does not know  7. CAUSE: \"What do you think is causing your vomiting?\"      Does not know  8. HYDRATION STATUS: \"Any signs of dehydration?\" (e.g., dry mouth [not only dry lips], too weak to stand) \"When did you last urinate?\"      Urinating a little bit  9. OTHER SYMPTOMS: \"Do you have any other symptoms?\" (e.g., fever, headache, vertigo, vomiting blood or coffee grounds, recent head injury)      Feels like she will pass out, very weak.  Has vomited no blood. Vomitus is now yellow in color and she also has dry heaves.  10. PREGNANCY: \"Is there any chance you are pregnant?\" \"When was your last menstrual period?\"       na    Protocols used: Vomiting-ADULT-AH    "

## 2024-11-07 ENCOUNTER — LAB (OUTPATIENT)
Dept: INTERNAL MEDICINE | Age: 59
End: 2024-11-07
Payer: COMMERCIAL

## 2024-11-07 DIAGNOSIS — E06.3 HYPOTHYROIDISM DUE TO HASHIMOTO'S THYROIDITIS: ICD-10-CM

## 2024-11-07 DIAGNOSIS — R73.01 IFG (IMPAIRED FASTING GLUCOSE): ICD-10-CM

## 2024-11-07 DIAGNOSIS — E03.8 OTHER SPECIFIED HYPOTHYROIDISM: ICD-10-CM

## 2024-11-07 DIAGNOSIS — E78.2 MIXED HYPERLIPIDEMIA: ICD-10-CM

## 2024-11-07 DIAGNOSIS — E55.9 VITAMIN D DEFICIENCY: ICD-10-CM

## 2024-11-07 DIAGNOSIS — E53.8 VITAMIN B12 DEFICIENCY: ICD-10-CM

## 2024-11-07 DIAGNOSIS — M79.641 HAND PAIN, RIGHT: ICD-10-CM

## 2024-11-07 DIAGNOSIS — R91.8 INDETERMINATE PULMONARY NODULES: ICD-10-CM

## 2024-11-07 DIAGNOSIS — I10 PRIMARY HYPERTENSION: ICD-10-CM

## 2024-11-07 DIAGNOSIS — I10 ESSENTIAL HYPERTENSION: ICD-10-CM

## 2024-11-07 DIAGNOSIS — K22.70 BARRETT'S ESOPHAGUS WITHOUT DYSPLASIA: ICD-10-CM

## 2024-11-07 DIAGNOSIS — Z98.890 H/O HAND SURGERY: ICD-10-CM

## 2024-11-07 DIAGNOSIS — N18.31 STAGE 3A CHRONIC KIDNEY DISEASE: ICD-10-CM

## 2024-11-07 DIAGNOSIS — F32.89 OTHER DEPRESSION: ICD-10-CM

## 2024-11-07 DIAGNOSIS — M19.042 PRIMARY OSTEOARTHRITIS OF BOTH HANDS: ICD-10-CM

## 2024-11-07 DIAGNOSIS — M19.041 PRIMARY OSTEOARTHRITIS OF BOTH HANDS: ICD-10-CM

## 2024-11-07 LAB
25(OH)D3 SERPL-MCNC: 37.3 NG/ML (ref 30–100)
ALBUMIN SERPL-MCNC: 3.8 G/DL (ref 3.5–5.2)
ALBUMIN/GLOB SERPL: 1.4 G/DL
ALP SERPL-CCNC: 111 U/L (ref 39–117)
ALT SERPL W P-5'-P-CCNC: 21 U/L (ref 1–33)
ANION GAP SERPL CALCULATED.3IONS-SCNC: 7.2 MMOL/L (ref 5–15)
ASO AB SERPL-ACNC: NEGATIVE [IU]/ML
AST SERPL-CCNC: 26 U/L (ref 1–32)
BASOPHILS # BLD AUTO: 0.03 10*3/MM3 (ref 0–0.2)
BASOPHILS NFR BLD AUTO: 0.7 % (ref 0–1.5)
BILIRUB SERPL-MCNC: 0.3 MG/DL (ref 0–1.2)
BUN SERPL-MCNC: 18 MG/DL (ref 8–23)
BUN/CREAT SERPL: 20.2 (ref 7–25)
CALCIUM SPEC-SCNC: 9.5 MG/DL (ref 8.6–10.5)
CHLORIDE SERPL-SCNC: 104 MMOL/L (ref 98–107)
CHOLEST SERPL-MCNC: 175 MG/DL (ref 0–200)
CHROMATIN AB SERPL-ACNC: <10 IU/ML (ref 0–14)
CO2 SERPL-SCNC: 25.8 MMOL/L (ref 22–29)
CREAT SERPL-MCNC: 0.89 MG/DL (ref 0.57–1)
CRP SERPL-MCNC: <0.3 MG/DL (ref 0–0.5)
DEPRECATED RDW RBC AUTO: 39.8 FL (ref 37–54)
EGFRCR SERPLBLD CKD-EPI 2021: 74.3 ML/MIN/1.73
EOSINOPHIL # BLD AUTO: 0.14 10*3/MM3 (ref 0–0.4)
EOSINOPHIL NFR BLD AUTO: 3.3 % (ref 0.3–6.2)
ERYTHROCYTE [DISTWIDTH] IN BLOOD BY AUTOMATED COUNT: 12 % (ref 12.3–15.4)
ERYTHROCYTE [SEDIMENTATION RATE] IN BLOOD: 7 MM/HR (ref 0–30)
FOLATE SERPL-MCNC: 9.89 NG/ML (ref 4.78–24.2)
GLOBULIN UR ELPH-MCNC: 2.8 GM/DL
GLUCOSE SERPL-MCNC: 95 MG/DL (ref 65–99)
HBA1C MFR BLD: 6.2 % (ref 4.8–5.6)
HCT VFR BLD AUTO: 37.3 % (ref 34–46.6)
HDLC SERPL-MCNC: 57 MG/DL (ref 40–60)
HGB BLD-MCNC: 12.7 G/DL (ref 12–15.9)
IMM GRANULOCYTES # BLD AUTO: 0.01 10*3/MM3 (ref 0–0.05)
IMM GRANULOCYTES NFR BLD AUTO: 0.2 % (ref 0–0.5)
LDLC SERPL CALC-MCNC: 101 MG/DL (ref 0–100)
LDLC/HDLC SERPL: 1.75 {RATIO}
LYMPHOCYTES # BLD AUTO: 1.53 10*3/MM3 (ref 0.7–3.1)
LYMPHOCYTES NFR BLD AUTO: 36.3 % (ref 19.6–45.3)
MCH RBC QN AUTO: 31.3 PG (ref 26.6–33)
MCHC RBC AUTO-ENTMCNC: 34 G/DL (ref 31.5–35.7)
MCV RBC AUTO: 91.9 FL (ref 79–97)
MONOCYTES # BLD AUTO: 0.45 10*3/MM3 (ref 0.1–0.9)
MONOCYTES NFR BLD AUTO: 10.7 % (ref 5–12)
NEUTROPHILS NFR BLD AUTO: 2.05 10*3/MM3 (ref 1.7–7)
NEUTROPHILS NFR BLD AUTO: 48.8 % (ref 42.7–76)
NRBC BLD AUTO-RTO: 0 /100 WBC (ref 0–0.2)
PLATELET # BLD AUTO: 234 10*3/MM3 (ref 140–450)
PMV BLD AUTO: 9.5 FL (ref 6–12)
POTASSIUM SERPL-SCNC: 4.4 MMOL/L (ref 3.5–5.2)
PROT SERPL-MCNC: 6.6 G/DL (ref 6–8.5)
RBC # BLD AUTO: 4.06 10*6/MM3 (ref 3.77–5.28)
SODIUM SERPL-SCNC: 137 MMOL/L (ref 136–145)
T4 FREE SERPL-MCNC: 1.25 NG/DL (ref 0.92–1.68)
TRIGL SERPL-MCNC: 90 MG/DL (ref 0–150)
TSH SERPL DL<=0.05 MIU/L-ACNC: 3.77 UIU/ML (ref 0.27–4.2)
URATE SERPL-MCNC: 4.8 MG/DL (ref 2.4–5.7)
VIT B12 BLD-MCNC: 898 PG/ML (ref 211–946)
VLDLC SERPL-MCNC: 17 MG/DL (ref 5–40)
WBC NRBC COR # BLD AUTO: 4.21 10*3/MM3 (ref 3.4–10.8)

## 2024-11-07 PROCEDURE — 86431 RHEUMATOID FACTOR QUANT: CPT | Performed by: INTERNAL MEDICINE

## 2024-11-07 PROCEDURE — 84550 ASSAY OF BLOOD/URIC ACID: CPT | Performed by: INTERNAL MEDICINE

## 2024-11-07 PROCEDURE — 80050 GENERAL HEALTH PANEL: CPT | Performed by: INTERNAL MEDICINE

## 2024-11-07 PROCEDURE — 80061 LIPID PANEL: CPT | Performed by: INTERNAL MEDICINE

## 2024-11-07 PROCEDURE — 82746 ASSAY OF FOLIC ACID SERUM: CPT | Performed by: INTERNAL MEDICINE

## 2024-11-07 PROCEDURE — 36415 COLL VENOUS BLD VENIPUNCTURE: CPT | Performed by: INTERNAL MEDICINE

## 2024-11-07 PROCEDURE — 86063 ANTISTREPTOLYSIN O SCREEN: CPT | Performed by: INTERNAL MEDICINE

## 2024-11-07 PROCEDURE — 86038 ANTINUCLEAR ANTIBODIES: CPT | Performed by: INTERNAL MEDICINE

## 2024-11-07 PROCEDURE — 86140 C-REACTIVE PROTEIN: CPT | Performed by: INTERNAL MEDICINE

## 2024-11-07 PROCEDURE — 85652 RBC SED RATE AUTOMATED: CPT | Performed by: INTERNAL MEDICINE

## 2024-11-07 PROCEDURE — 86200 CCP ANTIBODY: CPT | Performed by: INTERNAL MEDICINE

## 2024-11-07 PROCEDURE — 83036 HEMOGLOBIN GLYCOSYLATED A1C: CPT | Performed by: INTERNAL MEDICINE

## 2024-11-07 PROCEDURE — 82607 VITAMIN B-12: CPT | Performed by: INTERNAL MEDICINE

## 2024-11-07 PROCEDURE — 82306 VITAMIN D 25 HYDROXY: CPT | Performed by: INTERNAL MEDICINE

## 2024-11-07 PROCEDURE — 84439 ASSAY OF FREE THYROXINE: CPT | Performed by: INTERNAL MEDICINE

## 2024-11-08 LAB
ANA SER QL: NEGATIVE
CCP IGA+IGG SERPL IA-ACNC: 6 UNITS (ref 0–19)
RHEUMATOID FACT SERPL-ACNC: 12 IU/ML

## 2024-11-13 ENCOUNTER — OFFICE VISIT (OUTPATIENT)
Dept: INTERNAL MEDICINE | Age: 59
End: 2024-11-13
Payer: COMMERCIAL

## 2024-11-13 VITALS
HEIGHT: 65 IN | WEIGHT: 198 LBS | DIASTOLIC BLOOD PRESSURE: 76 MMHG | SYSTOLIC BLOOD PRESSURE: 114 MMHG | HEART RATE: 94 BPM | OXYGEN SATURATION: 93 % | BODY MASS INDEX: 32.99 KG/M2 | TEMPERATURE: 98.2 F

## 2024-11-13 DIAGNOSIS — M75.51 BILATERAL SHOULDER BURSITIS: ICD-10-CM

## 2024-11-13 DIAGNOSIS — M19.041 PRIMARY OSTEOARTHRITIS OF BOTH HANDS: ICD-10-CM

## 2024-11-13 DIAGNOSIS — E78.2 MIXED HYPERLIPIDEMIA: ICD-10-CM

## 2024-11-13 DIAGNOSIS — M18.0 ARTHRITIS OF CARPOMETACARPAL (CMC) JOINT OF BOTH THUMBS: ICD-10-CM

## 2024-11-13 DIAGNOSIS — E06.3 HYPOTHYROIDISM DUE TO HASHIMOTO'S THYROIDITIS: ICD-10-CM

## 2024-11-13 DIAGNOSIS — R73.01 IFG (IMPAIRED FASTING GLUCOSE): ICD-10-CM

## 2024-11-13 DIAGNOSIS — M19.042 PRIMARY OSTEOARTHRITIS OF BOTH HANDS: ICD-10-CM

## 2024-11-13 DIAGNOSIS — E53.8 VITAMIN B12 DEFICIENCY: ICD-10-CM

## 2024-11-13 DIAGNOSIS — F32.89 OTHER DEPRESSION: ICD-10-CM

## 2024-11-13 DIAGNOSIS — M75.52 BILATERAL SHOULDER BURSITIS: ICD-10-CM

## 2024-11-13 DIAGNOSIS — I10 PRIMARY HYPERTENSION: Primary | ICD-10-CM

## 2024-11-13 PROCEDURE — 99214 OFFICE O/P EST MOD 30 MIN: CPT | Performed by: INTERNAL MEDICINE

## 2024-11-13 RX ORDER — CEFDINIR 300 MG/1
300 CAPSULE ORAL 2 TIMES DAILY
Qty: 20 CAPSULE | Refills: 0 | Status: SHIPPED | OUTPATIENT
Start: 2024-11-13

## 2024-11-13 NOTE — PROGRESS NOTES
"CHIEF COMPLAINT/ HPI: Patient is here to follow-up with impaired fasting glucose, lab work, arthritis previous hand surgery, blood pressure concerns, and a history of a pulmonary nodule that seems to have resolved, she is otherwise doing well, she is also here to follow-up with cholesterol issues thyroid issues and blood pressure concerns,  Hyperlipidemia (Routine follow up. Lab follow up. Bilateral shoulder pain radiating down the arms, gets worse at night. Rt otalgia. Pt states that she has not take the Dexamethason due to it making her face hot and red. )              Objective   Vital Signs  Vitals:    11/13/24 1025   BP: 114/76   BP Location: Right arm   Patient Position: Sitting   Pulse: 94   Temp: 98.2 °F (36.8 °C)   SpO2: 93%   Weight: 89.8 kg (198 lb)   Height: 165.2 cm (65.04\")      Body mass index is 32.91 kg/m².  Review of Systems   Constitutional: Negative.    HENT: Negative.     Eyes: Negative.    Respiratory: Negative.     Cardiovascular: Negative.    Gastrointestinal: Negative.    Endocrine: Negative.    Genitourinary: Negative.    Musculoskeletal: Negative.    Allergic/Immunologic: Negative.    Neurological: Negative.    Hematological: Negative.    Psychiatric/Behavioral: Negative.        Physical Exam  Constitutional:       General: She is not in acute distress.     Appearance: Normal appearance.   HENT:      Head: Normocephalic.      Mouth/Throat:      Mouth: Mucous membranes are moist.   Eyes:      Conjunctiva/sclera: Conjunctivae normal.      Pupils: Pupils are equal, round, and reactive to light.   Cardiovascular:      Rate and Rhythm: Normal rate and regular rhythm.      Pulses: Normal pulses.      Heart sounds: Normal heart sounds.   Pulmonary:      Effort: Pulmonary effort is normal.      Breath sounds: Normal breath sounds.   Abdominal:      General: Abdomen is flat. Bowel sounds are normal.      Palpations: Abdomen is soft.   Musculoskeletal:         General: No swelling. Normal range of " "motion.      Cervical back: Neck supple.   Skin:     General: Skin is warm and dry.      Coloration: Skin is not jaundiced.   Neurological:      General: No focal deficit present.      Mental Status: She is alert and oriented to person, place, and time. Mental status is at baseline.   Psychiatric:         Mood and Affect: Mood normal.         Behavior: Behavior normal.         Thought Content: Thought content normal.         Judgment: Judgment normal.     Patient has bilateral shoulder tenderness especially with abduction of the shoulders palpation of the tops of the shoulders deltoid regions bilateral  Result Review :   No results found for: \"PROBNP\", \"BNP\"  CMP          12/11/2023    09:05 3/27/2024    11:29 11/7/2024    09:10   CMP   Glucose 95  92  95    BUN 16  16  18    Creatinine 1.00  1.02  0.89    EGFR 65.0  63.5  74.3    Sodium 135  138  137    Potassium 4.3  4.0  4.4    Chloride 97  102  104    Calcium 9.9  9.3  9.5    Total Protein 7.2  6.4  6.6    Albumin 4.6  4.2  3.8    Globulin 2.6  2.2  2.8    Total Bilirubin 0.4  0.3  0.3    Alkaline Phosphatase 125  91  111    AST (SGOT) 21  20  26    ALT (SGPT) 14  19  21    Albumin/Globulin Ratio 1.8  1.9  1.4    BUN/Creatinine Ratio 16.0  15.7  20.2    Anion Gap 11.0  10.0  7.2      CBC w/diff          12/11/2023    09:05 3/20/2024    14:44 11/7/2024    09:10   CBC w/Diff   WBC 7.60  5.87  4.21    RBC 4.60  4.08  4.06    Hemoglobin 14.3  13.0  12.7    Hematocrit 42.6  37.9  37.3    MCV 92.6  92.9  91.9    MCH 31.1  31.9  31.3    MCHC 33.6  34.3  34.0    RDW 13.0  13.0  12.0    Platelets 301  251  234    Neutrophil Rel % 57.5  57.5  48.8    Immature Granulocyte Rel % 0.5  0.2  0.2    Lymphocyte Rel % 30.8  30.5  36.3    Monocyte Rel % 8.8  8.9  10.7    Eosinophil Rel % 1.7  2.2  3.3    Basophil Rel % 0.7  0.7  0.7       Lipid Panel          2/6/2024    09:14 11/7/2024    09:10   Lipid Panel   Total Cholesterol 186  175    Triglycerides 95  90    HDL Cholesterol " 64  57    VLDL Cholesterol 17  17    LDL Cholesterol  105  101    LDL/HDL Ratio 1.61  1.75       Lab Results   Component Value Date    TSH 3.770 11/07/2024    TSH 2.860 12/11/2023    TSH 3.120 11/16/2022      Lab Results   Component Value Date    FREET4 1.25 11/07/2024    FREET4 1.36 12/11/2023    FREET4 1.29 11/16/2022      A1C Last 3 Results          12/11/2023    09:05 11/7/2024    09:10   HGBA1C Last 3 Results   Hemoglobin A1C 6.30  6.20                        Visit Diagnoses:    ICD-10-CM ICD-9-CM   1. Primary hypertension  I10 401.9   2. IFG (impaired fasting glucose)  R73.01 790.21   3. Mixed hyperlipidemia  E78.2 272.2   4. Hypothyroidism due to Hashimoto's thyroiditis  E06.3 245.2   5. Other depression  F32.89 311   6. Vitamin B12 deficiency  E53.8 266.2   7. Arthritis of carpometacarpal (CMC) joint of both thumbs  M18.0 716.94   8. Primary osteoarthritis of both hands  M19.041 715.14    M19.042    9. Bilateral shoulder bursitis  M75.51 726.10    M75.52        Assessment and Plan   Diagnoses and all orders for this visit:    1. Primary hypertension (Primary)  -     TSH+Free T4; Future  -     Lipid Panel; Future  -     Comprehensive Metabolic Panel; Future  -     CBC & Differential; Future  -     Hemoglobin A1c; Future    2. IFG (impaired fasting glucose)  -     TSH+Free T4; Future  -     Lipid Panel; Future  -     Comprehensive Metabolic Panel; Future  -     CBC & Differential; Future  -     Hemoglobin A1c; Future    3. Mixed hyperlipidemia  -     TSH+Free T4; Future  -     Lipid Panel; Future  -     Comprehensive Metabolic Panel; Future  -     CBC & Differential; Future  -     Hemoglobin A1c; Future    4. Hypothyroidism due to Hashimoto's thyroiditis  -     TSH+Free T4; Future  -     Lipid Panel; Future  -     Comprehensive Metabolic Panel; Future  -     CBC & Differential; Future  -     Hemoglobin A1c; Future    5. Other depression  -     TSH+Free T4; Future  -     Lipid Panel; Future  -     Comprehensive  Metabolic Panel; Future  -     CBC & Differential; Future  -     Hemoglobin A1c; Future    6. Vitamin B12 deficiency  -     TSH+Free T4; Future  -     Lipid Panel; Future  -     Comprehensive Metabolic Panel; Future  -     CBC & Differential; Future  -     Hemoglobin A1c; Future    7. Arthritis of carpometacarpal (CMC) joint of both thumbs  -     TSH+Free T4; Future  -     Lipid Panel; Future  -     Comprehensive Metabolic Panel; Future  -     CBC & Differential; Future  -     Hemoglobin A1c; Future    8. Primary osteoarthritis of both hands  -     TSH+Free T4; Future  -     Lipid Panel; Future  -     Comprehensive Metabolic Panel; Future  -     CBC & Differential; Future  -     Hemoglobin A1c; Future    9. Bilateral shoulder bursitis  -     Ambulatory Referral to Orthopedic Surgery    Other orders  -     cefdinir (OMNICEF) 300 MG capsule; Take 1 capsule by mouth 2 (Two) Times a Day.  Dispense: 20 capsule; Refill: 0    Bilateral shoulder bursitis impingement syndrome diagnosis and treatment options with therapy injections discussed November 13, 2024 will put referral in for Ortho, Gonzalez,    Sinus infection ear pain right sided, consider retreatment has already taken antibiotics prior, November 13, 2024, Omnicef 300 twice a day for 10 days,    Hand wrist pain, discussion regarding need for documentation for previous injury, workers comp issues, April 10, 2024 patient was then here telling previous physician March, March 20, 2024, about her injury to her hand from a chronic overuse injury 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 Woodard in [place], this is definitely a work related injury and would qualify and should qualify through  Worker's Comp. claims with her work,--- patient previously had steroid shots in September 2023 and again in February 2024,, patient's hand pain has gotten worse and worse due to her job occupation despite getting steroid shots in September and even again in February of this past year,, workup for rheumatoid negative - November 7, 2024, sed rate 7,    Preop clearance for upcoming hand surgery by orthopedics, she needs an EKG and a CMP done, EKG was performed today showing a sinus rhythm normal EKG, March 27, 2024--- patient says meloxicam does not help, orders were to be faxed to [phone] ----- Dr. Campos gave her some gabapentin 300 mg up to 3 times a day, not helping either, in addition to Mobic,------- will send in some Decadron, until patient can get into surgery, March 27, 2024,     Oral HSV 1,, uses oral Valtrex as needed     abdominal pains May 9, 2023 --- ultrasound of the gallbladder was performed Su 15, 2023, ultrasound shows pancreas obscured by bowel gas no evidence of gallstones or cholecystitis, no biliary ductal dilation, nuclear medicine HIDA scan, normal examination Su 15, 2023 Margarito, labs noted normal liver function,     Arthritic pain, shoulder pain, will refill ibuprofen 800 mg 3 times daily as needed, also tried Mobic which did not help,     Mixed hyperlipidemia continues  Crestor 20 mg daily,     Coronary artery calcifications  CT scan ----discussed---- cardiac risks atherosclerosis etc. changing statins continue, December 16, 2022      Hypothyroidism continues levothyroxine 25 mcg daily     Depression, continues Paxil 40 mg daily,--- consider switching to Cymbalta to help with pain thresholds, discussed June 13, 2024, she did try put back on Paxil November 2024     Impaired fasting glucose hemoglobin A1c at 6.2, November 7, 2024      Gastroesophageal reflux disease continues omeprazole 40 mg daily     Hypertension continues lisinopril HCTZ 20-12.5 mg daily,     Pulmonary nodule, 3 mm  left lower lobe----CAT scan December 23, 2021, --- CT scan December 14, 2022 showed coronary artery calcifications finding consistent with prior granulomatous disease no new or enlarging pulmonary nodules--- the previously identified 3 mm left lower lobe nodule was not identified December 2022, CT chest April 16, 2024 shows no suspicious pulmonary nodules stable tiny benign calcified granuloma within the inferior aspect of the left lower lobe coronary artery calcifications are noted     Vitamin B12 at 262 --dec 2022 --- continues over-the-counter B12 supplements daily, improved, June 2023,     Tobacco use,--smoked total of 37 years, quit about 2018,  gained some weight,     Does not want a mammogram at this time, discussed December 2023                 Follow Up   No follow-ups on file.  Patient was given instructions and counseling regarding her condition or for health maintenance advice. Please see specific information pulled into the AVS if appropriate.

## 2024-11-25 ENCOUNTER — TELEPHONE (OUTPATIENT)
Dept: INTERNAL MEDICINE | Age: 59
End: 2024-11-25
Payer: COMMERCIAL

## 2024-11-25 NOTE — TELEPHONE ENCOUNTER
Caller: Gualberto Gloria    Relationship: Self    Best call back number: [phone]     Which medication are you concerned about: DEXAMETHASONE    Who prescribed you this medication: AJAY RODRIGUEZ    When did you start taking this medication:     What are your concerns: MEDICATION CAUSES FACE TO TURN RED, PUFFY AND HOT. PATIENT TOOK HALF AND IT STILL CAUSED SYMPTOMS AND THEN CUT IN HALF AGAIN, STILL RED, PUFFY AND HOT.     PLEASE CALL AND ADVISE    How long have you had these concerns:

## 2024-12-10 ENCOUNTER — OFFICE VISIT (OUTPATIENT)
Dept: ORTHOPEDIC SURGERY | Facility: CLINIC | Age: 59
End: 2024-12-10
Payer: COMMERCIAL

## 2024-12-10 VITALS
DIASTOLIC BLOOD PRESSURE: 88 MMHG | WEIGHT: 198 LBS | HEART RATE: 79 BPM | SYSTOLIC BLOOD PRESSURE: 117 MMHG | HEIGHT: 65 IN | OXYGEN SATURATION: 95 % | BODY MASS INDEX: 32.99 KG/M2

## 2024-12-10 DIAGNOSIS — M25.512 LEFT SHOULDER PAIN, UNSPECIFIED CHRONICITY: ICD-10-CM

## 2024-12-10 DIAGNOSIS — M25.511 RIGHT SHOULDER PAIN, UNSPECIFIED CHRONICITY: Primary | ICD-10-CM

## 2024-12-10 DIAGNOSIS — M25.819 SHOULDER IMPINGEMENT: ICD-10-CM

## 2024-12-10 RX ORDER — DICLOFENAC SODIUM 75 MG/1
75 TABLET, DELAYED RELEASE ORAL 2 TIMES DAILY
Qty: 60 TABLET | Refills: 1 | Status: SHIPPED | OUTPATIENT
Start: 2024-12-10

## 2024-12-10 NOTE — PROGRESS NOTES
"Chief Complaint  Initial Evaluation of the Left Shoulder and Initial Evaluation of the Right Shoulder     Subjective      Gualberto Gloria presents to [facility] for an evaluation  of bilateral shoulders have been bothering her for several months but denies any specific injury, trauma, falls or prior surgery. She states her pain radiates down her arms and to her elbow. She has pain at nighttime and is not able to lay on her shoulders due to the pain.     Allergies   Allergen Reactions    Dexamethasone Rash     Face turns red and hot        Social History     Socioeconomic History    Marital status:    Tobacco Use    Smoking status: Former     Current packs/day: 0.00     Average packs/day: 1 pack/day for 37.0 years (37.0 ttl pk-yrs)     Types: Cigarettes     Start date: 1980     Quit date: 2017     Years since quittin.0     Passive exposure: Past    Smokeless tobacco: Never   Vaping Use    Vaping status: Never Used   Substance and Sexual Activity    Alcohol use: Not Currently    Drug use: Never    Sexual activity: Defer        I reviewed the patient's chief complaint, history of present illness, review of systems, past medical history, surgical history, family history, social history, medications, and allergy list.     Review of Systems     Constitutional: Denies fevers, chills, weight loss  Cardiovascular: Denies chest pain, shortness of breath  Skin: Denies rashes, acute skin changes  Neurologic: Denies headache, loss of consciousness  MSK: Bilateral shoulder pain       Vital Signs:   /88   Pulse 79   Ht 165.2 cm (65.04\")   Wt 89.8 kg (198 lb)   SpO2 95%   BMI 32.91 kg/m²            Ortho Exam    Physical Exam  General:Alert. No acute distress     Right upper extremity; non tender, forward elevation  to 175 degrees, abduction to 155 degrees, external rotation  to 85 degrees, internal rotation to 75, 5/5 rotator cuff strength, internal rotation to L3, " mild pain with impingement, mild pain with impingement, mild pain with cross arm, neurovascularly intact, sensation intact to the medial, radial and ulnar nerve.     Left upper extremity: forward elevation  to 175 degrees, abduction to 155 degrees, external rotation  to 80 degrees, internal rotation to 75 degrees, positive  impingement, 5/5 rotator cuff strength, internal rotation to L3, mild pain with cross arm, neurovascularly intact, mildly limited cervical range of motion, sensation intact to the medial, radial and ulnar nerve     Procedures    X-Ray Report:  Right scapula X-Ray  Indication: Evaluation of right shoulder pain   AP/Lateral view(s)  Findings: mild to moderate degenerative changes to the AC joint, no acute fracture   Prior studies available for comparison: no     X-Ray Report:  Left scapula X-Ray  Indication: Evaluation of left shoulder pain   AP/Lateral view(s)  Findings: mild to moderate degenerative changes to the AC joint, no acute fracture   Prior studies available for comparison: no       Imaging Results (Most Recent)       Procedure Component Value Units Date/Time    XR Scapula Right [[id number]] Resulted: 12/10/24 0955     Updated: 12/10/24 0957    XR Scapula Left [[id number]] Resulted: 12/10/24 0955     Updated: 12/10/24 0957             Result Review :       No results found.           Assessment and Plan     Diagnoses and all orders for this visit:    1. Right shoulder pain, unspecified chronicity (Primary)  -     XR Scapula Right    2. Left shoulder pain, unspecified chronicity  -     XR Scapula Left    3. Shoulder impingement      The patient presents here today for an evaluation  of bilateral shoulder. X-rays were obtained in the office today and these were reviewed today.     Discussed the risks and benefits of bilateral shoulder steroid injection today in the office. Patient expressed understanding and wishes to proceed. She tolerated the injections well and without any complications.      Home exercises given today as she declined formal physical therapy.     Diclofenac sent into the pharmacy today.     Call or return if worsening symptoms.    Follow Up     6 - 8 weeks     Patient was given instructions and counseling regarding her condition or for health maintenance advice. Please see specific information pulled into the AVS if appropriate.     Scribed for Geovanni Gonzalez MD by Tamica Mccauley.  12/10/24   10:06 EST    I have personally performed the services described in this document as scribed by the above individual and it is both accurate and complete. Geovanni Gonzalez MD 12/11/24

## 2024-12-30 ENCOUNTER — TELEPHONE (OUTPATIENT)
Dept: ORTHOPEDIC SURGERY | Facility: CLINIC | Age: 59
End: 2024-12-30
Payer: COMMERCIAL

## 2024-12-30 NOTE — TELEPHONE ENCOUNTER
Called and left a message for the patient regarding the Diclofenac. Advised that we can try a different anti inflammatory but we would not be able to send in anything stronger and to reach out her her family doctor. If patient calls back, okay for the hub to give message.

## 2024-12-30 NOTE — TELEPHONE ENCOUNTER
Caller: Gualberto Gloria    Relationship: Self    Best call back number: [phone]    What was the call regarding: PT CALLING TO RELAY THAT THE DICLOFENAC THAT WAS PRESCRIBED BY DR PALAFOX IS NOT HELPING MUCH AT ALL. PT STATES THAT IT IS ALSO GIVING HER HEADACHES AND NOT RELIEVING THE PAIN. PLEASE CONTACT PT TO DISCUSS OTHER OPTIONS FOR PAIN RELIEF.

## 2025-01-29 ENCOUNTER — TELEPHONE (OUTPATIENT)
Dept: INTERNAL MEDICINE | Age: 60
End: 2025-01-29
Payer: COMMERCIAL

## 2025-01-29 DIAGNOSIS — I25.10 CORONARY ATHEROSCLEROSIS DUE TO CALCIFIED CORONARY LESION: ICD-10-CM

## 2025-01-29 DIAGNOSIS — I25.84 CORONARY ATHEROSCLEROSIS DUE TO CALCIFIED CORONARY LESION: ICD-10-CM

## 2025-01-29 DIAGNOSIS — I10 PRIMARY HYPERTENSION: Primary | ICD-10-CM

## 2025-01-29 NOTE — TELEPHONE ENCOUNTER
Caller: Gualberto Gloria    Relationship: Self    Best call back number: [phone]    What is the medical concern/diagnosis: CALCIUM BUILD UP IN [place]     What specialty or service is being requested: CARDIOLOGY     What is the provider, practice or medical service name: DR. LUBIN     What is the office location:     [address] [place] [address]       What is the office phone number: [phone]

## 2025-01-29 NOTE — TELEPHONE ENCOUNTER
Caller: Gualberto Gloria    Relationship: Self    Best call back number: [phone]    What is the best time to reach you: ANY    Who are you requesting to speak with (clinical staff, provider,  specific staff member): CLINICAL     What was the call regarding: PATIENT WANTED TO LET MD RODRIGUEZ KNOW SHE WENT TO DR. PALAFOX AND GOT A SHOT IN HER SHOULDER. SHE STATED SHE IS STILL HAVING A LOT OF PAIN IN HER ARMS AND SHOULDER EVEN AFTER THE SHOT. HER ARMS FEEL HEAVY. SHE WOULD LIKE MD CORONEL RECOMMENDATION ON WHAT TO DO.

## 2025-01-30 NOTE — TELEPHONE ENCOUNTER
Spoke with PT, she is asking for a referral to Cardiology - Dr Hien isaac I place referral.    Pt states having severe shoulder pain, seen Gonzalez , shot was given , mild relief. Doing home exercise and is in extreme. What would you suggest for pt, Please advise.

## 2025-01-30 NOTE — TELEPHONE ENCOUNTER
Message given to PT, has upcoming appt for shoulder pain on Monday     Please sign referral to Dr Stanford

## 2025-02-03 ENCOUNTER — OFFICE VISIT (OUTPATIENT)
Dept: INTERNAL MEDICINE | Age: 60
End: 2025-02-03
Payer: COMMERCIAL

## 2025-02-03 VITALS
WEIGHT: 198 LBS | SYSTOLIC BLOOD PRESSURE: 116 MMHG | HEART RATE: 82 BPM | HEIGHT: 65 IN | DIASTOLIC BLOOD PRESSURE: 79 MMHG | TEMPERATURE: 98.2 F | OXYGEN SATURATION: 95 % | BODY MASS INDEX: 32.99 KG/M2

## 2025-02-03 DIAGNOSIS — M75.51 BILATERAL SHOULDER BURSITIS: Primary | ICD-10-CM

## 2025-02-03 DIAGNOSIS — M19.041 PRIMARY OSTEOARTHRITIS OF BOTH HANDS: ICD-10-CM

## 2025-02-03 DIAGNOSIS — M75.52 BILATERAL SHOULDER BURSITIS: Primary | ICD-10-CM

## 2025-02-03 DIAGNOSIS — M54.12 CERVICAL RADICULOPATHY: ICD-10-CM

## 2025-02-03 DIAGNOSIS — M19.042 PRIMARY OSTEOARTHRITIS OF BOTH HANDS: ICD-10-CM

## 2025-02-03 DIAGNOSIS — M12.812 ROTATOR CUFF ARTHROPATHY OF LEFT SHOULDER: ICD-10-CM

## 2025-02-03 DIAGNOSIS — R52 PAIN: ICD-10-CM

## 2025-02-03 DIAGNOSIS — M54.2 NECK PAIN, BILATERAL: ICD-10-CM

## 2025-02-03 PROCEDURE — 99214 OFFICE O/P EST MOD 30 MIN: CPT | Performed by: INTERNAL MEDICINE

## 2025-02-03 NOTE — PROGRESS NOTES
"Chief Complaint   Patient presents with    Pain     Discuss options for both shoulders, left shoulder is worse, ROM is compromised. Pt did get injections for both shoulder 12/10/2024. Pt states mild relief.         Objective   Vital Signs  Vitals:    02/03/25 1033   BP: 116/79   BP Location: Right arm   Patient Position: Sitting   Pulse: 82   Temp: 98.2 °F (36.8 °C)   SpO2: 95%   Weight: 89.8 kg (198 lb)   Height: 165.2 cm (65.04\")      Body mass index is 32.91 kg/m².  Review of Systems neck pain left shoulder pain going down the the left arm,  Physical Exam patient has pain with abduction flexion of the left shoulder and passive compression of the shoulders, she has some tenderness to palpation of the left lateral shoulder subacromial bursa area no supraspinatus tenderness or biceps tendon tenderness, neck is supple without any adenopathy,  Result Review :   No results found for: \"PROBNP\", \"BNP\"  CMP          3/27/2024    11:29 11/7/2024    09:10   CMP   Glucose 92  95    BUN 16  18    Creatinine 1.02  0.89    EGFR 63.5  74.3    Sodium 138  137    Potassium 4.0  4.4    Chloride 102  104    Calcium 9.3  9.5    Total Protein 6.4  6.6    Albumin 4.2  3.8    Globulin 2.2  2.8    Total Bilirubin 0.3  0.3    Alkaline Phosphatase 91  111    AST (SGOT) 20  26    ALT (SGPT) 19  21    Albumin/Globulin Ratio 1.9  1.4    BUN/Creatinine Ratio 15.7  20.2    Anion Gap 10.0  7.2      CBC w/diff          3/20/2024    14:44 11/7/2024    09:10   CBC w/Diff   WBC 5.87  4.21    RBC 4.08  4.06    Hemoglobin 13.0  12.7    Hematocrit 37.9  37.3    MCV 92.9  91.9    MCH 31.9  31.3    MCHC 34.3  34.0    RDW 13.0  12.0    Platelets 251  234    Neutrophil Rel % 57.5  48.8    Immature Granulocyte Rel % 0.2  0.2    Lymphocyte Rel % 30.5  36.3    Monocyte Rel % 8.9  10.7    Eosinophil Rel % 2.2  3.3    Basophil Rel % 0.7  0.7       Lipid Panel          11/7/2024    09:10   Lipid Panel   Total Cholesterol 175    Triglycerides 90    HDL " Cholesterol 57    VLDL Cholesterol 17    LDL Cholesterol  101    LDL/HDL Ratio 1.75       Lab Results   Component Value Date    TSH 3.770 11/07/2024    TSH 2.860 12/11/2023    TSH 3.120 11/16/2022      Lab Results   Component Value Date    FREET4 1.25 11/07/2024    FREET4 1.36 12/11/2023    FREET4 1.29 11/16/2022      A1C Last 3 Results          11/7/2024    09:10   HGBA1C Last 3 Results   Hemoglobin A1C 6.20                        Visit Diagnoses:    ICD-10-CM ICD-9-CM   1. Bilateral shoulder bursitis  M75.51 726.10    M75.52    2. Primary osteoarthritis of both hands  M19.041 715.14    M19.042    3. Pain  R52 780.96   4. Neck pain, bilateral  M54.2 723.1   5. Cervical radiculopathy  M54.12 723.4   6. Rotator cuff arthropathy of left shoulder  M12.812 716.81       Assessment and Plan   Diagnoses and all orders for this visit:    1. Bilateral shoulder bursitis (Primary)  -     CBC & Differential; Future  -     Sedimentation Rate; Future  -     XR Chest PA & Lateral; Future  -     MRI Cervical Spine Without Contrast; Future  -     MRI Shoulder Left Without Contrast; Future  -     Ambulatory Referral to Physical Therapy for Evaluation & Treatment    2. Primary osteoarthritis of both hands  -     CBC & Differential; Future  -     Sedimentation Rate; Future  -     XR Chest PA & Lateral; Future  -     MRI Cervical Spine Without Contrast; Future  -     MRI Shoulder Left Without Contrast; Future  -     Ambulatory Referral to Physical Therapy for Evaluation & Treatment    3. Pain  -     CBC & Differential; Future  -     Sedimentation Rate; Future  -     XR Chest PA & Lateral; Future  -     MRI Cervical Spine Without Contrast; Future  -     MRI Shoulder Left Without Contrast; Future  -     Ambulatory Referral to Physical Therapy for Evaluation & Treatment    4. Neck pain, bilateral  -     CBC & Differential; Future  -     Sedimentation Rate; Future  -     XR Chest PA & Lateral; Future  -     MRI Cervical Spine Without  Contrast; Future  -     MRI Shoulder Left Without Contrast; Future  -     Ambulatory Referral to Physical Therapy for Evaluation & Treatment    5. Cervical radiculopathy  -     CBC & Differential; Future  -     Sedimentation Rate; Future  -     XR Chest PA & Lateral; Future  -     MRI Cervical Spine Without Contrast; Future  -     MRI Shoulder Left Without Contrast; Future  -     Ambulatory Referral to Physical Therapy for Evaluation & Treatment    6. Rotator cuff arthropathy of left shoulder  -     CBC & Differential; Future  -     Sedimentation Rate; Future  -     XR Chest PA & Lateral; Future  -     MRI Cervical Spine Without Contrast; Future  -     MRI Shoulder Left Without Contrast; Future  -     Ambulatory Referral to Physical Therapy for Evaluation & Treatment      Plan is to go ahead and get a chest x-ray as precaution given the nature of the pain in her shoulder and neck area to make sure there is nothing in the apical region of the chest causing any compression type pain or neuropathy, organ to get an MRI of her cervical spine and an MRI of her left shoulder I am putting a referral into physical therapy she can continue taking diclofenac twice a day putting heat ice what ever feels good to the left shoulder and neck area and continue range of motion of possible                 Follow Up   Return for Next scheduled follow up.  Patient was given instructions and counseling regarding her condition or for health maintenance advice. Please see specific information pulled into the AVS if appropriate.

## 2025-02-24 ENCOUNTER — TELEPHONE (OUTPATIENT)
Dept: INTERNAL MEDICINE | Age: 60
End: 2025-02-24

## 2025-02-24 NOTE — TELEPHONE ENCOUNTER
Caller: Gualberto Gloria    Relationship: Self    Best call back number: [phone]     What is the best time to reach you: ANY    Who are you requesting to speak with (clinical staff, provider,  specific staff member): CLINICAL STAFF    What was the call regarding: PATIENT CALLED STATING THAT HER SHOULDER PAIN IS GETTING WORSE. SHE WOULD LIKE TO KNOW IF THERE IS A SHOT THAT SHE CAN BE GIVEN FOR PAIN OR WHAT ELSE CAN BE DONE TILL HER IMAGING APPOINTMENT

## 2025-03-07 ENCOUNTER — HOSPITAL ENCOUNTER (OUTPATIENT)
Dept: MRI IMAGING | Facility: HOSPITAL | Age: 60
Discharge: HOME | End: 2025-03-07
Payer: COMMERCIAL

## 2025-03-07 DIAGNOSIS — M54.2 NECK PAIN, BILATERAL: ICD-10-CM

## 2025-03-07 DIAGNOSIS — M54.12 CERVICAL RADICULOPATHY: ICD-10-CM

## 2025-03-07 DIAGNOSIS — M12.812 ROTATOR CUFF ARTHROPATHY OF LEFT SHOULDER: ICD-10-CM

## 2025-03-07 DIAGNOSIS — M75.51 BILATERAL SHOULDER BURSITIS: ICD-10-CM

## 2025-03-07 DIAGNOSIS — M75.52 BILATERAL SHOULDER BURSITIS: ICD-10-CM

## 2025-03-07 DIAGNOSIS — M19.041 PRIMARY OSTEOARTHRITIS OF BOTH HANDS: ICD-10-CM

## 2025-03-07 DIAGNOSIS — R52 PAIN: ICD-10-CM

## 2025-03-07 DIAGNOSIS — M19.042 PRIMARY OSTEOARTHRITIS OF BOTH HANDS: ICD-10-CM

## 2025-03-07 PROCEDURE — 73221 MRI JOINT UPR EXTREM W/O DYE: CPT

## 2025-03-07 PROCEDURE — 72141 MRI NECK SPINE W/O DYE: CPT

## 2025-03-10 ENCOUNTER — RESULTS FOLLOW-UP (OUTPATIENT)
Dept: MRI IMAGING | Facility: HOSPITAL | Age: 60
End: 2025-03-10
Payer: COMMERCIAL

## 2025-03-10 DIAGNOSIS — M75.100 NONTRAUMATIC TEAR OF ROTATOR CUFF, UNSPECIFIED LATERALITY, UNSPECIFIED TEAR EXTENT: Primary | ICD-10-CM

## 2025-03-10 DIAGNOSIS — S43.432A TEAR OF LEFT GLENOID LABRUM, INITIAL ENCOUNTER: ICD-10-CM

## 2025-03-18 ENCOUNTER — OFFICE VISIT (OUTPATIENT)
Dept: ORTHOPEDIC SURGERY | Facility: CLINIC | Age: 60
End: 2025-03-18
Payer: COMMERCIAL

## 2025-03-18 ENCOUNTER — PREP FOR SURGERY (OUTPATIENT)
Dept: OTHER | Facility: HOSPITAL | Age: 60
End: 2025-03-18
Payer: COMMERCIAL

## 2025-03-18 VITALS
HEIGHT: 65 IN | WEIGHT: 198 LBS | DIASTOLIC BLOOD PRESSURE: 83 MMHG | OXYGEN SATURATION: 93 % | HEART RATE: 84 BPM | SYSTOLIC BLOOD PRESSURE: 120 MMHG | BODY MASS INDEX: 32.99 KG/M2

## 2025-03-18 DIAGNOSIS — S43.432A TEAR OF LEFT GLENOID LABRUM, INITIAL ENCOUNTER: ICD-10-CM

## 2025-03-18 DIAGNOSIS — M75.122 NONTRAUMATIC COMPLETE TEAR OF LEFT ROTATOR CUFF: Primary | ICD-10-CM

## 2025-03-18 RX ADMIN — LIDOCAINE HYDROCHLORIDE 5 ML: 10 INJECTION, SOLUTION INFILTRATION; PERINEURAL at 15:50

## 2025-03-18 RX ADMIN — LIDOCAINE HYDROCHLORIDE 5 ML: 10 INJECTION, SOLUTION INFILTRATION; PERINEURAL at 15:49

## 2025-03-18 RX ADMIN — TRIAMCINOLONE ACETONIDE 40 MG: 40 INJECTION, SUSPENSION INTRA-ARTICULAR; INTRAMUSCULAR at 15:50

## 2025-03-18 RX ADMIN — TRIAMCINOLONE ACETONIDE 40 MG: 40 INJECTION, SUSPENSION INTRA-ARTICULAR; INTRAMUSCULAR at 15:49

## 2025-03-18 NOTE — PROGRESS NOTES
Chief Complaint  Follow-up and Pain of the Left Shoulder and Follow-up and Pain of the Right Shoulder       Subjective      Gualberto Gloria presents to [facility] for a follow up bilateral shoulder. She has been treating her bilateral shoulder pain and impingement conservatively. She was last seen in the office on 12/10/24 where she had bilateral shoulder steroid injections. She states these injections didn't give her much relief. She states her left shoulder is worse than her right shoulder. She states she had pain with overhead range of motion, reaching and lifting. She states her pain radiates up her neck. She has been taking over the counter medications without much relief. She recently had an MRI on her left shoulder and her cervical spine that was ordered by her family doctor. She has numbness and tingling to her arm. She states her pain radiates down her arms and to her elbow. She has pain at nighttime and is not able to lay on her shoulders due to the pain.     Allergies   Allergen Reactions    Dexamethasone Rash     Face turns red and hot        Social History     Socioeconomic History    Marital status:    Tobacco Use    Smoking status: Former     Current packs/day: 0.00     Average packs/day: 1 pack/day for 37.0 years (37.0 ttl pk-yrs)     Types: Cigarettes     Start date: 1980     Quit date: 2017     Years since quittin.2     Passive exposure: Past    Smokeless tobacco: Never   Vaping Use    Vaping status: Never Used   Substance and Sexual Activity    Alcohol use: Not Currently    Drug use: Never    Sexual activity: Defer        I reviewed the patient's chief complaint, history of present illness, review of systems, past medical history, surgical history, family history, social history, medications, and allergy list.     Review of Systems     Constitutional: Denies fevers, chills, weight loss  Cardiovascular: Denies chest pain, shortness of  "breath  Skin: Denies rashes, acute skin changes  Neurologic: Denies headache, loss of consciousness  MSK: bilateral shoulder pain       Vital Signs:   /83   Pulse 84   Ht 165.1 cm (65\")   Wt 89.8 kg (198 lb)   SpO2 93%   BMI 32.95 kg/m²            Ortho Exam    Physical Exam  General:Alert. No acute distress   Right upper extremity; non tender, forward elevation to 175 degrees, abduction to 155 degrees, external rotation to 85 degrees, internal rotation to 75, 5/5 rotator cuff strength, internal rotation to L3, mild pain with impingement, mild pain with impingement, mild pain with cross arm, neurovascularly intact, sensation intact to the medial, radial and ulnar nerve.     Left upper extremity: forward elevation to 175 degrees, abduction to 155 degrees, external rotation to 80 degrees, internal rotation to 75 degrees, positive impingement, 5/5 rotator cuff strength, internal rotation to L3, mild pain with cross arm, neurovascularly intact, mildly limited cervical range of motion, sensation intact to the medial, radial and ulnar nerve     Large Joint  Date/Time: 3/18/2025 3:49 PM  Consent given by: patient  Site marked: site marked  Timeout: Immediately prior to procedure a time out was called to verify the correct patient, procedure, equipment, support staff and site/side marked as required   Supporting Documentation  Indications: pain   Procedure Details  Location: shoulder - Shoulder joint: Right.  Needle gauge: 21 G.  Medications administered: 40 mg triamcinolone acetonide 40 MG/ML; 5 mL lidocaine 1 %  Patient tolerance: patient tolerated the procedure well with no immediate complications      Large Joint  Date/Time: 3/18/2025 3:50 PM  Consent given by: patient  Site marked: site marked  Timeout: Immediately prior to procedure a time out was called to verify the correct patient, procedure, equipment, support staff and site/side marked as required   Supporting Documentation  Indications: pain "   Procedure Details  Location: shoulder - Shoulder joint: Left.  Preparation: Patient was prepped and draped in the usual sterile fashion  Needle gauge: 21 G.  Medications administered: 5 mL lidocaine 1 %; 40 mg triamcinolone acetonide 40 MG/ML  Patient tolerance: patient tolerated the procedure well with no immediate complications    This injection documentation was Scribed for Geovanni Gonzalez MD by Gilda Wolff MA.  03/18/25   15:50 EDT   Imaging Results (Most Recent)       None             Result Review :       MRI Cervical Spine Without Contrast  Result Date: 3/11/2025  Narrative: MRI CERVICAL SPINE WO CONTRAST Date of Exam: 3/7/2025 10:20 AM EST Indication: Cervical radiculopathy.  Comparison: None available. Technique:  Routine multiplanar/multisequence sequence images of the cervical spine were obtained without contrast administration.  Findings: Spondylotic endplate changes are present, without significant component of marrow edema. T1 marrow signal is otherwise preserved, without evidence of fracture or suspicious marrow replacing lesion. Straightening is present in addition to some minimal retrolisthesis of C5 on C6. The cervical spinal cord demonstrates no focal areas of intramedullary signal abnormality. The paraspinal soft tissues demonstrate no acute or suspicious findings. Multilevel spondylosis change is present, with areas of involvement noted including C2-3, no significant spinal canal or neuroforaminal impingement. C3-4, no significant spinal canal or neuroforaminal impingement. C4-5, no significant spinal canal or neuroforaminal impingement. C5-6, disc osteophyte complex and bilateral facet arthropathy. There is mild spinal canal narrowing in addition to moderate left and mild right neuroforaminal stenosis. C6-7, no significant spinal canal or neuroforaminal impingement.     Impression: Impression: Cervical spondylosis is present, somewhat focally notable at the C5-6 level where a  prominent disc osteophyte complex and some facet arthropathy resulting mild spinal canal narrowing and moderate left neuroforaminal stenosis. Electronically Signed: Jaziel Song MD  3/11/2025 9:26 PM EDT  Workstation ID: [id number]    MRI Shoulder Left Without Contrast  Result Date: 3/10/2025  Narrative: MRI SHOULDER LEFT WO CONTRAST Date of Exam: 3/7/2025 10:20 AM EST Indication: Rotator cuff strain pain.  Comparison: None available. Technique:  Routine multiplanar/multisequence images of the left shoulder were obtained without contrast administration.  Findings: There is a full-thickness tear involving the supraspinatus component of the rotator cuff. The proximal torn tendon is retracted towards the acromiohumeral space. Increased overlying bursal fluid is noted. Changes of tendinopathy are seen throughout the rotator cuff. The biceps anchor is intact. There is no evidence of biceps tendon tear or distal retraction. The tendon is identified in the expected position in the intertubercular sulcus. There is abnormal signal and morphology throughout the posterior labrum indicating a posterior labral tear. These findings may indicate changes of chronic posterior impingement. The glenohumeral joint is intact. Mild changes of osteoarthritis are noted. There is no significant joint effusion. Increased signal is noted corresponding to the inferior glenohumeral ligament and inferior joint capsule. The findings suggest potential changes of adhesive capsulitis. Increased subacromial/subdeltoid bursal fluid is noted. The acromioclavicular joint is intact. Mild hypertrophic age-related changes are noted. No abnormal bone marrow signal is seen. The cortical margins are grossly intact. There is mild to moderate atrophy throughout the rotator cuff musculature. The surrounding soft tissues are unremarkable.     Impression: Impression: 1.There is a full-thickness tear involving the supraspinatus component of the rotator cuff. 2.No  evidence of biceps tendon tear. 3.Evidence for a posterior labral tear. These findings may indicate changes of chronic posterior impingement. 4.Findings suggesting potential changes of adhesive capsulitis. 5.Mild degenerative changes of the shoulder are noted. Electronically Signed: John Solitario MD  3/10/2025 11:31 AM EDT  Workstation ID: [id number]             Assessment and Plan     Diagnoses and all orders for this visit:    1. Nontraumatic complete tear of left rotator cuff (Primary)    2. Tear of left glenoid labrum, initial encounter      The patient presents here today for a follow up for bilateral shoulder. MRI results was discussed and reviewed with the patient today in the office that was ordered by her family doctor.     Discussed operative treatment options regarding a left shoulder  arthroscopy with rotator cuff repair, possible labral debridement, possible biceps tendonesis versus tendotomy, subacromial decompression versus non operative treatment options regarding injections, oral medications and therapy.     Patient wishes to proceed with operative treatment options. Risks and benefits was discussed and reviewed with the patient today. Patient expressed understanding and wishes to proceed. She would like to schedule surgery after school is over at the end of May.     Discussed the risks and benefits of bilateral shoulder steroid injection today in the office. Patient expressed understanding and wishes to proceed. Patient tolerted the injection well and without any complications.     Discussed surgery., Risks/benefits discussed with patient including, but not limited to: infection, bleeding, neurovascular damage, re-rupture, aesthetic deformity, need for further surgery, and death., Surgery pamphlet given., Call or return if worsening symptoms., and DME order for a 3 in 1 given today due to patient will be confined to one room/level of the home that does not offer a toilet during postop recovery.      Follow Up     2 weeks post-operatively      Patient was given instructions and counseling regarding her condition or for health maintenance advice. Please see specific information pulled into the AVS if appropriate.     Scribed for Geovanni Gonzalez MD by Tamica Mccauley.  03/18/25   13:47 EDT    I have personally performed the services described in this document as scribed by the above individual and it is both accurate and complete. Geovanni Gonzalez MD 03/19/25

## 2025-03-20 ENCOUNTER — TELEPHONE (OUTPATIENT)
Dept: CARDIOLOGY | Facility: CLINIC | Age: 60
End: 2025-03-20
Payer: COMMERCIAL

## 2025-03-20 RX ORDER — TRIAMCINOLONE ACETONIDE 40 MG/ML
40 INJECTION, SUSPENSION INTRA-ARTICULAR; INTRAMUSCULAR
Status: COMPLETED | OUTPATIENT
Start: 2025-03-18 | End: 2025-03-18

## 2025-03-20 RX ORDER — LIDOCAINE HYDROCHLORIDE 10 MG/ML
5 INJECTION, SOLUTION INFILTRATION; PERINEURAL
Status: COMPLETED | OUTPATIENT
Start: 2025-03-18 | End: 2025-03-18

## 2025-03-20 NOTE — TELEPHONE ENCOUNTER
The [facility] received a fax that requires your attention. The document has been indexed to the patient’s chart for your review.      Reason for sending: EXTERNAL MEDICAL RECORD NOTIFICATION     Documents Description: CARDIAC CLEARANCE REQ-[facility]-3.20.25    Name of Sender: [facility]     Date Indexed: 3.20.25

## 2025-03-25 PROBLEM — I25.10 CORONARY ARTERY CALCIFICATION SEEN ON CT SCAN: Status: ACTIVE | Noted: 2025-03-25

## 2025-03-25 NOTE — PROGRESS NOTES
Chief Complaint  Hypertension, coronary atherosclerosis , and cardiac clear      History of Present Illness  Gualberto Gloria presents to [facility]  Patient is a 59-year-old with prior history of hypertension dyslipidemia chronic renal insufficiency 3a who recently underwent a CT of her chest with for follow-up lung nodule was found to have coronary calcification.  She has reported dyspnea on exertion symptoms for over a year but not associate with any anginal chest pain.  Denies PND orthopnea lower extremity edema.  She did have a mildly possibly suddenly from an MI at age 54.  Past Medical History:   Diagnosis Date    COPD (chronic obstructive pulmonary disease)     Hyperlipidemia     Hypertension          Current Outpatient Medications:     albuterol sulfate  (90 Base) MCG/ACT inhaler, 2 puffs  every 4-6 hours as needed for shortness of air, Disp: 18 g, Rfl: 1    aspirin 81 MG EC tablet, Take 1 tablet by mouth Every Other Day., Disp: 90 tablet, Rfl: 2    Cholecalciferol (Vitamin D3) 50 MCG (2000 UT) tablet, Take 1 tablet by mouth Every Other Day., Disp: 30 tablet, Rfl: 2    ibuprofen (ADVIL,MOTRIN) 800 MG tablet, Take 1 tablet by mouth Every 6 (Six) Hours As Needed for Mild Pain., Disp: , Rfl:     levothyroxine (SYNTHROID, LEVOTHROID) 25 MCG tablet, Take 1 tablet by mouth Daily., Disp: 90 tablet, Rfl: 3    lisinopril-hydrochlorothiazide (PRINZIDE,ZESTORETIC) 20-12.5 MG per tablet, Take 1 tablet by mouth Daily., Disp: 90 tablet, Rfl: 3    omeprazole (priLOSEC) 40 MG capsule, Take 1 capsule by mouth Daily., Disp: 90 capsule, Rfl: 3    PARoxetine (PAXIL) 40 MG tablet, TAKE 1 TABLET BY MOUTH ONCE DAILY IN THE MORNING, Disp: 90 tablet, Rfl: 3    atorvastatin (LIPITOR) 40 MG tablet, Take 1 tablet by mouth Daily., Disp: 90 tablet, Rfl: 3    Medications Discontinued During This Encounter   Medication Reason    atorvastatin (LIPITOR) 20 MG tablet      Allergies   Allergen Reactions     "Dexamethasone Rash     Face turns red and hot    Pt states she can take  a low dose and will be ok         Social History     Tobacco Use    Smoking status: Former     Current packs/day: 0.00     Average packs/day: 1 pack/day for 37.0 years (37.0 ttl pk-yrs)     Types: Cigarettes     Start date: 1980     Quit date: 2017     Years since quittin.3     Passive exposure: Past    Smokeless tobacco: Never   Vaping Use    Vaping status: Never Used   Substance Use Topics    Alcohol use: Not Currently    Drug use: Never       Family History   Problem Relation Age of Onset    Diabetes Mother     Heart disease Mother         Objective     /64   Pulse 75   Ht 165.1 cm (65\")   Wt 88.5 kg (195 lb)   BMI 32.45 kg/m²       Physical Exam    General Appearance:   no acute distress  Alert and oriented x3  HENT:   lips not cyanotic  Atraumatic  Neck:  No jvd   supple  Respiratory:  no respiratory distress  normal breath sounds  no rales  Cardiovascular:  Regular rate and rhythm  no S3, no S4   no murmur  no rub  Extremities  No cyanosis  lower extremity edema: none    Skin:   warm, dry  No rashes    Result Review :     No results found for: \"PROBNP\"  CMP          2024    09:10   CMP   Glucose 95    BUN 18    Creatinine 0.89    EGFR 74.3    Sodium 137    Potassium 4.4    Chloride 104    Calcium 9.5    Total Protein 6.6    Albumin 3.8    Globulin 2.8    Total Bilirubin 0.3    Alkaline Phosphatase 111    AST (SGOT) 26    ALT (SGPT) 21    Albumin/Globulin Ratio 1.4    BUN/Creatinine Ratio 20.2    Anion Gap 7.2      CBC w/diff          2024    09:10   CBC w/Diff   WBC 4.21    RBC 4.06    Hemoglobin 12.7    Hematocrit 37.3    MCV 91.9    MCH 31.3    MCHC 34.0    RDW 12.0    Platelets 234    Neutrophil Rel % 48.8    Immature Granulocyte Rel % 0.2    Lymphocyte Rel % 36.3    Monocyte Rel % 10.7    Eosinophil Rel % 3.3    Basophil Rel % 0.7       Lab Results   Component Value Date    TSH 3.770 2024    " "  Lab Results   Component Value Date    FREET4 1.25 11/07/2024      No results found for: \"DDIMERQUANT\"  No results found for: \"MG\"   No results found for: \"DIGOXIN\"   No results found for: \"TROPONINT\"   No results found for: \"POCTROP\"(       Lipid Panel          11/7/2024    09:10   Lipid Panel   Total Cholesterol 175    Triglycerides 90    HDL Cholesterol 57    VLDL Cholesterol 17    LDL Cholesterol  101    LDL/HDL Ratio 1.75             No results found for this or any previous visit.             The 10-year ASCVD risk score (Stacia VERDIN, et al., 2019) is: 3.1%    Values used to calculate the score:      Age: 59 years      Sex: Female      Is Non- : No      Diabetic: No      Tobacco smoker: No      Systolic Blood Pressure: 114 mmHg      Is BP treated: Yes      HDL Cholesterol: 57 mg/dL      Total Cholesterol: 175 mg/dL     Diagnoses and all orders for this visit:    1. Coronary artery calcification seen on CT scan (Primary)  Assessment & Plan:  Seen incidentally on CT scan she does report some dyspnea on exertion symptoms somewhat chronic over the last year no anginal symptoms recommend in addition to her preventative aspirin 81 once a day intensification of her cholesterol treatment to try to optimize to get LDL less than 70.  In addition she is having some shortness of breath symptoms with exertion we will get a noninvasive stress test as well as an echocardiogram to evaluate for any flow-limiting coronary artery disease or LV dysfunction.  If these do not show any significant I suspect her symptoms may be just from deconditioning      2. Mixed hyperlipidemia  Assessment & Plan:  Increasing Lipitor to 40 nightly repeat lipids LFTs on next visit     Orders:  -     Hepatic Function Panel; Future  -     Lipid Panel; Future    3. Primary hypertension  Assessment & Plan:  Blood pressure well-controlled and at goal ranges       4. ALBERTO (dyspnea on exertion)  -     Adult Transthoracic Echo " Complete W/ Cont if Necessary Per Protocol; Future  -     Stress Test With Myocardial Perfusion One Day; Future    Other orders  -     atorvastatin (LIPITOR) 40 MG tablet; Take 1 tablet by mouth Daily.  Dispense: 90 tablet; Refill: 3            Follow Up     Return in about 6 months (around 9/27/2025).          Patient was given instructions and counseling regarding her condition or for health maintenance advice. Please see specific information pulled into the AVS if appropriate.

## 2025-03-27 ENCOUNTER — OFFICE VISIT (OUTPATIENT)
Dept: CARDIOLOGY | Facility: CLINIC | Age: 60
End: 2025-03-27
Payer: COMMERCIAL

## 2025-03-27 VITALS
HEART RATE: 75 BPM | DIASTOLIC BLOOD PRESSURE: 64 MMHG | SYSTOLIC BLOOD PRESSURE: 114 MMHG | WEIGHT: 195 LBS | HEIGHT: 65 IN | BODY MASS INDEX: 32.49 KG/M2

## 2025-03-27 DIAGNOSIS — E78.2 MIXED HYPERLIPIDEMIA: ICD-10-CM

## 2025-03-27 DIAGNOSIS — R06.09 DOE (DYSPNEA ON EXERTION): ICD-10-CM

## 2025-03-27 DIAGNOSIS — I10 PRIMARY HYPERTENSION: ICD-10-CM

## 2025-03-27 DIAGNOSIS — I25.10 CORONARY ARTERY CALCIFICATION SEEN ON CT SCAN: Primary | ICD-10-CM

## 2025-03-27 PROCEDURE — 99204 OFFICE O/P NEW MOD 45 MIN: CPT | Performed by: INTERNAL MEDICINE

## 2025-03-27 RX ORDER — ATORVASTATIN CALCIUM 40 MG/1
40 TABLET, FILM COATED ORAL DAILY
Qty: 90 TABLET | Refills: 3 | Status: SHIPPED | OUTPATIENT
Start: 2025-03-27

## 2025-04-07 ENCOUNTER — TELEPHONE (OUTPATIENT)
Dept: CARDIOLOGY | Facility: CLINIC | Age: 60
End: 2025-04-07
Payer: COMMERCIAL

## 2025-04-07 NOTE — TELEPHONE ENCOUNTER
Caller: Gualberto Gloria    Relationship: Self    Best call back number: [phone]     What is the best time to reach you: ANY    Who are you requesting to speak with (clinical staff, provider,  specific staff member): ANY    Do you know the name of the person who called:     What was the call regarding: PATIENT CALLING IN ABOUT ECHO AND STRESS TEST. PATIENT WAS TOLD THAT THEIR PART OF THE TESTING IS $1,700 AND PATIENT CAN'T AFFORD THAT. PATIENT STATES THEY STILL WANT TO GET THE INFORMATION  BUT WILL HAVE TO DO A CHEAPER WAY OF TESTING. PLEASE CALL PATIENT TO ADVISE. THANK YOU!    Is it okay if the provider responds through [employer]: NO

## 2025-04-08 NOTE — TELEPHONE ENCOUNTER
I was calling to speak to patient about cost of testing. She is asking if this testing is mandatory before her surgery.

## 2025-04-23 ENCOUNTER — TELEPHONE (OUTPATIENT)
Dept: INTERNAL MEDICINE | Age: 60
End: 2025-04-23

## 2025-04-23 NOTE — TELEPHONE ENCOUNTER
Caller: Gualberto Gloria    Relationship to patient: Self    Best call back number: [phone]    Chief complaint: CONGESTION/COUGH, HEADACHE     Type of visit: OFFICE     Requested date: TOMORROW MORNING ANYTIME BEFORE 12PM     Additional notes: PATIENT IS OKAY WITH SEEING ANY PROVIDER. HUB UNABLE TO ACCOMMODATE REQUEST.

## 2025-04-25 ENCOUNTER — OFFICE VISIT (OUTPATIENT)
Dept: INTERNAL MEDICINE | Age: 60
End: 2025-04-25
Payer: COMMERCIAL

## 2025-04-25 VITALS
SYSTOLIC BLOOD PRESSURE: 110 MMHG | BODY MASS INDEX: 32.46 KG/M2 | OXYGEN SATURATION: 98 % | WEIGHT: 194.8 LBS | HEIGHT: 65 IN | DIASTOLIC BLOOD PRESSURE: 78 MMHG | HEART RATE: 76 BPM

## 2025-04-25 DIAGNOSIS — J01.40 ACUTE NON-RECURRENT PANSINUSITIS: Primary | ICD-10-CM

## 2025-04-25 PROCEDURE — 99213 OFFICE O/P EST LOW 20 MIN: CPT

## 2025-04-25 RX ORDER — FLUCONAZOLE 150 MG/1
150 TABLET ORAL ONCE
Qty: 1 TABLET | Refills: 0 | Status: SHIPPED | OUTPATIENT
Start: 2025-04-25 | End: 2025-04-25

## 2025-04-25 RX ORDER — PREDNISONE 20 MG/1
20 TABLET ORAL 2 TIMES DAILY
Qty: 10 TABLET | Refills: 0 | Status: SHIPPED | OUTPATIENT
Start: 2025-04-25 | End: 2025-04-30

## 2025-04-25 NOTE — PROGRESS NOTES
Chief Complaint  Recurrent Sinusitis (Symptoms have been going on for 2-3 days /Pt refused swabs /Having L shoulder surgery on May 16th ), Headache, Facial Pain, Ear Drainage (As well as throat drainage ), and Back Pain    History of Present Illness  SUBJECTIVE  Gualberto Gloria presents to [facility]     History of Present Illness  The patient presents for evaluation of a sinus infection.    She has been experiencing discomfort for several days, characterized by a sinus headache, tightness under her eyes, and a sensation of fluid running down the side of her face. Ear congestion and popping sounds are also reported, with uncertainty if it is due to clogging or drainage. Similar ear issues were noted during a previous sinus infection. Fever, chills, and body aches have been experienced, but no shortness of breath or wheezing. No nausea, vomiting, or diarrhea is reported. Over-the-counter medications have been taken, providing some relief.    She has a scheduled surgery on 05/19/2025 and an upcoming appointment for a stress test and echocardiogram prior to the surgery. There has been no contact with sick individuals.     A known allergy to Decadron is reported, causing facial flushing and redness. No allergies to antibiotics are noted, although certain antibiotics can trigger yeast infections.       Past Medical History:   Diagnosis Date    COPD (chronic obstructive pulmonary disease)     Hyperlipidemia     Hypertension       Family History   Problem Relation Age of Onset    Diabetes Mother     Heart disease Mother       Past Surgical History:   Procedure Laterality Date    BACK SURGERY      COLONOSCOPY      TUBAL ABDOMINAL LIGATION          Current Outpatient Medications:     albuterol sulfate  (90 Base) MCG/ACT inhaler, 2 puffs  every 4-6 hours as needed for shortness of air, Disp: 18 g, Rfl: 1    aspirin 81 MG EC tablet, Take 1 tablet by mouth Every Other Day., Disp: 90  "tablet, Rfl: 2    atorvastatin (LIPITOR) 40 MG tablet, Take 1 tablet by mouth Daily., Disp: 90 tablet, Rfl: 3    Cholecalciferol (Vitamin D3) 50 MCG (2000 UT) tablet, Take 1 tablet by mouth Every Other Day., Disp: 30 tablet, Rfl: 2    ibuprofen (ADVIL,MOTRIN) 800 MG tablet, Take 1 tablet by mouth Every 6 (Six) Hours As Needed for Mild Pain., Disp: , Rfl:     levothyroxine (SYNTHROID, LEVOTHROID) 25 MCG tablet, Take 1 tablet by mouth Daily., Disp: 90 tablet, Rfl: 3    lisinopril-hydrochlorothiazide (PRINZIDE,ZESTORETIC) 20-12.5 MG per tablet, Take 1 tablet by mouth Daily., Disp: 90 tablet, Rfl: 3    omeprazole (priLOSEC) 40 MG capsule, Take 1 capsule by mouth Daily., Disp: 90 capsule, Rfl: 3    PARoxetine (PAXIL) 40 MG tablet, TAKE 1 TABLET BY MOUTH ONCE DAILY IN THE MORNING, Disp: 90 tablet, Rfl: 3    amoxicillin-clavulanate (AUGMENTIN) 875-125 MG per tablet, Take 1 tablet by mouth 2 (Two) Times a Day for 7 days. Take with food., Disp: 14 tablet, Rfl: 0    fluconazole (Diflucan) 150 MG tablet, Take 1 tablet by mouth 1 (One) Time for 1 dose., Disp: 1 tablet, Rfl: 0    predniSONE (DELTASONE) 20 MG tablet, Take 1 tablet by mouth 2 (Two) Times a Day for 5 days., Disp: 10 tablet, Rfl: 0    OBJECTIVE  Vital Signs:   /78   Pulse 76   Ht 165.1 cm (65\")   Wt 88.4 kg (194 lb 12.8 oz)   SpO2 98%   BMI 32.42 kg/m²    Estimated body mass index is 32.42 kg/m² as calculated from the following:    Height as of this encounter: 165.1 cm (65\").    Weight as of this encounter: 88.4 kg (194 lb 12.8 oz).     Wt Readings from Last 3 Encounters:   04/25/25 88.4 kg (194 lb 12.8 oz)   03/27/25 88.5 kg (195 lb)   03/18/25 89.8 kg (198 lb)     BP Readings from Last 3 Encounters:   04/25/25 110/78   03/27/25 114/64   03/18/25 120/83       Physical Exam  Vitals and nursing note reviewed.   Constitutional:       Appearance: Normal appearance.   HENT:      Head: Normocephalic.      Right Ear: A middle ear effusion is present. Tympanic " membrane is erythematous.      Left Ear: A middle ear effusion is present.      Nose: Congestion present.      Right Sinus: Maxillary sinus tenderness and frontal sinus tenderness present.      Left Sinus: Maxillary sinus tenderness and frontal sinus tenderness present.   Eyes:      Extraocular Movements: Extraocular movements intact.      Conjunctiva/sclera: Conjunctivae normal.   Cardiovascular:      Rate and Rhythm: Normal rate and regular rhythm.      Heart sounds: Normal heart sounds. No murmur heard.  Pulmonary:      Effort: Pulmonary effort is normal.      Breath sounds: Normal breath sounds. No wheezing or rales.   Musculoskeletal:         General: No swelling. Normal range of motion.   Skin:     General: Skin is warm and dry.   Neurological:      General: No focal deficit present.      Mental Status: She is alert. Mental status is at baseline.   Psychiatric:         Mood and Affect: Mood normal.         Behavior: Behavior normal.         Thought Content: Thought content normal.         Judgment: Judgment normal.          Result Review        MRI Cervical Spine Without Contrast  Result Date: 3/11/2025  Impression: Cervical spondylosis is present, somewhat focally notable at the C5-6 level where a prominent disc osteophyte complex and some facet arthropathy resulting mild spinal canal narrowing and moderate left neuroforaminal stenosis. Electronically Signed: Jaziel Song MD  3/11/2025 9:26 PM EDT  Workstation ID: [id number]    MRI Shoulder Left Without Contrast  Result Date: 3/10/2025  Impression: 1.There is a full-thickness tear involving the supraspinatus component of the rotator cuff. 2.No evidence of biceps tendon tear. 3.Evidence for a posterior labral tear. These findings may indicate changes of chronic posterior impingement. 4.Findings suggesting potential changes of adhesive capsulitis. 5.Mild degenerative changes of the shoulder are noted. Electronically Signed: John Solitario MD  3/10/2025 11:31  MONE EDT  Workstation ID: [id number]       The above data has been reviewed by ANA LAURA Andersen 04/25/2025 10:05 EDT.          Patient Care Team:  Jaspal Gutierrez MD as PCP - General (Internal Medicine)            ASSESSMENT & PLAN    Diagnoses and all orders for this visit:    1. Acute non-recurrent pansinusitis (Primary)  -     amoxicillin-clavulanate (AUGMENTIN) 875-125 MG per tablet; Take 1 tablet by mouth 2 (Two) Times a Day for 7 days. Take with food.  Dispense: 14 tablet; Refill: 0  -     predniSONE (DELTASONE) 20 MG tablet; Take 1 tablet by mouth 2 (Two) Times a Day for 5 days.  Dispense: 10 tablet; Refill: 0  -     fluconazole (Diflucan) 150 MG tablet; Take 1 tablet by mouth 1 (One) Time for 1 dose.  Dispense: 1 tablet; Refill: 0         Assessment & Plan  1. Sinus infection.  - Reports a sinus headache, tightness under the eyes, and ear popping, with fluid noted in the right ear upon examination.  - Declines covid/flu testing.  - Similar symptoms experienced with previous sinus infections.  - A prescription for Augmentin and prednisone will be sent to [facility].  - Diflucan prescribed as a precautionary measure against potential yeast infection, which has occurred with certain antibiotics in the past. If condition does not improve or additional medication is required, advised to contact the office next week.      Tobacco Use: Medium Risk (4/25/2025)    Patient History     Smoking Tobacco Use: Former     Smokeless Tobacco Use: Never     Passive Exposure: Past       Follow Up     Return if symptoms worsen or fail to improve.    Please note that portions of this note were completed with a voice recognition program.    Patient was given instructions and counseling regarding her condition or for health maintenance advice. Please see specific information pulled into the AVS if appropriate.   I have reviewed information obtained and documented by others and I have confirmed the accuracy of this documented  note.    ANA LAURA Andersen    Patient or patient representative verbalized consent for the use of Ambient Listening during the visit with  ANA LAURA Andersen for chart documentation. 4/25/2025  10:13 EDT

## 2025-04-28 ENCOUNTER — HOSPITAL ENCOUNTER (OUTPATIENT)
Facility: HOSPITAL | Age: 60
Discharge: HOME | End: 2025-04-28
Payer: COMMERCIAL

## 2025-04-28 DIAGNOSIS — R06.09 DOE (DYSPNEA ON EXERTION): ICD-10-CM

## 2025-04-28 PROCEDURE — 25010000002 REGADENOSON 0.4 MG/5ML SOLUTION: Performed by: INTERNAL MEDICINE

## 2025-04-28 PROCEDURE — 34310000005 TECHNETIUM TETROFOSMIN KIT: Performed by: INTERNAL MEDICINE

## 2025-04-28 PROCEDURE — 93017 CV STRESS TEST TRACING ONLY: CPT

## 2025-04-28 PROCEDURE — A9502 TC99M TETROFOSMIN: HCPCS | Performed by: INTERNAL MEDICINE

## 2025-04-28 PROCEDURE — 78452 HT MUSCLE IMAGE SPECT MULT: CPT

## 2025-04-28 PROCEDURE — 93306 TTE W/DOPPLER COMPLETE: CPT

## 2025-04-28 RX ORDER — REGADENOSON 0.08 MG/ML
0.4 INJECTION, SOLUTION INTRAVENOUS
Status: COMPLETED | OUTPATIENT
Start: 2025-04-28 | End: 2025-04-28

## 2025-04-28 RX ADMIN — TETROFOSMIN 1 DOSE: 1.38 INJECTION, POWDER, LYOPHILIZED, FOR SOLUTION INTRAVENOUS at 12:40

## 2025-04-28 RX ADMIN — TETROFOSMIN 1 DOSE: 1.38 INJECTION, POWDER, LYOPHILIZED, FOR SOLUTION INTRAVENOUS at 11:27

## 2025-04-28 RX ADMIN — REGADENOSON 0.4 MG: 0.08 INJECTION, SOLUTION INTRAVENOUS at 12:40

## 2025-05-02 ENCOUNTER — PREP FOR SURGERY (OUTPATIENT)
Dept: OTHER | Facility: HOSPITAL | Age: 60
End: 2025-05-02
Payer: COMMERCIAL

## 2025-05-02 ENCOUNTER — HOSPITAL ENCOUNTER (OUTPATIENT)
Facility: HOSPITAL | Age: 60
End: 2025-05-02
Attending: INTERNAL MEDICINE | Admitting: INTERNAL MEDICINE
Payer: COMMERCIAL

## 2025-05-02 ENCOUNTER — TELEPHONE (OUTPATIENT)
Dept: CARDIOLOGY | Facility: CLINIC | Age: 60
End: 2025-05-02
Payer: COMMERCIAL

## 2025-05-02 DIAGNOSIS — I20.9 ANGINA PECTORIS: ICD-10-CM

## 2025-05-02 DIAGNOSIS — I20.9 ANGINA PECTORIS: Primary | ICD-10-CM

## 2025-05-02 LAB
AORTIC DIMENSIONLESS INDEX: 0.82 (DI)
ASCENDING AORTA: 3 CM
AV MEAN PRESS GRAD SYS DOP V1V2: 3.4 MMHG
AV VMAX SYS DOP: 129 CM/SEC
BH CV ECHO MEAS - AO MAX PG: 6.7 MMHG
BH CV ECHO MEAS - AO ROOT DIAM: 3.3 CM
BH CV ECHO MEAS - AO V2 VTI: 25.4 CM
BH CV ECHO MEAS - AVA(I,D): 2.6 CM2
BH CV ECHO MEAS - EDV(CUBED): 67.4 ML
BH CV ECHO MEAS - EDV(MOD-SP2): 69 ML
BH CV ECHO MEAS - EDV(MOD-SP4): 71.7 ML
BH CV ECHO MEAS - EF(MOD-SP2): 60.3 %
BH CV ECHO MEAS - EF(MOD-SP4): 63.9 %
BH CV ECHO MEAS - ESV(CUBED): 19.3 ML
BH CV ECHO MEAS - ESV(MOD-SP2): 27.4 ML
BH CV ECHO MEAS - ESV(MOD-SP4): 25.9 ML
BH CV ECHO MEAS - FS: 34.1 %
BH CV ECHO MEAS - IVS/LVPW: 1.15 CM
BH CV ECHO MEAS - IVSD: 1.13 CM
BH CV ECHO MEAS - LA DIMENSION: 3.5 CM
BH CV ECHO MEAS - LAT PEAK E' VEL: 12 CM/SEC
BH CV ECHO MEAS - LV DIASTOLIC VOL/BSA (35-75): 36.7 CM2
BH CV ECHO MEAS - LV MASS(C)D: 140.6 GRAMS
BH CV ECHO MEAS - LV MAX PG: 5.3 MMHG
BH CV ECHO MEAS - LV MEAN PG: 2.41 MMHG
BH CV ECHO MEAS - LV SYSTOLIC VOL/BSA (12-30): 13.3 CM2
BH CV ECHO MEAS - LV V1 MAX: 115.3 CM/SEC
BH CV ECHO MEAS - LV V1 VTI: 20.8 CM
BH CV ECHO MEAS - LVIDD: 4.1 CM
BH CV ECHO MEAS - LVIDS: 2.7 CM
BH CV ECHO MEAS - LVOT AREA: 3.2 CM2
BH CV ECHO MEAS - LVOT DIAM: 2.02 CM
BH CV ECHO MEAS - LVPWD: 0.98 CM
BH CV ECHO MEAS - MED PEAK E' VEL: 6.7 CM/SEC
BH CV ECHO MEAS - MV A MAX VEL: 69.4 CM/SEC
BH CV ECHO MEAS - MV DEC SLOPE: 332.6 CM/SEC2
BH CV ECHO MEAS - MV DEC TIME: 0.21 SEC
BH CV ECHO MEAS - MV E MAX VEL: 70.3 CM/SEC
BH CV ECHO MEAS - MV E/A: 1.01
BH CV ECHO MEAS - MV MAX PG: 4 MMHG
BH CV ECHO MEAS - MV MEAN PG: 1.82 MMHG
BH CV ECHO MEAS - MV V2 VTI: 29.3 CM
BH CV ECHO MEAS - MVA(VTI): 2.28 CM2
BH CV ECHO MEAS - RVDD: 3.7 CM
BH CV ECHO MEAS - SV(LVOT): 67 ML
BH CV ECHO MEAS - SV(MOD-SP2): 41.6 ML
BH CV ECHO MEAS - SV(MOD-SP4): 45.8 ML
BH CV ECHO MEAS - SVI(LVOT): 34.3 ML/M2
BH CV ECHO MEAS - SVI(MOD-SP2): 21.3 ML/M2
BH CV ECHO MEAS - SVI(MOD-SP4): 23.5 ML/M2
BH CV ECHO MEAS - TAPSE (>1.6): 2.03 CM
BH CV ECHO MEASUREMENTS AVERAGE E/E' RATIO: 7.52
BH CV XLRA - TDI S': 9.7 CM/SEC
IVRT: 61 MS
LEFT ATRIUM VOLUME INDEX: 20.6 ML/M2
LV EF BIPLANE MOD: 63.3 %

## 2025-05-02 RX ORDER — SODIUM CHLORIDE 9 MG/ML
40 INJECTION, SOLUTION INTRAVENOUS AS NEEDED
OUTPATIENT
Start: 2025-05-02

## 2025-05-02 RX ORDER — SODIUM CHLORIDE 0.9 % (FLUSH) 0.9 %
10 SYRINGE (ML) INJECTION EVERY 12 HOURS SCHEDULED
OUTPATIENT
Start: 2025-05-02

## 2025-05-02 RX ORDER — SODIUM CHLORIDE 0.9 % (FLUSH) 0.9 %
10 SYRINGE (ML) INJECTION AS NEEDED
OUTPATIENT
Start: 2025-05-02

## 2025-05-02 NOTE — TELEPHONE ENCOUNTER
Called patient discussed results of her stress test showing mild anterior apical defect this could be artifactual in nature as she does have some GI uptake issues however she has had some intermittent chest discomfort symptoms recommended proceeding either with heart catheterization or CT angiogram to further define discussed both options agreed upon heart catheterization discussed risk of bleeding, kidney function death, heart attack, and stroke and patient wish to proceed.  Continue with her aspirin 81 every other day for now

## 2025-05-04 LAB
BH CV IMMEDIATE POST RECOVERY TECH DATA SYMPTOMS: NORMAL
BH CV IMMEDIATE POST TECH DATA BLOOD PRESSURE: NORMAL MMHG
BH CV IMMEDIATE POST TECH DATA HEART RATE: 96 BPM
BH CV IMMEDIATE POST TECH DATA OXYGEN SATS: 93 %
BH CV REST NUCLEAR ISOTOPE DOSE: 9 MCI
BH CV SIX MINUTE RECOVERY TECH DATA BLOOD PRESSURE: NORMAL
BH CV SIX MINUTE RECOVERY TECH DATA HEART RATE: 79 BPM
BH CV SIX MINUTE RECOVERY TECH DATA OXYGEN SATURATION: 95 %
BH CV SIX MINUTE RECOVERY TECH DATA SYMPTOMS: NORMAL
BH CV STRESS BP STAGE 1: NORMAL
BH CV STRESS BP STAGE 2: NORMAL
BH CV STRESS COMMENTS STAGE 1: NORMAL
BH CV STRESS COMMENTS STAGE 2: NORMAL
BH CV STRESS DOSE REGADENOSON STAGE 1: 0.4
BH CV STRESS DURATION MIN STAGE 1: 0
BH CV STRESS DURATION MIN STAGE 2: 4
BH CV STRESS DURATION SEC STAGE 1: 10
BH CV STRESS DURATION SEC STAGE 2: 0
BH CV STRESS GRADE STAGE 1: 10
BH CV STRESS HR STAGE 1: 84
BH CV STRESS HR STAGE 2: 102
BH CV STRESS METS STAGE 1: 5
BH CV STRESS NUCLEAR ISOTOPE DOSE: 35 MCI
BH CV STRESS O2 STAGE 1: 94
BH CV STRESS O2 STAGE 2: 91
BH CV STRESS PROTOCOL 1: NORMAL
BH CV STRESS RECOVERY BP: NORMAL MMHG
BH CV STRESS RECOVERY HR: 79 BPM
BH CV STRESS RECOVERY O2: 95 %
BH CV STRESS SPEED STAGE 1: 1.7
BH CV STRESS STAGE 1: 1
BH CV STRESS STAGE 2: 2
BH CV THREE MINUTE POST TECH DATA BLOOD PRESSURE: NORMAL MMHG
BH CV THREE MINUTE POST TECH DATA HEART RATE: 82 BPM
BH CV THREE MINUTE POST TECH DATA OXYGEN SATURATION: 94 %
MAXIMAL PREDICTED HEART RATE: 160 BPM
PERCENT MAX PREDICTED HR: 90.63 %
SPECT HRT GATED+EF W RNC IV: 59 %
STRESS BASELINE BP: NORMAL MMHG
STRESS BASELINE HR: 74 BPM
STRESS O2 SAT REST: 93 %
STRESS PERCENT HR: 107 %
STRESS POST O2 SAT PEAK: 91 %
STRESS POST PEAK BP: NORMAL MMHG
STRESS POST PEAK HR: 145 BPM
STRESS TARGET HR: 136 BPM

## 2025-05-07 ENCOUNTER — CLINICAL SUPPORT (OUTPATIENT)
Dept: INTERNAL MEDICINE | Age: 60
End: 2025-05-07
Payer: COMMERCIAL

## 2025-05-07 DIAGNOSIS — M18.0 ARTHRITIS OF CARPOMETACARPAL (CMC) JOINT OF BOTH THUMBS: ICD-10-CM

## 2025-05-07 DIAGNOSIS — E06.3 HYPOTHYROIDISM DUE TO HASHIMOTO'S THYROIDITIS: ICD-10-CM

## 2025-05-07 DIAGNOSIS — E78.2 MIXED HYPERLIPIDEMIA: ICD-10-CM

## 2025-05-07 DIAGNOSIS — M75.52 BILATERAL SHOULDER BURSITIS: ICD-10-CM

## 2025-05-07 DIAGNOSIS — R52 PAIN: ICD-10-CM

## 2025-05-07 DIAGNOSIS — I10 PRIMARY HYPERTENSION: ICD-10-CM

## 2025-05-07 DIAGNOSIS — M12.812 ROTATOR CUFF ARTHROPATHY OF LEFT SHOULDER: ICD-10-CM

## 2025-05-07 DIAGNOSIS — M75.51 BILATERAL SHOULDER BURSITIS: ICD-10-CM

## 2025-05-07 DIAGNOSIS — M54.2 NECK PAIN, BILATERAL: ICD-10-CM

## 2025-05-07 DIAGNOSIS — E53.8 VITAMIN B12 DEFICIENCY: ICD-10-CM

## 2025-05-07 DIAGNOSIS — R73.01 IFG (IMPAIRED FASTING GLUCOSE): ICD-10-CM

## 2025-05-07 DIAGNOSIS — M19.041 PRIMARY OSTEOARTHRITIS OF BOTH HANDS: ICD-10-CM

## 2025-05-07 DIAGNOSIS — M54.12 CERVICAL RADICULOPATHY: ICD-10-CM

## 2025-05-07 DIAGNOSIS — F32.89 OTHER DEPRESSION: ICD-10-CM

## 2025-05-07 DIAGNOSIS — M19.042 PRIMARY OSTEOARTHRITIS OF BOTH HANDS: ICD-10-CM

## 2025-05-07 LAB
ALBUMIN SERPL-MCNC: 4.3 G/DL (ref 3.5–5.2)
ALBUMIN/GLOB SERPL: 1.9 G/DL
ALP SERPL-CCNC: 107 U/L (ref 39–117)
ALT SERPL W P-5'-P-CCNC: 20 U/L (ref 1–33)
ANION GAP SERPL CALCULATED.3IONS-SCNC: 12.2 MMOL/L (ref 5–15)
APTT PPP: 26.2 SECONDS (ref 24.2–34.2)
AST SERPL-CCNC: 26 U/L (ref 1–32)
BASOPHILS # BLD AUTO: 0.04 10*3/MM3 (ref 0–0.2)
BASOPHILS NFR BLD AUTO: 0.8 % (ref 0–1.5)
BILIRUB SERPL-MCNC: 0.3 MG/DL (ref 0–1.2)
BUN SERPL-MCNC: 13 MG/DL (ref 8–23)
BUN/CREAT SERPL: 16.9 (ref 7–25)
CALCIUM SPEC-SCNC: 9.4 MG/DL (ref 8.6–10.5)
CHLORIDE SERPL-SCNC: 101 MMOL/L (ref 98–107)
CHOLEST SERPL-MCNC: 190 MG/DL (ref 0–200)
CO2 SERPL-SCNC: 24.8 MMOL/L (ref 22–29)
CREAT SERPL-MCNC: 0.77 MG/DL (ref 0.57–1)
DEPRECATED RDW RBC AUTO: 45.8 FL (ref 37–54)
EGFRCR SERPLBLD CKD-EPI 2021: 88.4 ML/MIN/1.73
EOSINOPHIL # BLD AUTO: 0.08 10*3/MM3 (ref 0–0.4)
EOSINOPHIL NFR BLD AUTO: 1.6 % (ref 0.3–6.2)
ERYTHROCYTE [DISTWIDTH] IN BLOOD BY AUTOMATED COUNT: 13 % (ref 12.3–15.4)
ERYTHROCYTE [SEDIMENTATION RATE] IN BLOOD: 8 MM/HR (ref 0–30)
GLOBULIN UR ELPH-MCNC: 2.3 GM/DL
GLUCOSE SERPL-MCNC: 98 MG/DL (ref 65–99)
HBA1C MFR BLD: 6.4 % (ref 4.8–5.6)
HCT VFR BLD AUTO: 39.4 % (ref 34–46.6)
HDLC SERPL-MCNC: 62 MG/DL (ref 40–60)
HGB BLD-MCNC: 12.9 G/DL (ref 12–15.9)
IMM GRANULOCYTES # BLD AUTO: 0.01 10*3/MM3 (ref 0–0.05)
IMM GRANULOCYTES NFR BLD AUTO: 0.2 % (ref 0–0.5)
INR PPP: 0.9 (ref 0.86–1.15)
LDLC SERPL CALC-MCNC: 108 MG/DL (ref 0–100)
LDLC/HDLC SERPL: 1.71 {RATIO}
LYMPHOCYTES # BLD AUTO: 1.78 10*3/MM3 (ref 0.7–3.1)
LYMPHOCYTES NFR BLD AUTO: 35.6 % (ref 19.6–45.3)
MCH RBC QN AUTO: 31.5 PG (ref 26.6–33)
MCHC RBC AUTO-ENTMCNC: 32.7 G/DL (ref 31.5–35.7)
MCV RBC AUTO: 96.3 FL (ref 79–97)
MONOCYTES # BLD AUTO: 0.51 10*3/MM3 (ref 0.1–0.9)
MONOCYTES NFR BLD AUTO: 10.2 % (ref 5–12)
NEUTROPHILS NFR BLD AUTO: 2.58 10*3/MM3 (ref 1.7–7)
NEUTROPHILS NFR BLD AUTO: 51.6 % (ref 42.7–76)
NRBC BLD AUTO-RTO: 0 /100 WBC (ref 0–0.2)
PLATELET # BLD AUTO: 262 10*3/MM3 (ref 140–450)
PMV BLD AUTO: 10 FL (ref 6–12)
POTASSIUM SERPL-SCNC: 4.7 MMOL/L (ref 3.5–5.2)
PROT SERPL-MCNC: 6.6 G/DL (ref 6–8.5)
PROTHROMBIN TIME: 12.5 SECONDS (ref 11.8–14.9)
RBC # BLD AUTO: 4.09 10*6/MM3 (ref 3.77–5.28)
SODIUM SERPL-SCNC: 138 MMOL/L (ref 136–145)
T4 FREE SERPL-MCNC: 1.02 NG/DL (ref 0.92–1.68)
TRIGL SERPL-MCNC: 111 MG/DL (ref 0–150)
TSH SERPL DL<=0.05 MIU/L-ACNC: 3.2 UIU/ML (ref 0.27–4.2)
VLDLC SERPL-MCNC: 20 MG/DL (ref 5–40)
WBC NRBC COR # BLD AUTO: 5 10*3/MM3 (ref 3.4–10.8)

## 2025-05-07 PROCEDURE — 85730 THROMBOPLASTIN TIME PARTIAL: CPT | Performed by: INTERNAL MEDICINE

## 2025-05-07 PROCEDURE — 36415 COLL VENOUS BLD VENIPUNCTURE: CPT | Performed by: INTERNAL MEDICINE

## 2025-05-07 PROCEDURE — 84439 ASSAY OF FREE THYROXINE: CPT | Performed by: INTERNAL MEDICINE

## 2025-05-07 PROCEDURE — 36415 COLL VENOUS BLD VENIPUNCTURE: CPT

## 2025-05-07 PROCEDURE — 85610 PROTHROMBIN TIME: CPT | Performed by: INTERNAL MEDICINE

## 2025-05-07 PROCEDURE — 85652 RBC SED RATE AUTOMATED: CPT | Performed by: INTERNAL MEDICINE

## 2025-05-07 PROCEDURE — 80050 GENERAL HEALTH PANEL: CPT | Performed by: INTERNAL MEDICINE

## 2025-05-07 PROCEDURE — 83036 HEMOGLOBIN GLYCOSYLATED A1C: CPT | Performed by: INTERNAL MEDICINE

## 2025-05-07 PROCEDURE — 80061 LIPID PANEL: CPT | Performed by: INTERNAL MEDICINE

## 2025-05-13 ENCOUNTER — TELEPHONE (OUTPATIENT)
Dept: CARDIOLOGY | Facility: CLINIC | Age: 60
End: 2025-05-13
Payer: COMMERCIAL

## 2025-05-13 ENCOUNTER — TELEPHONE (OUTPATIENT)
Dept: ORTHOPEDIC SURGERY | Facility: CLINIC | Age: 60
End: 2025-05-13
Payer: COMMERCIAL

## 2025-05-13 RX ORDER — ISOSORBIDE MONONITRATE 30 MG/1
30 TABLET, EXTENDED RELEASE ORAL DAILY
Qty: 30 TABLET | Refills: 0 | Status: SHIPPED | OUTPATIENT
Start: 2025-05-13

## 2025-05-13 NOTE — TELEPHONE ENCOUNTER
Received the following from Amy DU:  insurance denied because she has not been prescribed antianginal therapy, go ahead and send in 30 day Rx for isosorbide 30 mg daily. have her follow up with dr galindo in 30 days to reassess symptoms     Patient notified.  Patient frustrated as she was to be having surgery with Dr Gonzalez. Advised we cannot clear at this time. Advised would have to try Imdur for 30 days and follow up with Dr Galindo to re-evaluate or could do CTA as insurance would approve that. Patient decided continue with the Imdur and follow up with Dr Galindo to re-evaluate

## 2025-05-13 NOTE — TELEPHONE ENCOUNTER
PATIENT CALLED IN AND STATES DR. LUBIN WILL NOT CLEAR PATIENT FOR SURGERY AT THIS TIME. PATIENT WAS SCHEDULED FOR A HEART CATH TODAY 5/13/2025, BUT WAS CANCELLED DUE TO INSURANCE DENIAL.     PATIENT WAS INFORMED THAT WE WILL HAVE TO CX SX FOR 5/19/2025 UNTIL SHE HAS BEEN CARDIAC CLEARED. PATIENT VOICED UNDERSTANDING.     PATIENT REQUESTED A F/U APPT W/DR. PALAFOX TO DISCUSS POSSIBLE INJECTIONS IN BILATERAL SHOULDERS UNTIL SHE IS ABLE TO BE CLEARED BY CARDIOLOGY. F/U APPT SCHEDULED FOR 5/22 W/.     SPOKE WITH DARYL IN SCHEDULING TO CX SX FOR 5/19/2025.

## 2025-05-16 ENCOUNTER — TELEPHONE (OUTPATIENT)
Dept: CARDIOLOGY | Facility: CLINIC | Age: 60
End: 2025-05-16
Payer: COMMERCIAL

## 2025-05-16 NOTE — TELEPHONE ENCOUNTER
Patient called in to ask why Dr. Stanford did not prescribe her new medication. I explained how the office flow works and patient has requested to only see Dr. Stanford and she only wants him to prescriber her medications. I advised patient she may have longer wait times due to this and she was agreeable.

## 2025-05-20 ENCOUNTER — TELEPHONE (OUTPATIENT)
Dept: CARDIOLOGY | Facility: CLINIC | Age: 60
End: 2025-05-20
Payer: COMMERCIAL

## 2025-05-20 RX ORDER — RANOLAZINE 500 MG/1
500 TABLET, EXTENDED RELEASE ORAL 2 TIMES DAILY
Qty: 60 TABLET | Refills: 0 | Status: SHIPPED | OUTPATIENT
Start: 2025-05-20 | End: 2025-06-19

## 2025-05-20 NOTE — TELEPHONE ENCOUNTER
Patient called stating that since she has been taking the Isosorbide Mono, she has had a consistent headache and cannot sleep. She stated she cannot continue to take this medication as she is a  and is exhausted with a headache.   Please advise.

## 2025-05-22 ENCOUNTER — OFFICE VISIT (OUTPATIENT)
Dept: ORTHOPEDIC SURGERY | Facility: CLINIC | Age: 60
End: 2025-05-22
Payer: COMMERCIAL

## 2025-05-22 VITALS — HEIGHT: 65 IN | BODY MASS INDEX: 32.32 KG/M2 | WEIGHT: 194 LBS

## 2025-05-22 DIAGNOSIS — S43.432A TEAR OF LEFT GLENOID LABRUM, INITIAL ENCOUNTER: ICD-10-CM

## 2025-05-22 DIAGNOSIS — M75.122 NONTRAUMATIC COMPLETE TEAR OF LEFT ROTATOR CUFF: Primary | ICD-10-CM

## 2025-05-22 DIAGNOSIS — M25.819 SHOULDER IMPINGEMENT: ICD-10-CM

## 2025-05-22 RX ORDER — TRIAMCINOLONE ACETONIDE 40 MG/ML
40 INJECTION, SUSPENSION INTRA-ARTICULAR; INTRAMUSCULAR
Status: COMPLETED | OUTPATIENT
Start: 2025-05-22 | End: 2025-05-22

## 2025-05-22 RX ORDER — LIDOCAINE HYDROCHLORIDE 10 MG/ML
5 INJECTION, SOLUTION INFILTRATION; PERINEURAL
Status: COMPLETED | OUTPATIENT
Start: 2025-05-22 | End: 2025-05-22

## 2025-05-22 RX ADMIN — LIDOCAINE HYDROCHLORIDE 5 ML: 10 INJECTION, SOLUTION INFILTRATION; PERINEURAL at 09:19

## 2025-05-22 RX ADMIN — TRIAMCINOLONE ACETONIDE 40 MG: 40 INJECTION, SUSPENSION INTRA-ARTICULAR; INTRAMUSCULAR at 09:19

## 2025-05-22 RX ADMIN — TRIAMCINOLONE ACETONIDE 40 MG: 40 INJECTION, SUSPENSION INTRA-ARTICULAR; INTRAMUSCULAR at 09:21

## 2025-05-22 RX ADMIN — LIDOCAINE HYDROCHLORIDE 5 ML: 10 INJECTION, SOLUTION INFILTRATION; PERINEURAL at 09:21

## 2025-05-22 NOTE — PROGRESS NOTES
"Chief Complaint  Follow-up and Pain of the Left Shoulder and Follow-up and Pain of the Right Shoulder       Subjective      Gualberto Gloria presents to [facility] for a follow up for bilateral shoulder. She has been treating her bilateral shoulder rotator cuff tears conservatively. She was last seen in the office on 25 where she had bilateral shoulder steroid injections. She states these injections gave her some relief. She states her right shoulder is worse than her left shoulder. She was scheduled surgery on her left shoulder, however, had to cancel due to her cardiologist. She denies any new injury or falls since her last visit.     Allergies   Allergen Reactions    Dexamethasone Rash     Face turns red and hot    Pt states she can take  a low dose and will be ok         Social History     Socioeconomic History    Marital status:    Tobacco Use    Smoking status: Former     Current packs/day: 0.00     Average packs/day: 1 pack/day for 37.0 years (37.0 ttl pk-yrs)     Types: Cigarettes     Start date: 1980     Quit date: 2017     Years since quittin.4     Passive exposure: Past    Smokeless tobacco: Never   Vaping Use    Vaping status: Never Used   Substance and Sexual Activity    Alcohol use: Not Currently    Drug use: Never    Sexual activity: Defer        I reviewed the patient's chief complaint, history of present illness, review of systems, past medical history, surgical history, family history, social history, medications, and allergy list.     Review of Systems     Constitutional: Denies fevers, chills, weight loss  Cardiovascular: Denies chest pain, shortness of breath  Skin: Denies rashes, acute skin changes  Neurologic: Denies headache, loss of consciousness  MSK: bilateral shoulder pain       Vital Signs:   Ht 165.1 cm (65\")   Wt 88 kg (194 lb)   BMI 32.28 kg/m²            Ortho Exam    Physical Exam  General:Alert. No acute distress   Right " upper extremity; non tender, forward elevation to 175 degrees, abduction to 155 degrees, external rotation to 85 degrees, internal rotation to 75, 5/5 rotator cuff strength, internal rotation to L3, mild pain with impingement, mild pain with impingement, mild pain with cross arm, neurovascularly intact, sensation intact to the medial, radial and ulnar nerve.     Left upper extremity: forward elevation to 175 degrees, abduction to 155 degrees, external rotation to 80 degrees, internal rotation to 75 degrees, positive impingement, 5/5 rotator cuff strength, internal rotation to L3, mild pain with cross arm, neurovascularly intact, mildly limited cervical range of motion, sensation intact to the medial, radial and ulnar nerve     RIGHT SHOULDER INJECTION  Date/Time: 5/22/2025 9:19 AM  Consent given by: patient  Site marked: site marked  Timeout: Immediately prior to procedure a time out was called to verify the correct patient, procedure, equipment, support staff and site/side marked as required   Supporting Documentation  Indications: pain   Procedure Details  Location: shoulder -   Needle gauge: 21G.  Medications administered: 5 mL lidocaine 1 %; 40 mg triamcinolone acetonide 40 MG/ML  Patient tolerance: patient tolerated the procedure well with no immediate complications      LEFT SHOULDER INJECTION  Date/Time: 5/22/2025 9:21 AM  Consent given by: patient  Site marked: site marked  Timeout: Immediately prior to procedure a time out was called to verify the correct patient, procedure, equipment, support staff and site/side marked as required   Supporting Documentation  Indications: pain   Procedure Details  Location: shoulder -   Needle gauge: 21G.  Medications administered: 5 mL lidocaine 1 %; 40 mg triamcinolone acetonide 40 MG/ML  Patient tolerance: patient tolerated the procedure well with no immediate complications    This injection documentation was Scribed for Geovanni Gonzalez MD by Lore Erazo  MA.  05/22/25   09:21 EDT    Imaging Results (Most Recent)       None             Result Review :       Stress Test With Myocardial Perfusion One Day  Result Date: 5/4/2025  Narrative:   Left ventricular ejection fraction is normal (Calculated EF = 59%).   GI artifact is present.   Findings consistent with a normal ECG stress test.   Mild area of decreased perfusion in the anterior apical myocardium small in size on the rest images mild increase on stress     Adult Transthoracic Echo Complete W/ Cont if Necessary Per Protocol  Result Date: 5/2/2025  Narrative:   Left ventricular systolic function is hyperdynamic (EF > 70%).   Left ventricular diastolic function was normal.   The study is technically difficult for diagnosis.              Assessment and Plan     Diagnoses and all orders for this visit:    1. Nontraumatic complete tear of left rotator cuff (Primary)    2. Tear of left glenoid labrum, initial encounter    3. Shoulder impingement        The patient presents here today for a follow up for bilateral shoulder.     Discussed operative treatment options regarding a left shoulder  arthroscopy with rotator cuff repair, possible labral debridement, possible biceps tendonesis versus tendotomy, subacromial decompression versus non operative treatment options regarding injections, oral medications and therapy.      Patient wishes to proceed with conservative treatment options at this time.     Discussed the risks and benefits of bilateral shoulder steroid injections today in the office. Patient expressed understanding and wishes to proceed. Patient tolerted the injection well and without any complications.     Home exercises given today and will continue current medications for pain control.     Call or return if worsening symptoms.    Follow Up     6 - 8 weeks     Patient was given instructions and counseling regarding her condition or for health maintenance advice. Please see specific information pulled into the  AVS if appropriate.     Scribed for Geovanni Gonzalez MD by Tamica Mccauley.  05/22/25   08:58 EDT    I have personally performed the services described in this document as scribed by the above individual and it is both accurate and complete. Geovanni Gonzalez MD 05/22/25

## 2025-05-27 ENCOUNTER — TELEPHONE (OUTPATIENT)
Dept: CARDIOLOGY | Facility: CLINIC | Age: 60
End: 2025-05-27
Payer: COMMERCIAL

## 2025-05-27 DIAGNOSIS — R30.9 URINATION PAIN: Primary | ICD-10-CM

## 2025-05-27 NOTE — TELEPHONE ENCOUNTER
Caller: Gualberto Gloria    Relationship: Self    Best call back number: [phone]    Who are you requesting to speak with (clinical staff, provider,  specific staff member): NURSE     What was the call regarding: PT CALLED IN STATING SHE IS QUITTING HER RANOLAZINE DUE TO IT CAUSING HER EXTREME CONSTIPATION AND NOW A UTI. SHE IS ASKING FOR AN ANTIBIOTIC FOR THE UTI, AMOXICILLIN SINCE OTHER ANTIBIOTICS GIVE HER A UTI.     Is it okay if the provider responds through MyChart: CALL BACK TO DISCUSS

## 2025-06-03 ENCOUNTER — PREP FOR SURGERY (OUTPATIENT)
Dept: OTHER | Facility: HOSPITAL | Age: 60
End: 2025-06-03
Payer: COMMERCIAL

## 2025-06-03 ENCOUNTER — TELEPHONE (OUTPATIENT)
Dept: CARDIOLOGY | Facility: CLINIC | Age: 60
End: 2025-06-03
Payer: COMMERCIAL

## 2025-06-03 DIAGNOSIS — I20.9 ANGINA PECTORIS: ICD-10-CM

## 2025-06-03 DIAGNOSIS — I25.10 CORONARY ARTERY CALCIFICATION SEEN ON CT SCAN: Primary | ICD-10-CM

## 2025-06-03 DIAGNOSIS — R06.09 DOE (DYSPNEA ON EXERTION): ICD-10-CM

## 2025-06-03 RX ORDER — SODIUM CHLORIDE 9 MG/ML
40 INJECTION, SOLUTION INTRAVENOUS AS NEEDED
OUTPATIENT
Start: 2025-06-03

## 2025-06-03 RX ORDER — SODIUM CHLORIDE 0.9 % (FLUSH) 0.9 %
10 SYRINGE (ML) INJECTION EVERY 12 HOURS SCHEDULED
OUTPATIENT
Start: 2025-06-03

## 2025-06-03 RX ORDER — SODIUM CHLORIDE 0.9 % (FLUSH) 0.9 %
10 SYRINGE (ML) INJECTION AS NEEDED
OUTPATIENT
Start: 2025-06-03

## 2025-06-03 NOTE — TELEPHONE ENCOUNTER
Caller: Gualberto Gloria    Relationship: Self    Best call back number: [phone] CAN LEAVE A DETAILED MESSAGE    What is the best time to reach you: ANY    Who are you requesting to speak with (clinical staff, provider,  specific staff member): CLINICAL    What was the call regarding: PATIENT STILL HAVING SYMPTOMS.  HAS BEEN ON 2 DIFFERENT MEDICATIONS AND CAN'T TAKE EITHER.  SHE IS WANTING TO KNOW WHAT THE NEXT STEPS ARE

## 2025-06-03 NOTE — TELEPHONE ENCOUNTER
See if she wants to come in for left heart catheterization on 6/10/25, we can appeal through the insurance since she has been on antianginal medication and still having angina symptoms

## 2025-06-05 ENCOUNTER — TELEPHONE (OUTPATIENT)
Dept: CARDIOLOGY | Facility: CLINIC | Age: 60
End: 2025-06-05
Payer: COMMERCIAL

## 2025-06-05 NOTE — TELEPHONE ENCOUNTER
I spoke to patient and gave an appointment on 06/10/25 for [facility]. Patient was instructed to have a  for the day of the procedure and to arrive at the main entrance registration area in the [facility]. Patient was educated about stent placement and informed that in the event of stent placement, the patient will be required to stay overnight for observation. Patient was instructed to continue all medications as usual. Patient was instructed to have no solid food or milk for 6 hours prior to scheduled arrival time, clear liquids only for 6 hours prior up to 2 hours prior to scheduled arrival time, NPO for 2 hours prior to scheduled arrival time. Patient was instructed to have labs completed on the morning of the procedure. Patient was advised surgery scheduling department will call the afternoon before the procedure to provide an arrival time. Patient is agreeable with no other questions or concerns.

## 2025-06-06 NOTE — PRE-PROCEDURE INSTRUCTIONS
PATIENT INSTRUCTED TO BE:    - PATIENT INSTRUCTED NOTHING TO EAT/ NO SOLID FOOD OR MILK FOR 6 HOURS PRIOR TO SCHEDULED ARRIVAL TIME.     - MAY HAVE CLEAR LIQUIDS ONLY UP TO 2 HOURS PRIOR TO SCHEDULED ARRIVAL TIME, EXCEPT CAN HAVE SIPS OF WATER WITH MEDICATIONS PRIOR TO PROCEDURE    -  INSTRUCTED NO LOTIONS, JEWELRY, PIERCINGS, OR DEODORANT DAY OF THE PROCEDURE    - INSTRUCTED TO TAKE THE FOLLOWING MEDICATIONS THE DAY OF SURGERY:       AS DIRECTED PER CARDIOLOGY    - INSTRUCTED PT TO FOLLOW ANY INSTRUCTIONS GIVEN BY HIS CARDIOLOGIST.    - INFORMED PT THEY ATTEMPT RADIAL APPROACH FIRST IF UNABLE TO PERFORM CATH WITH THAT APPROACH THEY WILL DO A FEMORAL APPROACH.  ALSO, INFORMED PT THEY WILL BE ON BEDREST POSTOP.  IF THE SURGEON FEELS  AN INTERVENTION OR STENTS NEEDS TO BE PLACED, HE/SHE WILL STAY OVER NIGHT FOR OBSERVATION AND MONITORING.     - INFORMED THE PATIENT HE CAN HAVE TWO VISITORS WITH HIM THE DAY OF THE PROCEDURE    - INSTRUCTED PT TO COME TO [facility] ( [address]), CAN [employer] PARK OR SELF PARK. ENTER THE PAVILION THRU MAIN ENTRANCE, TAKE ELEVATORS TO THE FIRST FLOOR, CHECK IN AT THE DESK FOR REGISTRATION, AFTER REGISTRATION PT WILL BE TAKEN THE THE PREOP AREA FOR HIS OR HER PROCEDURE.    -ARRIVAL TIME GIVEN BY SAME DAY SURGERY, YOU WILL RECEIVE A PHONE CALL BETWEEN 1PM AND 4PM, INFORMED PT IF ARRIVAL TIME CHANGES OR ADJUSTMENTS NEED TO BE MADE IN THEIR ARRIVAL TIME, HE/SHE WOULD RECEIVE A CALL.    -INSTRUCTED PT TO COME TO [facility] TO GET THEIR LABS/ EKG DONE PRIOR TO PROCEDURE      - PATIENT VERBALIZED UNDERSTANDING

## 2025-06-09 RX ORDER — ISOSORBIDE MONONITRATE 30 MG/1
30 TABLET, EXTENDED RELEASE ORAL DAILY
Qty: 30 TABLET | Refills: 0 | OUTPATIENT
Start: 2025-06-09

## 2025-06-09 NOTE — H&P
Cardiology Consultation Note  [facility] CATH LAB          Patient Identification:  Gualberto Gloria      [id number]  59 y.o.        female  1965         PCP: Jaspal Gutierrez MD      History of Present Illness:     Patient is a 59-year-old with essential hypertension, dyslipidemia, chronic renal insufficiency T3a and COPD who presented with who has been having some dyspnea on exertion symptoms and underwent a stress test which revealed a small area of anterior possible ischemia.    Past History:  Past Medical History:   Diagnosis Date    COPD (chronic obstructive pulmonary disease)     Hyperlipidemia     Hypertension      Past Surgical History:   Procedure Laterality Date    BACK SURGERY      COLONOSCOPY      TUBAL ABDOMINAL LIGATION       Allergies   Allergen Reactions    Dexamethasone Rash     Face turns red and hot    Pt states she can take  a low dose and will be ok      Social History     Socioeconomic History    Marital status:    Tobacco Use    Smoking status: Former     Current packs/day: 0.00     Average packs/day: 1 pack/day for 37.0 years (37.0 ttl pk-yrs)     Types: Cigarettes     Start date: 1980     Quit date: 2017     Years since quittin.5     Passive exposure: Past    Smokeless tobacco: Never   Vaping Use    Vaping status: Never Used   Substance and Sexual Activity    Alcohol use: Not Currently    Drug use: Never    Sexual activity: Defer     Family History   Problem Relation Age of Onset    Diabetes Mother     Heart disease Mother     Malig Hyperthermia Neg Hx      Medications:  No current facility-administered medications for this encounter.     Physical exam:    There were no vitals taken for this visit. There is no height or weight on file to calculate BMI.   Oxygen saturation   @FLOWAN(10::1)@ No data recorded    General Appearance:   no acute distress  HENT:   lips not cyanotic  Neck:  thyroid not enlarged  supple  Respiratory:  no respiratory  "distress  normal breath sounds  no rales  Cardiovascular:  no jugular venous distention  regular rhythm  apical impulse normal  S1 normal, S2 normal  no S3, no S4   no murmur  no rub, no thrill  no carotid bruit  pedal pulses normal  lower extremity edema: none    Gastrointestinal:   bowel sounds normal  non-tender  no hepatomegaly, no splenomegaly  Musculoskeletal:  no clubbing of fingers.   normocephalic, head atraumatic  Skin:   warm, dry  Neuro/Psychiatric:  judgement and insight appropriate  normal mood and affect    Cardiographics:   Results for orders placed during the hospital encounter of 04/28/25    Adult Transthoracic Echo Complete W/ Cont if Necessary Per Protocol    Interpretation Summary    Left ventricular systolic function is hyperdynamic (EF > 70%).    Left ventricular diastolic function was normal.    The study is technically difficult for diagnosis.      No results found for this or any previous visit.      Cardiolite (Tc-99m Sestamibi) stress test     Lab Review:           Invalid input(s): \"PLATELETCT\"                            CrCl cannot be calculated (Patient's most recent lab result is older than the maximum 30 days allowed.).         Invalid input(s): \"LDLCALC\"          Lab Results   Component Value Date    TSH 3.200 05/07/2025        Lab Results   Component Value Date    HGBA1C 6.40 (H) 05/07/2025      No results found for: \"DIGOXIN\"   No results found for: \"DDIMERQUAN\"       Assessment:      Coronary artery calcification seen on CT scan    ALBERTO (dyspnea on exertion)    Angina pectoris      Initial cardiac assessment: Dyspnea on exertion with exercise evidence of anterior apical ischemia and known coronary calcification seen on CT scan discussed respites alternatives to further workup and agreed upon proceeding with left heart catheterization after going over the risk of bleeding, kidney dysfunction, stroke, heart attack, and death      Recommendations:  1.  C  with possible " PCI        Thank you for allowing us to share in Gualberto lopez.        Geovanni Stanford MD  6/9/2025  09:08 EDT

## 2025-06-10 ENCOUNTER — HOSPITAL ENCOUNTER (OUTPATIENT)
Facility: HOSPITAL | Age: 60
Setting detail: HOSPITAL OUTPATIENT SURGERY
Discharge: HOME | End: 2025-06-10
Attending: INTERNAL MEDICINE | Admitting: INTERNAL MEDICINE
Payer: COMMERCIAL

## 2025-06-10 VITALS
RESPIRATION RATE: 14 BRPM | WEIGHT: 192.46 LBS | OXYGEN SATURATION: 95 % | DIASTOLIC BLOOD PRESSURE: 62 MMHG | HEART RATE: 75 BPM | SYSTOLIC BLOOD PRESSURE: 121 MMHG | TEMPERATURE: 97.5 F | BODY MASS INDEX: 32.86 KG/M2 | HEIGHT: 64 IN

## 2025-06-10 DIAGNOSIS — R06.09 DOE (DYSPNEA ON EXERTION): ICD-10-CM

## 2025-06-10 DIAGNOSIS — R30.9 URINATION PAIN: ICD-10-CM

## 2025-06-10 DIAGNOSIS — I20.9 ANGINA PECTORIS: ICD-10-CM

## 2025-06-10 DIAGNOSIS — I25.10 CORONARY ARTERY CALCIFICATION SEEN ON CT SCAN: ICD-10-CM

## 2025-06-10 LAB
ANION GAP SERPL CALCULATED.3IONS-SCNC: 8.5 MMOL/L (ref 5–15)
BUN SERPL-MCNC: 17.4 MG/DL (ref 8–23)
BUN/CREAT SERPL: 18.9 (ref 7–25)
CALCIUM SPEC-SCNC: 9 MG/DL (ref 8.6–10.5)
CHLORIDE SERPL-SCNC: 104 MMOL/L (ref 98–107)
CO2 SERPL-SCNC: 25.5 MMOL/L (ref 22–29)
CREAT SERPL-MCNC: 0.92 MG/DL (ref 0.57–1)
DEPRECATED RDW RBC AUTO: 46.7 FL (ref 37–54)
EGFRCR SERPLBLD CKD-EPI 2021: 71.4 ML/MIN/1.73
ERYTHROCYTE [DISTWIDTH] IN BLOOD BY AUTOMATED COUNT: 13.3 % (ref 12.3–15.4)
GLUCOSE SERPL-MCNC: 118 MG/DL (ref 65–99)
HCT VFR BLD AUTO: 39.9 % (ref 34–46.6)
HGB BLD-MCNC: 13.1 G/DL (ref 12–15.9)
INR PPP: 0.93 (ref 0.86–1.15)
MCH RBC QN AUTO: 31.3 PG (ref 26.6–33)
MCHC RBC AUTO-ENTMCNC: 32.8 G/DL (ref 31.5–35.7)
MCV RBC AUTO: 95.5 FL (ref 79–97)
PLATELET # BLD AUTO: 242 10*3/MM3 (ref 140–450)
PMV BLD AUTO: 9.1 FL (ref 6–12)
POTASSIUM SERPL-SCNC: 4.2 MMOL/L (ref 3.5–5.2)
PROTHROMBIN TIME: 12.9 SECONDS (ref 11.8–14.9)
RBC # BLD AUTO: 4.18 10*6/MM3 (ref 3.77–5.28)
SODIUM SERPL-SCNC: 138 MMOL/L (ref 136–145)
WBC NRBC COR # BLD AUTO: 5.02 10*3/MM3 (ref 3.4–10.8)

## 2025-06-10 PROCEDURE — 85610 PROTHROMBIN TIME: CPT | Performed by: NURSE PRACTITIONER

## 2025-06-10 PROCEDURE — 99153 MOD SED SAME PHYS/QHP EA: CPT | Performed by: INTERNAL MEDICINE

## 2025-06-10 PROCEDURE — 25010000002 FENTANYL CITRATE (PF) 50 MCG/ML SOLUTION: Performed by: INTERNAL MEDICINE

## 2025-06-10 PROCEDURE — 80048 BASIC METABOLIC PNL TOTAL CA: CPT | Performed by: NURSE PRACTITIONER

## 2025-06-10 PROCEDURE — C1894 INTRO/SHEATH, NON-LASER: HCPCS | Performed by: INTERNAL MEDICINE

## 2025-06-10 PROCEDURE — 93454 CORONARY ARTERY ANGIO S&I: CPT | Performed by: INTERNAL MEDICINE

## 2025-06-10 PROCEDURE — 25010000002 MIDAZOLAM PER 1MG: Performed by: INTERNAL MEDICINE

## 2025-06-10 PROCEDURE — 25010000002 LIDOCAINE 2% SOLUTION: Performed by: INTERNAL MEDICINE

## 2025-06-10 PROCEDURE — 99152 MOD SED SAME PHYS/QHP 5/>YRS: CPT | Performed by: INTERNAL MEDICINE

## 2025-06-10 PROCEDURE — S0260 H&P FOR SURGERY: HCPCS | Performed by: INTERNAL MEDICINE

## 2025-06-10 PROCEDURE — 25010000002 KETOROLAC TROMETHAMINE PER 15 MG: Performed by: INTERNAL MEDICINE

## 2025-06-10 PROCEDURE — 25510000001 IOPAMIDOL PER 1 ML: Performed by: INTERNAL MEDICINE

## 2025-06-10 PROCEDURE — 25810000003 SODIUM CHLORIDE 0.9 % SOLUTION: Performed by: INTERNAL MEDICINE

## 2025-06-10 PROCEDURE — C1769 GUIDE WIRE: HCPCS | Performed by: INTERNAL MEDICINE

## 2025-06-10 PROCEDURE — 25010000002 HEPARIN (PORCINE) PER 1000 UNITS: Performed by: INTERNAL MEDICINE

## 2025-06-10 PROCEDURE — 85027 COMPLETE CBC AUTOMATED: CPT | Performed by: NURSE PRACTITIONER

## 2025-06-10 RX ORDER — SODIUM CHLORIDE 0.9 % (FLUSH) 0.9 %
10 SYRINGE (ML) INJECTION AS NEEDED
Status: DISCONTINUED | OUTPATIENT
Start: 2025-06-10 | End: 2025-06-10 | Stop reason: HOSPADM

## 2025-06-10 RX ORDER — ONDANSETRON 2 MG/ML
4 INJECTION INTRAMUSCULAR; INTRAVENOUS EVERY 6 HOURS PRN
Status: CANCELLED | OUTPATIENT
Start: 2025-06-10

## 2025-06-10 RX ORDER — NITROGLYCERIN 0.4 MG/1
0.4 TABLET SUBLINGUAL
Status: CANCELLED | OUTPATIENT
Start: 2025-06-10

## 2025-06-10 RX ORDER — IOPAMIDOL 755 MG/ML
INJECTION, SOLUTION INTRAVASCULAR
Status: DISCONTINUED | OUTPATIENT
Start: 2025-06-10 | End: 2025-06-10 | Stop reason: HOSPADM

## 2025-06-10 RX ORDER — KETOROLAC TROMETHAMINE 30 MG/ML
30 INJECTION, SOLUTION INTRAMUSCULAR; INTRAVENOUS ONCE
Status: COMPLETED | OUTPATIENT
Start: 2025-06-10 | End: 2025-06-10

## 2025-06-10 RX ORDER — FENTANYL CITRATE 50 UG/ML
INJECTION, SOLUTION INTRAMUSCULAR; INTRAVENOUS
Status: DISCONTINUED | OUTPATIENT
Start: 2025-06-10 | End: 2025-06-10 | Stop reason: HOSPADM

## 2025-06-10 RX ORDER — SODIUM CHLORIDE 9 MG/ML
40 INJECTION, SOLUTION INTRAVENOUS AS NEEDED
Status: DISCONTINUED | OUTPATIENT
Start: 2025-06-10 | End: 2025-06-10 | Stop reason: HOSPADM

## 2025-06-10 RX ORDER — ONDANSETRON 4 MG/1
4 TABLET, ORALLY DISINTEGRATING ORAL EVERY 6 HOURS PRN
Status: CANCELLED | OUTPATIENT
Start: 2025-06-10

## 2025-06-10 RX ORDER — SODIUM CHLORIDE 0.9 % (FLUSH) 0.9 %
10 SYRINGE (ML) INJECTION EVERY 12 HOURS SCHEDULED
Status: DISCONTINUED | OUTPATIENT
Start: 2025-06-10 | End: 2025-06-10 | Stop reason: HOSPADM

## 2025-06-10 RX ORDER — NALOXONE HCL 0.4 MG/ML
0.4 VIAL (ML) INJECTION
Status: CANCELLED | OUTPATIENT
Start: 2025-06-10

## 2025-06-10 RX ORDER — HEPARIN SODIUM 1000 [USP'U]/ML
INJECTION, SOLUTION INTRAVENOUS; SUBCUTANEOUS
Status: DISCONTINUED | OUTPATIENT
Start: 2025-06-10 | End: 2025-06-10 | Stop reason: HOSPADM

## 2025-06-10 RX ORDER — ACETAMINOPHEN 325 MG/1
650 TABLET ORAL EVERY 4 HOURS PRN
Status: CANCELLED | OUTPATIENT
Start: 2025-06-10

## 2025-06-10 RX ORDER — MORPHINE SULFATE 2 MG/ML
1 INJECTION, SOLUTION INTRAMUSCULAR; INTRAVENOUS EVERY 4 HOURS PRN
Status: CANCELLED | OUTPATIENT
Start: 2025-06-10 | End: 2025-06-17

## 2025-06-10 RX ORDER — LIDOCAINE HYDROCHLORIDE 20 MG/ML
INJECTION, SOLUTION INFILTRATION; PERINEURAL
Status: DISCONTINUED | OUTPATIENT
Start: 2025-06-10 | End: 2025-06-10 | Stop reason: HOSPADM

## 2025-06-10 RX ORDER — ACETAMINOPHEN 500 MG
1000 TABLET ORAL ONCE
Status: COMPLETED | OUTPATIENT
Start: 2025-06-10 | End: 2025-06-10

## 2025-06-10 RX ORDER — SODIUM CHLORIDE 9 MG/ML
100 INJECTION, SOLUTION INTRAVENOUS CONTINUOUS
Status: DISCONTINUED | OUTPATIENT
Start: 2025-06-10 | End: 2025-06-10 | Stop reason: HOSPADM

## 2025-06-10 RX ORDER — MIDAZOLAM HYDROCHLORIDE 2 MG/2ML
INJECTION, SOLUTION INTRAMUSCULAR; INTRAVENOUS
Status: DISCONTINUED | OUTPATIENT
Start: 2025-06-10 | End: 2025-06-10 | Stop reason: HOSPADM

## 2025-06-10 RX ADMIN — KETOROLAC TROMETHAMINE 30 MG: 30 INJECTION, SOLUTION INTRAMUSCULAR; INTRAVENOUS at 15:06

## 2025-06-10 RX ADMIN — ACETAMINOPHEN 1000 MG: 500 TABLET ORAL at 15:06

## 2025-06-10 RX ADMIN — SODIUM CHLORIDE 100 ML/HR: 9 INJECTION, SOLUTION INTRAVENOUS at 15:10

## 2025-06-10 NOTE — NURSING NOTE
Spoke with pts granddaughter Dolores and updated her, she knows to be here around 545 for discharge instructions and to pick pt up to go home

## 2025-06-11 ENCOUNTER — TELEPHONE (OUTPATIENT)
Dept: CARDIOLOGY | Facility: CLINIC | Age: 60
End: 2025-06-11

## 2025-06-11 NOTE — TELEPHONE ENCOUNTER
Caller: Gualberto Gloria    Relationship to patient: Self    Best call back number: [phone] OK TO LEAVE A MESSAGE    Chief complaint: HEART CATH YESTERDAY   STILL HAS PAIN IN HEART EVERY ONCE IN A WHILE BUT NOT TODAY    Type of visit: FOLLOW UP    Requested date: IN JULY IF POSSIBLE    If rescheduling, when is the original appointment: 6-11-25     Additional notes:ONLY DR. LUBIN   FIRST AVAILABLE IS IN OCTOBER

## 2025-06-16 ENCOUNTER — TELEPHONE (OUTPATIENT)
Dept: ORTHOPEDIC SURGERY | Facility: CLINIC | Age: 60
End: 2025-06-16
Payer: COMMERCIAL

## 2025-06-16 NOTE — TELEPHONE ENCOUNTER
ATTEMPTED TO CALL PATIENT TO SCHEDULE SX FOR LEFT SHOULDER AS WE HAVE RECEIVED CARDIAC CLEARANCE.     NO ANSWER, LVM FOR PATIENT TO RETURN MY CALL ON MY DIRECT LINE TO SCHEDULE SX.

## 2025-06-17 ENCOUNTER — OFFICE VISIT (OUTPATIENT)
Dept: INTERNAL MEDICINE | Age: 60
End: 2025-06-17
Payer: COMMERCIAL

## 2025-06-17 VITALS
SYSTOLIC BLOOD PRESSURE: 133 MMHG | BODY MASS INDEX: 32.95 KG/M2 | TEMPERATURE: 98.2 F | WEIGHT: 193 LBS | HEIGHT: 64 IN | OXYGEN SATURATION: 95 % | DIASTOLIC BLOOD PRESSURE: 90 MMHG

## 2025-06-17 DIAGNOSIS — F32.89 OTHER DEPRESSION: ICD-10-CM

## 2025-06-17 DIAGNOSIS — Z00.00 PHYSICAL EXAM, ANNUAL: Primary | ICD-10-CM

## 2025-06-17 DIAGNOSIS — E53.8 VITAMIN B12 DEFICIENCY: ICD-10-CM

## 2025-06-17 DIAGNOSIS — Z12.2 SCREENING FOR LUNG CANCER: ICD-10-CM

## 2025-06-17 DIAGNOSIS — R91.8 INDETERMINATE PULMONARY NODULES: ICD-10-CM

## 2025-06-17 DIAGNOSIS — E06.3 HYPOTHYROIDISM DUE TO HASHIMOTO'S THYROIDITIS: ICD-10-CM

## 2025-06-17 DIAGNOSIS — E03.8 OTHER SPECIFIED HYPOTHYROIDISM: ICD-10-CM

## 2025-06-17 DIAGNOSIS — N18.31 STAGE 3A CHRONIC KIDNEY DISEASE: ICD-10-CM

## 2025-06-17 DIAGNOSIS — E78.2 MIXED HYPERLIPIDEMIA: ICD-10-CM

## 2025-06-17 DIAGNOSIS — M19.042 PRIMARY OSTEOARTHRITIS OF BOTH HANDS: ICD-10-CM

## 2025-06-17 DIAGNOSIS — K22.70 BARRETT'S ESOPHAGUS WITHOUT DYSPLASIA: ICD-10-CM

## 2025-06-17 DIAGNOSIS — Z98.890 H/O HAND SURGERY: ICD-10-CM

## 2025-06-17 DIAGNOSIS — M79.641 HAND PAIN, RIGHT: ICD-10-CM

## 2025-06-17 DIAGNOSIS — M19.041 PRIMARY OSTEOARTHRITIS OF BOTH HANDS: ICD-10-CM

## 2025-06-17 DIAGNOSIS — R73.01 IFG (IMPAIRED FASTING GLUCOSE): ICD-10-CM

## 2025-06-17 DIAGNOSIS — I10 ESSENTIAL HYPERTENSION: ICD-10-CM

## 2025-06-17 DIAGNOSIS — I10 PRIMARY HYPERTENSION: ICD-10-CM

## 2025-06-17 RX ORDER — LISINOPRIL AND HYDROCHLOROTHIAZIDE 12.5; 2 MG/1; MG/1
1 TABLET ORAL DAILY
Qty: 90 TABLET | Refills: 3 | Status: SHIPPED | OUTPATIENT
Start: 2025-06-17

## 2025-06-17 RX ORDER — IBUPROFEN 800 MG/1
800 TABLET, FILM COATED ORAL EVERY 6 HOURS PRN
Qty: 360 TABLET | Refills: 3 | Status: SHIPPED | OUTPATIENT
Start: 2025-06-17

## 2025-06-17 RX ORDER — ATORVASTATIN CALCIUM 40 MG/1
40 TABLET, FILM COATED ORAL DAILY
Qty: 90 TABLET | Refills: 3 | Status: SHIPPED | OUTPATIENT
Start: 2025-06-17

## 2025-06-17 RX ORDER — OMEPRAZOLE 40 MG/1
40 CAPSULE, DELAYED RELEASE ORAL DAILY
Qty: 90 CAPSULE | Refills: 3 | Status: SHIPPED | OUTPATIENT
Start: 2025-06-17

## 2025-06-17 RX ORDER — PAROXETINE 40 MG/1
40 TABLET, FILM COATED ORAL EVERY MORNING
Qty: 90 TABLET | Refills: 3 | Status: SHIPPED | OUTPATIENT
Start: 2025-06-17

## 2025-06-17 RX ORDER — LEVOTHYROXINE SODIUM 25 UG/1
25 TABLET ORAL DAILY
Qty: 90 TABLET | Refills: 3 | Status: SHIPPED | OUTPATIENT
Start: 2025-06-17

## 2025-06-17 NOTE — TELEPHONE ENCOUNTER
SPOKE WITH PATIENT, SHE STATES SHE NEEDS TO THINK ABOUT SCHEDULING SX AND WILL CALL BACK TO SCHEDULE WHEN SHE IS READY.

## 2025-06-25 ENCOUNTER — OFFICE VISIT (OUTPATIENT)
Dept: CARDIOLOGY | Facility: CLINIC | Age: 60
End: 2025-06-25
Payer: COMMERCIAL

## 2025-06-25 VITALS
SYSTOLIC BLOOD PRESSURE: 115 MMHG | HEIGHT: 64 IN | DIASTOLIC BLOOD PRESSURE: 80 MMHG | HEART RATE: 87 BPM | WEIGHT: 191 LBS | BODY MASS INDEX: 32.61 KG/M2

## 2025-06-25 DIAGNOSIS — I25.118 CORONARY ARTERY DISEASE OF NATIVE ARTERY OF NATIVE HEART WITH STABLE ANGINA PECTORIS: Primary | ICD-10-CM

## 2025-06-25 DIAGNOSIS — E78.5 HYPERLIPIDEMIA LDL GOAL <55: ICD-10-CM

## 2025-06-25 PROBLEM — I25.10 CORONARY ARTERY CALCIFICATION SEEN ON CT SCAN: Status: RESOLVED | Noted: 2025-03-25 | Resolved: 2025-06-25

## 2025-06-25 PROBLEM — R52 PAIN: Status: RESOLVED | Noted: 2025-02-03 | Resolved: 2025-06-25

## 2025-06-25 PROCEDURE — 99214 OFFICE O/P EST MOD 30 MIN: CPT | Performed by: NURSE PRACTITIONER

## 2025-06-25 RX ORDER — ATORVASTATIN CALCIUM 80 MG/1
80 TABLET, FILM COATED ORAL DAILY
Qty: 90 TABLET | Refills: 3 | Status: SHIPPED | OUTPATIENT
Start: 2025-06-25

## 2025-06-25 NOTE — PROGRESS NOTES
Chief Complaint  heart cath follow up  and cac    Subjective            History of Present Illness  Gualberto Gloria is a 59-year-old female patient who presents to the office today for follow up.  History of Present Illness  The patient presents for a follow-up after undergoing a heart catheterization procedure.    She reports experiencing a distant, non-severe pain in her chest. She also mentions a sensation of feeling a vein, which she does not recall experiencing previously. She is currently on a regimen of aspirin 81 mg, taken every other day, and atorvastatin 40 mg administered daily.        PMH  Past Medical History:   Diagnosis Date    COPD (chronic obstructive pulmonary disease)     Hyperlipidemia     Hypertension          ALLERGY  Allergies   Allergen Reactions    Dexamethasone Rash     Face turns red and hot    Pt states she can take  a low dose and will be ok           SURGICALHX  Past Surgical History:   Procedure Laterality Date    BACK SURGERY      CARDIAC CATHETERIZATION N/A 6/10/2025    Procedure: Left Heart Cath;  Surgeon: Geovanni Stanford MD;  Location: [facility] CATH INVASIVE LOCATION;  Service: Cardiovascular;  Laterality: N/A;    COLONOSCOPY      HAND TENDON SURGERY Right     TUBAL ABDOMINAL LIGATION            SOC  Social History     Socioeconomic History    Marital status:    Tobacco Use    Smoking status: Former     Current packs/day: 0.00     Average packs/day: 1 pack/day for 37.0 years (37.0 ttl pk-yrs)     Types: Cigarettes     Start date: 1980     Quit date: 2017     Years since quittin.5     Passive exposure: Past    Smokeless tobacco: Never   Vaping Use    Vaping status: Never Used   Substance and Sexual Activity    Alcohol use: Not Currently    Drug use: Never    Sexual activity: Defer         FAMHX  Family History   Problem Relation Age of Onset    Diabetes Mother     Heart disease Mother     Malig Hyperthermia Neg Hx           MEDSIGONLY  Current Outpatient  "Medications on File Prior to Visit   Medication Sig    aspirin 81 MG EC tablet Take 1 tablet by mouth Every Other Day.    atorvastatin (LIPITOR) 40 MG tablet Take 1 tablet by mouth Daily.    Cholecalciferol (Vitamin D3) 50 MCG (2000 UT) tablet Take 1 tablet by mouth Every Other Day.    ibuprofen (ADVIL,MOTRIN) 800 MG tablet Take 1 tablet by mouth Every 6 (Six) Hours As Needed for Mild Pain.    levothyroxine (SYNTHROID, LEVOTHROID) 25 MCG tablet Take 1 tablet by mouth Daily.    lisinopril-hydrochlorothiazide (PRINZIDE,ZESTORETIC) 20-12.5 MG per tablet Take 1 tablet by mouth Daily.    omeprazole (priLOSEC) 40 MG capsule Take 1 capsule by mouth Daily.    PARoxetine (PAXIL) 40 MG tablet Take 1 tablet by mouth Every Morning.    Tirzepatide-Weight Management (ZEPBOUND) 2.5 MG/0.5ML solution auto-injector Inject 0.5 mL under the skin into the appropriate area as directed 1 (One) Time Per Week.     No current facility-administered medications on file prior to visit.         Objective   /80   Pulse 87   Ht 162.6 cm (64.02\")   Wt 86.6 kg (191 lb)   BMI 32.76 kg/m²       Physical Exam  HENT:      Head: Normocephalic.   Neck:      Vascular: No carotid bruit.   Cardiovascular:      Rate and Rhythm: Normal rate and regular rhythm.      Pulses: Normal pulses.      Heart sounds: Normal heart sounds. No murmur heard.  Pulmonary:      Effort: Pulmonary effort is normal.      Breath sounds: Normal breath sounds.   Musculoskeletal:      Cervical back: Neck supple.      Right lower leg: No edema.      Left lower leg: No edema.   Skin:     General: Skin is dry.   Neurological:      Mental Status: She is alert and oriented to person, place, and time.   Psychiatric:         Behavior: Behavior normal.         Result Review :   The following data was reviewed by: ANA LAURA Porter on 06/25/2025:  No results found for: \"PROBNP\"  CMP          11/7/2024    09:10 5/7/2025    09:16 6/10/2025    12:37   CMP   Glucose 95  98  118  " "  BUN 18  13  17.4    Creatinine 0.89  0.77  0.92    EGFR 74.3  88.4  71.4    Sodium 137  138  138    Potassium 4.4  4.7  4.2    Chloride 104  101  104    Calcium 9.5  9.4  9.0    Total Protein 6.6  6.6     Albumin 3.8  4.3     Globulin 2.8  2.3     Total Bilirubin 0.3  0.3     Alkaline Phosphatase 111  107     AST (SGOT) 26  26     ALT (SGPT) 21  20     Albumin/Globulin Ratio 1.4  1.9     BUN/Creatinine Ratio 20.2  16.9  18.9    Anion Gap 7.2  12.2  8.5      CBC w/diff          11/7/2024    09:10 5/7/2025    09:16 6/10/2025    12:37   CBC w/Diff   WBC 4.21  5.00  5.02    RBC 4.06  4.09  4.18    Hemoglobin 12.7  12.9  13.1    Hematocrit 37.3  39.4  39.9    MCV 91.9  96.3  95.5    MCH 31.3  31.5  31.3    MCHC 34.0  32.7  32.8    RDW 12.0  13.0  13.3    Platelets 234  262  242    Neutrophil Rel % 48.8  51.6     Immature Granulocyte Rel % 0.2  0.2     Lymphocyte Rel % 36.3  35.6     Monocyte Rel % 10.7  10.2     Eosinophil Rel % 3.3  1.6     Basophil Rel % 0.7  0.8        Lab Results   Component Value Date    TSH 3.200 05/07/2025      Lab Results   Component Value Date    FREET4 1.02 05/07/2025      No results found for: \"DDIMERQUANT\"  No results found for: \"MG\"   No results found for: \"DIGOXIN\"   No results found for: \"TROPONINT\"        Lipid Panel          11/7/2024    09:10 5/7/2025    09:16   Lipid Panel   Total Cholesterol 175  190    Triglycerides 90  111    HDL Cholesterol 57  62    VLDL Cholesterol 17  20    LDL Cholesterol  101  108    LDL/HDL Ratio 1.75  1.71        Results for orders placed during the hospital encounter of 04/28/25    Adult Transthoracic Echo Complete W/ Cont if Necessary Per Protocol    Interpretation Summary    Left ventricular systolic function is hyperdynamic (EF > 70%).    Left ventricular diastolic function was normal.    The study is technically difficult for diagnosis.         Assessment and Plan    Diagnoses and all orders for this visit:    1. Coronary artery disease of native " artery of native heart with stable angina pectoris (Primary)    2. Hyperlipidemia LDL goal <55      Assessment & Plan  1. Coronary artery disease:  - Heart catheterization on 06/10/2025 revealed 10% narrowing in the upper part of the left circumflex artery and 20-30% narrowing further down the vessel.  - No blockage in the left main artery, left anterior descending artery, or right coronary artery.  - Aggressive cholesterol management recommended to prevent further plaque buildup.  - Heart-healthy diet advised.  - Continue aspirin 81 mg every other day.  - Increase atorvastatin dosage from 40 mg to 80 mg daily; take two 40 mg tablets daily until current supply is exhausted, then switch to 80 mg tablets.  - Discussed potential side effects, including joint and muscle aches; report any worsening pain for consideration of alternative therapies.  - Repeat fasting blood work in approximately 3 months to reassess cholesterol levels; lab order placed today.  - Goal LDL cholesterol <55; if levels do not decrease as expected, consider more aggressive treatment.    Follow-up:  - Follow-up appointment with Dr. Stanford scheduled for 10/2025.      Follow Up   Return for Follow up with Dr Stanford as scheduled.    Patient was given instructions and counseling regarding her condition or for health maintenance advice. Please see specific information pulled into the AVS if appropriate.     Gualberto Gloria  reports that she quit smoking about 7 years ago. Her smoking use included cigarettes. She started smoking about 44 years ago. She has a 37 pack-year smoking history. She has been exposed to tobacco smoke. She has never used smokeless tobacco.          Patient or patient representative verbalized consent for the use of Ambient Listening during the visit with  ANA LAURA Porter for chart documentation. 6/30/2025  03:39 EDT    ANA LAURA Porter  06/25/25  11:40 EDT    Dictated Utilizing Dragon Dictation

## 2025-07-09 ENCOUNTER — TELEPHONE (OUTPATIENT)
Dept: INTERNAL MEDICINE | Age: 60
End: 2025-07-09
Payer: COMMERCIAL

## 2025-07-09 NOTE — TELEPHONE ENCOUNTER
Caller: Gualberto Gloria    Relationship: Self    Best call back number: [phone]     What medication are you requesting: WEIGHT LOSS MEDICATION     What are your current symptoms: HELP WITH LOSING WEIGHT     If a prescription is needed, what is your preferred pharmacy and phone number: [facility] [address] [place] [address] [address] [facility] [phone] [facility] [phone]      Additional notes: PATIENT INSURANCE WON'T COVER THE WEIGHT LOSS INJECTIONS. SHE IS LOOKING FOR SOMETHING THAT WILL BE COVERED BY HER INSURANCE. PLEASE CALL AND ADVISE.

## 2025-07-10 RX ORDER — BUPROPION HYDROCHLORIDE 150 MG/1
150 TABLET ORAL DAILY
Qty: 30 TABLET | Refills: 5 | Status: SHIPPED | OUTPATIENT
Start: 2025-07-10

## 2025-07-10 NOTE — TELEPHONE ENCOUNTER
Spoke w/ pt states no wt loss injections covered by insurance.  Please advise on weight loss medication alternative.

## 2025-07-16 ENCOUNTER — HOSPITAL ENCOUNTER (OUTPATIENT)
Dept: CT IMAGING | Facility: HOSPITAL | Age: 60
Discharge: HOME | End: 2025-07-16
Admitting: INTERNAL MEDICINE
Payer: COMMERCIAL

## 2025-07-16 DIAGNOSIS — Z12.2 SCREENING FOR LUNG CANCER: ICD-10-CM

## 2025-07-16 DIAGNOSIS — M19.042 PRIMARY OSTEOARTHRITIS OF BOTH HANDS: ICD-10-CM

## 2025-07-16 DIAGNOSIS — E06.3 HYPOTHYROIDISM DUE TO HASHIMOTO'S THYROIDITIS: ICD-10-CM

## 2025-07-16 DIAGNOSIS — I10 ESSENTIAL HYPERTENSION: ICD-10-CM

## 2025-07-16 DIAGNOSIS — E03.8 OTHER SPECIFIED HYPOTHYROIDISM: ICD-10-CM

## 2025-07-16 DIAGNOSIS — R73.01 IFG (IMPAIRED FASTING GLUCOSE): ICD-10-CM

## 2025-07-16 DIAGNOSIS — M79.641 HAND PAIN, RIGHT: ICD-10-CM

## 2025-07-16 DIAGNOSIS — E78.2 MIXED HYPERLIPIDEMIA: ICD-10-CM

## 2025-07-16 DIAGNOSIS — E53.8 VITAMIN B12 DEFICIENCY: ICD-10-CM

## 2025-07-16 DIAGNOSIS — F32.89 OTHER DEPRESSION: ICD-10-CM

## 2025-07-16 DIAGNOSIS — R91.8 INDETERMINATE PULMONARY NODULES: ICD-10-CM

## 2025-07-16 DIAGNOSIS — N18.31 STAGE 3A CHRONIC KIDNEY DISEASE: ICD-10-CM

## 2025-07-16 DIAGNOSIS — I10 PRIMARY HYPERTENSION: ICD-10-CM

## 2025-07-16 DIAGNOSIS — Z98.890 H/O HAND SURGERY: ICD-10-CM

## 2025-07-16 DIAGNOSIS — K22.70 BARRETT'S ESOPHAGUS WITHOUT DYSPLASIA: ICD-10-CM

## 2025-07-16 DIAGNOSIS — M19.041 PRIMARY OSTEOARTHRITIS OF BOTH HANDS: ICD-10-CM

## 2025-07-16 PROCEDURE — 71271 CT THORAX LUNG CANCER SCR C-: CPT

## 2025-07-17 ENCOUNTER — TELEPHONE (OUTPATIENT)
Dept: ORTHOPEDIC SURGERY | Facility: CLINIC | Age: 60
End: 2025-07-17

## 2025-08-12 ENCOUNTER — OFFICE VISIT (OUTPATIENT)
Dept: ORTHOPEDIC SURGERY | Facility: CLINIC | Age: 60
End: 2025-08-12
Payer: COMMERCIAL

## 2025-08-12 VITALS — WEIGHT: 191 LBS | BODY MASS INDEX: 32.61 KG/M2 | HEIGHT: 64 IN

## 2025-08-12 DIAGNOSIS — M25.819 SHOULDER IMPINGEMENT: ICD-10-CM

## 2025-08-12 DIAGNOSIS — S43.432A TEAR OF LEFT GLENOID LABRUM, INITIAL ENCOUNTER: ICD-10-CM

## 2025-08-12 DIAGNOSIS — M75.122 NONTRAUMATIC COMPLETE TEAR OF LEFT ROTATOR CUFF: Primary | ICD-10-CM

## 2025-08-12 RX ORDER — LIDOCAINE HYDROCHLORIDE 10 MG/ML
5 INJECTION, SOLUTION INFILTRATION; PERINEURAL
Status: COMPLETED | OUTPATIENT
Start: 2025-08-12 | End: 2025-08-12

## 2025-08-12 RX ORDER — TRIAMCINOLONE ACETONIDE 40 MG/ML
40 INJECTION, SUSPENSION INTRA-ARTICULAR; INTRAMUSCULAR
Status: COMPLETED | OUTPATIENT
Start: 2025-08-12 | End: 2025-08-12

## 2025-08-12 RX ADMIN — TRIAMCINOLONE ACETONIDE 40 MG: 40 INJECTION, SUSPENSION INTRA-ARTICULAR; INTRAMUSCULAR at 10:42

## 2025-08-12 RX ADMIN — LIDOCAINE HYDROCHLORIDE 5 ML: 10 INJECTION, SOLUTION INFILTRATION; PERINEURAL at 10:42

## 2025-08-13 ENCOUNTER — TELEPHONE (OUTPATIENT)
Dept: INTERNAL MEDICINE | Age: 60
End: 2025-08-13
Payer: COMMERCIAL

## 2025-08-13 DIAGNOSIS — M19.041 PRIMARY OSTEOARTHRITIS OF BOTH HANDS: ICD-10-CM

## 2025-08-13 DIAGNOSIS — M18.0 ARTHRITIS OF CARPOMETACARPAL (CMC) JOINT OF BOTH THUMBS: Primary | ICD-10-CM

## 2025-08-13 DIAGNOSIS — M19.042 PRIMARY OSTEOARTHRITIS OF BOTH HANDS: ICD-10-CM

## 2025-08-28 ENCOUNTER — OFFICE VISIT (OUTPATIENT)
Dept: ORTHOPEDIC SURGERY | Facility: CLINIC | Age: 60
End: 2025-08-28
Payer: COMMERCIAL

## 2025-08-28 ENCOUNTER — PREP FOR SURGERY (OUTPATIENT)
Dept: OTHER | Facility: HOSPITAL | Age: 60
End: 2025-08-28
Payer: COMMERCIAL

## 2025-08-28 VITALS
WEIGHT: 191 LBS | DIASTOLIC BLOOD PRESSURE: 82 MMHG | HEIGHT: 64 IN | SYSTOLIC BLOOD PRESSURE: 112 MMHG | OXYGEN SATURATION: 94 % | HEART RATE: 79 BPM | BODY MASS INDEX: 32.61 KG/M2

## 2025-08-28 DIAGNOSIS — M75.122 NONTRAUMATIC COMPLETE TEAR OF LEFT ROTATOR CUFF: ICD-10-CM

## 2025-08-28 DIAGNOSIS — M25.819 SHOULDER IMPINGEMENT: Primary | ICD-10-CM

## 2025-08-28 DIAGNOSIS — M25.512 LEFT SHOULDER PAIN, UNSPECIFIED CHRONICITY: ICD-10-CM

## (undated) DEVICE — GLIDESHEATH SLENDER STAINLESS STEEL KIT: Brand: GLIDESHEATH SLENDER

## (undated) DEVICE — CATH F4 INF JR 4 100CM: Brand: INFINITI

## (undated) DEVICE — RADIFOCUS OPTITORQUE ANGIOGRAPHIC CATHETER: Brand: OPTITORQUE

## (undated) DEVICE — GW FC FLOP/TP .035 260CM 3MM

## (undated) DEVICE — CATH F4 INF 3DRC 100CM: Brand: INFINITI